# Patient Record
Sex: FEMALE | Race: WHITE | NOT HISPANIC OR LATINO | Employment: OTHER | URBAN - METROPOLITAN AREA
[De-identification: names, ages, dates, MRNs, and addresses within clinical notes are randomized per-mention and may not be internally consistent; named-entity substitution may affect disease eponyms.]

---

## 2017-01-09 ENCOUNTER — OFFICE VISIT (OUTPATIENT)
Dept: OPHTHALMOLOGY | Facility: CLINIC | Age: 82
End: 2017-01-09
Payer: MEDICARE

## 2017-01-09 DIAGNOSIS — H52.7 REFRACTIVE ERROR: ICD-10-CM

## 2017-01-09 DIAGNOSIS — H25.13 NUCLEAR SCLEROSIS OF BOTH EYES: Primary | ICD-10-CM

## 2017-01-09 DIAGNOSIS — Z46.0 CONTACT LENS/GLASSES FITTING: Primary | ICD-10-CM

## 2017-01-09 DIAGNOSIS — D31.31 CHOROIDAL NEVUS OF RIGHT EYE: ICD-10-CM

## 2017-01-09 PROCEDURE — 92310 CONTACT LENS FITTING OU: CPT | Mod: ,,, | Performed by: OPTOMETRIST

## 2017-01-09 PROCEDURE — 92014 COMPRE OPH EXAM EST PT 1/>: CPT | Mod: S$GLB,,, | Performed by: OPHTHALMOLOGY

## 2017-01-09 PROCEDURE — 92015 DETERMINE REFRACTIVE STATE: CPT | Mod: S$GLB,,, | Performed by: OPHTHALMOLOGY

## 2017-01-09 PROCEDURE — 99499 UNLISTED E&M SERVICE: CPT | Mod: S$GLB,,, | Performed by: OPTOMETRIST

## 2017-01-09 PROCEDURE — 99999 PR PBB SHADOW E&M-EST. PATIENT-LVL I: CPT | Mod: PBBFAC,,, | Performed by: OPHTHALMOLOGY
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
## 2017-01-09 NOTE — MR AVS SNAPSHOT
[facility]  [address] [place] [address]  Phone: [phone]  Fax: [phone]                  Isamar Roe   2017 2:30 PM   Office Visit    Description:  Female : 1935   Provider:  CASEY Giang OD   Department:  [facility]           Reason for Visit     Eye Exam           Diagnoses this Visit        Comments    Contact lens/glasses fitting    -  Primary            To Do List           Future Appointments        Provider Department Dept Phone    2017 8:00 AM LABORATORY, [address] [facility] [phone]    2017 10:40 AM Brian Shin MD [facility] [phone]    2018 10:30 AM Jaxon Rajan MD [facility] [phone]      Goals (5 Years of Data)     None      Follow-Up and Disposition     Return if symptoms worsen or fail to improve.      [facility]     [facility] Care Line -  Assistance  Registered nurses in the [facility] provide clinical advisement, health education, appointment booking, and other advisory services.  Call for this free service at [phone].             Medications           Message regarding Medications     Verify the changes and/or additions to your medication regime listed below are the same as discussed with your clinician today.  If any of these changes or additions are incorrect, please notify your healthcare provider.             Verify that the below list of medications is an accurate representation of the medications you are currently taking.  If none reported, the list may be blank. If incorrect, please contact your healthcare provider. Carry this list with you in case of emergency.           Current Medications     ascorbic acid (VITAMIN C) 500 MG tablet Take 500 mg by mouth once daily.    CALCIUM CITRATE/VITAMIN D2 (CALCIUM CITRATE WITH D ORAL) Take 1 tablet by mouth 2 (two) times daily.    estradiol (ESTRACE) 0.5 MG  tablet TAKE 1 TABLET ONE TIME DAILY    fish oil-fat acid comb.8-hb137 (OMEGA 3-6-9) 1,200 mg Cap Take 1 capsule by mouth once daily.     losartan (COZAAR) 50 MG tablet TAKE 1 TABLET EVERY DAY    niacin, inositol niacinate, (NIACIN FLUSH FREE) 400 mg Cap Take 1 capsule by mouth Every PM.    ondansetron (ZOFRAN) 4 MG tablet Take 1 tablet (4 mg total) by mouth every 8 (eight) hours as needed for Nausea.    verapamil (VERELAN) 120 MG C24P TAKE 1 CAPSULE ONE TIME DAILY    vitamin E 400 UNIT capsule Take 400 Units by mouth once daily.           Clinical Reference Information           Allergies as of 1/9/2017     Ace Inhibitors    Chlorthalidone    Codeine    Demerol (Pf)  [Meperidine (Pf)]    Fosamax  [Alendronate]    Fosamax Plus D  [Alendronate-vitamin D3]    Hydrochlorothiazide (Bulk)    Hydrocodone-acetaminophen    Ibandronate    Ibandronate Sodium    Meperidine    Toprol Xl  [Metoprolol Succinate]      Immunizations Administered on Date of Encounter - 1/9/2017     None
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
## 2017-03-20 ENCOUNTER — OFFICE VISIT (OUTPATIENT)
Dept: INTERNAL MEDICINE | Facility: CLINIC | Age: 82
End: 2017-03-20
Payer: MEDICARE

## 2017-03-20 VITALS
OXYGEN SATURATION: 97 % | DIASTOLIC BLOOD PRESSURE: 79 MMHG | BODY MASS INDEX: 25.87 KG/M2 | HEART RATE: 68 BPM | HEIGHT: 59 IN | WEIGHT: 128.31 LBS | SYSTOLIC BLOOD PRESSURE: 128 MMHG

## 2017-03-20 DIAGNOSIS — D31.31 CHOROIDAL NEVUS OF RIGHT EYE: ICD-10-CM

## 2017-03-20 DIAGNOSIS — M81.0 OSTEOPOROSIS: Chronic | ICD-10-CM

## 2017-03-20 DIAGNOSIS — I70.0 ARTERIOSCLEROSIS OF AORTA: ICD-10-CM

## 2017-03-20 DIAGNOSIS — Z85.828 HISTORY OF SKIN CANCER: ICD-10-CM

## 2017-03-20 DIAGNOSIS — K21.9 HIATAL HERNIA WITH GASTROESOPHAGEAL REFLUX: ICD-10-CM

## 2017-03-20 DIAGNOSIS — K44.9 HIATAL HERNIA WITH GASTROESOPHAGEAL REFLUX: ICD-10-CM

## 2017-03-20 DIAGNOSIS — I10 ESSENTIAL HYPERTENSION: Chronic | ICD-10-CM

## 2017-03-20 DIAGNOSIS — H25.13 NUCLEAR SCLEROSIS OF BOTH EYES: ICD-10-CM

## 2017-03-20 DIAGNOSIS — Z00.00 ENCOUNTER FOR PREVENTIVE HEALTH EXAMINATION: Primary | ICD-10-CM

## 2017-03-20 DIAGNOSIS — E78.5 HYPERLIPIDEMIA, UNSPECIFIED HYPERLIPIDEMIA TYPE: Chronic | ICD-10-CM

## 2017-03-20 DIAGNOSIS — M47.816 LUMBAR ARTHROPATHY: ICD-10-CM

## 2017-03-20 PROCEDURE — G0439 PPPS, SUBSEQ VISIT: HCPCS | Mod: S$GLB,,, | Performed by: PHYSICIAN ASSISTANT

## 2017-03-20 PROCEDURE — 3074F SYST BP LT 130 MM HG: CPT | Mod: S$GLB,,, | Performed by: PHYSICIAN ASSISTANT

## 2017-03-20 PROCEDURE — 3078F DIAST BP <80 MM HG: CPT | Mod: S$GLB,,, | Performed by: PHYSICIAN ASSISTANT

## 2017-03-20 PROCEDURE — 99499 UNLISTED E&M SERVICE: CPT | Mod: S$GLB,,, | Performed by: PHYSICIAN ASSISTANT

## 2017-03-20 PROCEDURE — 99999 PR PBB SHADOW E&M-EST. PATIENT-LVL III: CPT | Mod: PBBFAC,,, | Performed by: PHYSICIAN ASSISTANT
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
## 2017-03-20 NOTE — MR AVS SNAPSHOT
[facility]  [address] [place] [address]  Phone: [phone]  Fax: [phone]                  Isamar Roe   3/20/2017 12:30 PM   Office Visit    Description:  Female : 1935   Provider:  Velasquez Tello PA-C   Department:  [facility] - Internal Medicine           Reason for Visit     Health Risk Assessment           Diagnoses this Visit        Comments    Encounter for preventive health examination    -  Primary     Essential hypertension         Hyperlipidemia, unspecified hyperlipidemia type         Osteoporosis         Arteriosclerosis of aorta         Hiatal hernia with gastroesophageal reflux         Lumbar arthropathy         History of skin cancer         Nuclear sclerosis of both eyes                To Do List           Future Appointments        Provider Department Dept Phone    2017 8:00 AM LABORATORY, [address] [facility] [phone]    2017 10:40 AM Brian Shin MD [facility] Internal [facility] [phone]    2018 10:30 AM Jaxon Rajan MD [facility] Ophthalmology [phone]      Goals (5 Years of Data)     None      [facility]     [facility] Nurse Care [facility] -  Assistance  Registered nurses in the [facility] provide clinical advisement, health education, appointment booking, and other advisory services.  Call for this free service at [phone].             Medications           Message regarding Medications     Verify the changes and/or additions to your medication regime listed below are the same as discussed with your clinician today.  If any of these changes or additions are incorrect, please notify your healthcare provider.        STOP taking these medications     ascorbic acid (VITAMIN C) 500 MG tablet Take 500 mg by mouth once daily.    ondansetron (ZOFRAN) 4 MG tablet Take 1 tablet (4 mg total) by mouth every 8 (eight) hours as needed for Nausea.           Verify that the below list of  "medications is an accurate representation of the medications you are currently taking.  If none reported, the list may be blank. If incorrect, please contact your healthcare provider. Carry this list with you in case of emergency.           Current Medications     CALCIUM CITRATE/VITAMIN D2 (CALCIUM CITRATE WITH D ORAL) Take 1 tablet by mouth 2 (two) times daily.    estradiol (ESTRACE) 0.5 MG tablet TAKE 1 TABLET ONE TIME DAILY    fish oil-fat acid comb.8-hb137 (OMEGA 3-6-9) 1,200 mg Cap Take 1 capsule by mouth once daily.     losartan (COZAAR) 50 MG tablet TAKE 1 TABLET EVERY DAY    niacin, inositol niacinate, (NIACIN FLUSH FREE) 400 mg Cap Take 1 capsule by mouth Every PM.    verapamil (VERELAN) 120 MG C24P TAKE 1 CAPSULE ONE TIME DAILY    vitamin E 400 UNIT capsule Take 400 Units by mouth once daily.           Clinical Reference Information           Your Vitals Were     BP Pulse Height Weight SpO2 BMI    128/79 68 4' 10.5" (1.486 m) 58.2 kg (128 lb 4.9 oz) 97% 26.36 kg/m2      Blood Pressure          Most Recent Value    BP  128/79      Allergies as of 3/20/2017     Ace Inhibitors    Chlorthalidone    Codeine    Demerol (Pf)  [Meperidine (Pf)]    Fosamax  [Alendronate]    Fosamax Plus D  [Alendronate-vitamin D3]    Hydrochlorothiazide (Bulk)    Hydrocodone-acetaminophen    Ibandronate    Ibandronate Sodium    Meperidine    Toprol Xl  [Metoprolol Succinate]      Immunizations Administered on Date of Encounter - 3/20/2017     None      [facility] Sign-Up     Activating your [facility] account is as easy as 1-2-3!     1) Visit [email or web address], select Sign Up Now, enter this activation code and your date of birth, then select Next.  Activation code not generated  Current Patient Portal Status: Account disabled      2) Create a username and password to use when you visit [facility] in the future and select a security question in case you lose your password and select Next.    3) Enter your e-mail address and click Sign " Up!    Additional Information  If you have questions, please e-mail [email or web address] or call [phone] to talk to our [facility] staff. Remember, [facility] is NOT to be used for urgent needs. For medical emergencies, dial 911.         Instructions      Counseling and Referral of Other Preventative  (Italic type indicates deductible and co-insurance are waived)    Patient Name: Isamar Roe  Today's Date: 3/20/2017      SERVICE LIMITATIONS RECOMMENDATION    Vaccines    · Pneumococcal (once after 65)    · Influenza (annually)    · Hepatitis B (if medium/high risk)    · Prevnar 13      Hepatitis B medium/high risk factors:       - End-stage renal disease       - Hemophiliacs who received Factor VII or         IX concentrates       - Clients of institutions for the mentally             retarded       - Persons who live in the same house as          a HepB carrier       - Homosexual men       - Illicit injectable drug abusers     Pneumococcal:done 1/2015     Influenza: Recommended to patient, declined     Hepatitis B: N/A     Prevnar 13: don e8/2015    Mammogram (biennial age 50-74)  Annually (age 40 or over)  done 7/2015. Follow PCP    Pap (up to age 70 and after 70 if unknown history or abnormal study last 10 years)    N/A     The USPSTF recommends against screening for cervical cancer in women older than age 65 years who have had adequate prior screening and are not otherwise at high risk for cervical cancer.      Colorectal cancer screening (to age 75)    · Fecal occult blood test (annual)  · Flexible sigmoidoscopy (5y)  · Screening colonoscopy (10y)  · Barium enema   Last done 5/2012, recommend to repeat every 0  years    Diabetes self-management training (no USPSTF recommendations)  Requires referral by treating physician for patient with diabetes or renal disease. 10 hours of initial DSMT sessions of no less than 30 minutes each in a continuous 12-month period. 2 hours of follow-up DSMT in subsequent years.   Follow with PCP    Bone mass measurements (age 65 & older, biennial)  Requires diagnosis related to osteoporosis or estrogen deficiency. Biennial benefit unless patient has history of long-term glucocorticoid  Last done 9/27/2016, recommend to repeat every 2  years    Glaucoma screening (no USPSTF recommendation)  Diabetes mellitus, family history   , age 50 or over    American, age 65 or over Follow with Padmaja Castillo    Medical nutrition therapy for diabetes or renal disease (no recommended schedule)  Requires referral by treating physician for patient with diabetes or renal disease or kidney transplant within the past 3 years.  Can be provided in same year as diabetes self-management training (DSMT), and CMS recommends medical nutrition therapy take place after DSMT. Up to 3 hours for initial year and 2 hours in subsequent years.  Follow with PCP    Cardiovascular screening blood tests (every 5 years)  · Fasting lipid panel  Order as a panel if possible Follow with PCP    Diabetes screening tests (at least every 3 years, Medicare covers annually or at 6-month intervals for prediabetic patients)  · Fasting blood sugar (FBS) or glucose tolerance test (GTT)  Patient must be diagnosed with one of the following:       - Hypertension       - Dyslipidemia       - Obesity (BMI 30kg/m2)       - Previous elevated impaired FBS or GTT       ... or any two of the following:       - Overweight (BMI 25 but <30)       - Family history of diabetes       - Age 65 or older       - History of gestational diabetes or birth of baby weighing more than 9 pounds  Follow with PCP    Abdominal aortic aneurysm screening (once)  · Sonogram   Limited to patients who meet one of the following criteria:       - Men who are 65-75 years old and have smoked more than 100 cigarette in their lifetime       - Anyone with a family history of abdominal aortic aneurysm       - Anyone recommended for screening by the USPSTF   Ct abd 4/2012. Follow with PCP    HIV screening (annually for increased risk patients)  · HIV-1 and HIV-2 by EIA, or ROSANNE, rapid antibody test or oral mucosa transudate  Patients must be at increased risk for HIV infection per USPSTF guidelines or pregnant. Tests covered annually for patient at increased risk or as requested by the patient. Pregnant patients may receive up to 3 tests during pregnancy.  Risks discussed, screening is not recommended    Smoking cessation counseling (up to 8 sessions per year)  Patients must be asymptomatic of tobacco-related conditions to receive as a preventative service.  does not smoke    Subsequent annual wellness visit  At least 12 months since last AWV  Return in one year     The following information is provided to all patients.  This information is to help you find resources for any of the problems found today that may be affecting your health:                Living healthy guide: www.[facility].[place].[facility]      Understanding Diabetes: www.diabetes.org      Eating healthy: www.cdc.gov/healthyweight      [facility] home safety checklist: www.cdc.gov/steadi/patient.html      Agency on Aging: www.goea.[place].[facility]      Alcoholics anonymous (AA): www.aa.org      Physical Activity: www.juan.nih.gov/gd4niub      Tobacco use: www.quitwithusla.org          Language Assistance Services     ATTENTION: Language assistance services are available, free of charge. Please call [phone].      ATENCIÓN: Si pollyla marileewilson, tiene a benjamin disposición servicios gratuitos de asistencia lingüística. Llame al [phone].     [facility] Ý: N?u b?n nói Ti?ng Vi?t, có các d?ch v? h? tr? ngôn ng? mi?n phí dành cho b?n. G?i s? [phone].         [facility] complies with applicable Federal civil rights laws and does not discriminate on the basis of race, color, national origin, age, disability, or sex.

## 2017-03-20 NOTE — PATIENT INSTRUCTIONS
Counseling and Referral of Other Preventative  (Italic type indicates deductible and co-insurance are waived)    Patient Name: Isamar Roe  Today's Date: 3/20/2017      SERVICE LIMITATIONS RECOMMENDATION    Vaccines    · Pneumococcal (once after 65)    · Influenza (annually)    · Hepatitis B (if medium/high risk)    · Prevnar 13      Hepatitis B medium/high risk factors:       - End-stage renal disease       - Hemophiliacs who received Factor VII or         IX concentrates       - Clients of institutions for the mentally             retarded       - Persons who live in the same house as          a HepB carrier       - Homosexual men       - Illicit injectable drug abusers     Pneumococcal:done 1/2015     Influenza: Recommended to patient, declined     Hepatitis B: N/A     Prevnar 13: don e8/2015    Mammogram (biennial age 50-74)  Annually (age 40 or over)  done 7/2015. Follow PCP    Pap (up to age 70 and after 70 if unknown history or abnormal study last 10 years)    N/A     The USPSTF recommends against screening for cervical cancer in women older than age 65 years who have had adequate prior screening and are not otherwise at high risk for cervical cancer.      Colorectal cancer screening (to age 75)    · Fecal occult blood test (annual)  · Flexible sigmoidoscopy (5y)  · Screening colonoscopy (10y)  · Barium enema   Last done 5/2012, recommend to repeat every 0  years    Diabetes self-management training (no USPSTF recommendations)  Requires referral by treating physician for patient with diabetes or renal disease. 10 hours of initial DSMT sessions of no less than 30 minutes each in a continuous 12-month period. 2 hours of follow-up DSMT in subsequent years.  Follow with PCP    Bone mass measurements (age 65 & older, biennial)  Requires diagnosis related to osteoporosis or estrogen deficiency. Biennial benefit unless patient has history of long-term glucocorticoid  Last done 9/27/2016, recommend to repeat every  2  years    Glaucoma screening (no USPSTF recommendation)  Diabetes mellitus, family history   , age 50 or over    American, age 65 or over Follow with Padmaja Casitllo    Medical nutrition therapy for diabetes or renal disease (no recommended schedule)  Requires referral by treating physician for patient with diabetes or renal disease or kidney transplant within the past 3 years.  Can be provided in same year as diabetes self-management training (DSMT), and CMS recommends medical nutrition therapy take place after DSMT. Up to 3 hours for initial year and 2 hours in subsequent years.  Follow with PCP    Cardiovascular screening blood tests (every 5 years)  · Fasting lipid panel  Order as a panel if possible Follow with PCP    Diabetes screening tests (at least every 3 years, Medicare covers annually or at 6-month intervals for prediabetic patients)  · Fasting blood sugar (FBS) or glucose tolerance test (GTT)  Patient must be diagnosed with one of the following:       - Hypertension       - Dyslipidemia       - Obesity (BMI 30kg/m2)       - Previous elevated impaired FBS or GTT       ... or any two of the following:       - Overweight (BMI 25 but <30)       - Family history of diabetes       - Age 65 or older       - History of gestational diabetes or birth of baby weighing more than 9 pounds  Follow with PCP    Abdominal aortic aneurysm screening (once)  · Sonogram   Limited to patients who meet one of the following criteria:       - Men who are 65-75 years old and have smoked more than 100 cigarette in their lifetime       - Anyone with a family history of abdominal aortic aneurysm       - Anyone recommended for screening by the USPSTF  Ct abd 4/2012. Follow with PCP    HIV screening (annually for increased risk patients)  · HIV-1 and HIV-2 by EIA, or ROSANNE, rapid antibody test or oral mucosa transudate  Patients must be at increased risk for HIV infection per USPSTF guidelines or  pregnant. Tests covered annually for patient at increased risk or as requested by the patient. Pregnant patients may receive up to 3 tests during pregnancy.  Risks discussed, screening is not recommended    Smoking cessation counseling (up to 8 sessions per year)  Patients must be asymptomatic of tobacco-related conditions to receive as a preventative service.  does not smoke    Subsequent annual wellness visit  At least 12 months since last AWV  Return in one year     The following information is provided to all patients.  This information is to help you find resources for any of the problems found today that may be affecting your health:                Living healthy guide: www.Counts include 234 beds at the [email or web address]      Understanding Diabetes: www.diabetes.org      Eating healthy: www.cdc.gov/healthyweight      CDC home safety checklist: www.cdc.gov/steadi/patient.html      Agency on Aging: www.goea.[place].[facility]      Alcoholics anonymous (AA): www.aa.org      Physical Activity: www.juan.nih.gov/ls5tcly      Tobacco use: www.quitwithusla.org

## 2017-06-15 RX ORDER — LOSARTAN POTASSIUM 50 MG/1
TABLET ORAL
Qty: 90 TABLET | Refills: 3 | Status: SHIPPED | OUTPATIENT
Start: 2017-06-15 | End: 2018-08-31 | Stop reason: SDUPTHER

## 2017-07-05 DIAGNOSIS — N95.9 MENOPAUSAL AND POSTMENOPAUSAL DISORDER: ICD-10-CM

## 2017-07-06 RX ORDER — ESTRADIOL 0.5 MG/1
TABLET ORAL
Qty: 90 TABLET | Refills: 3 | Status: SHIPPED | OUTPATIENT
Start: 2017-07-06 | End: 2018-11-15 | Stop reason: SDUPTHER

## 2017-08-23 ENCOUNTER — TELEPHONE (OUTPATIENT)
Dept: INTERNAL MEDICINE | Facility: CLINIC | Age: 82
End: 2017-08-23

## 2017-08-28 ENCOUNTER — LAB VISIT (OUTPATIENT)
Dept: LAB | Facility: HOSPITAL | Age: 82
End: 2017-08-28
Payer: MEDICARE

## 2017-08-28 DIAGNOSIS — I10 ESSENTIAL HYPERTENSION: Chronic | ICD-10-CM

## 2017-08-28 LAB
ANION GAP SERPL CALC-SCNC: 5 MMOL/L
BUN SERPL-MCNC: 15 MG/DL
CALCIUM SERPL-MCNC: 9.3 MG/DL
CHLORIDE SERPL-SCNC: 108 MMOL/L
CHOLEST/HDLC SERPL: 3.6 {RATIO}
CO2 SERPL-SCNC: 29 MMOL/L
CREAT SERPL-MCNC: 0.7 MG/DL
EST. GFR  (AFRICAN AMERICAN): >60 ML/MIN/1.73 M^2
EST. GFR  (NON AFRICAN AMERICAN): >60 ML/MIN/1.73 M^2
GLUCOSE SERPL-MCNC: 79 MG/DL
HDL/CHOLESTEROL RATIO: 27.5 %
HDLC SERPL-MCNC: 233 MG/DL
HDLC SERPL-MCNC: 64 MG/DL
LDLC SERPL CALC-MCNC: 135.8 MG/DL
NONHDLC SERPL-MCNC: 169 MG/DL
POTASSIUM SERPL-SCNC: 4.4 MMOL/L
SODIUM SERPL-SCNC: 142 MMOL/L
TRIGL SERPL-MCNC: 166 MG/DL

## 2017-08-28 PROCEDURE — 80048 BASIC METABOLIC PNL TOTAL CA: CPT

## 2017-08-28 PROCEDURE — 80061 LIPID PANEL: CPT

## 2017-08-28 PROCEDURE — 36415 COLL VENOUS BLD VENIPUNCTURE: CPT

## 2017-09-06 ENCOUNTER — OFFICE VISIT (OUTPATIENT)
Dept: INTERNAL MEDICINE | Facility: CLINIC | Age: 82
End: 2017-09-06
Payer: MEDICARE

## 2017-09-06 VITALS
DIASTOLIC BLOOD PRESSURE: 68 MMHG | HEART RATE: 69 BPM | HEIGHT: 59 IN | SYSTOLIC BLOOD PRESSURE: 138 MMHG | WEIGHT: 131.19 LBS | OXYGEN SATURATION: 98 % | BODY MASS INDEX: 26.45 KG/M2 | TEMPERATURE: 98 F

## 2017-09-06 DIAGNOSIS — E78.5 HYPERLIPIDEMIA, UNSPECIFIED HYPERLIPIDEMIA TYPE: Chronic | ICD-10-CM

## 2017-09-06 DIAGNOSIS — M81.0 OSTEOPOROSIS, UNSPECIFIED OSTEOPOROSIS TYPE, UNSPECIFIED PATHOLOGICAL FRACTURE PRESENCE: Chronic | ICD-10-CM

## 2017-09-06 DIAGNOSIS — Z00.00 ROUTINE HEALTH MAINTENANCE: Primary | ICD-10-CM

## 2017-09-06 DIAGNOSIS — T45.8X5A: ICD-10-CM

## 2017-09-06 DIAGNOSIS — I10 ESSENTIAL HYPERTENSION: Chronic | ICD-10-CM

## 2017-09-06 PROCEDURE — 99499 UNLISTED E&M SERVICE: CPT | Mod: S$GLB,,, | Performed by: FAMILY MEDICINE

## 2017-09-06 PROCEDURE — 99397 PER PM REEVAL EST PAT 65+ YR: CPT | Mod: S$GLB,,, | Performed by: FAMILY MEDICINE

## 2017-09-06 PROCEDURE — 99999 PR PBB SHADOW E&M-EST. PATIENT-LVL III: CPT | Mod: PBBFAC,,, | Performed by: FAMILY MEDICINE

## 2017-09-06 RX ORDER — GUAIFENESIN AND PHENYLEPHRINE HCL 400; 10 MG/1; MG/1
2 TABLET ORAL DAILY
COMMUNITY
End: 2018-09-06

## 2017-09-06 RX ORDER — ASCORBIC ACID 500 MG
500 TABLET ORAL DAILY
COMMUNITY
End: 2018-09-06

## 2017-09-06 RX ORDER — ESTRADIOL 0.5 MG/1
TABLET ORAL
COMMUNITY
Start: 2016-09-06 | End: 2017-09-06 | Stop reason: SDUPTHER

## 2017-09-06 RX ORDER — HYDROCORTISONE 25 MG/G
CREAM TOPICAL
COMMUNITY
Start: 2017-05-11 | End: 2019-05-13

## 2017-09-06 RX ORDER — VERAPAMIL HYDROCHLORIDE 120 MG/1
CAPSULE, EXTENDED RELEASE ORAL
COMMUNITY
Start: 2016-09-12 | End: 2017-09-06 | Stop reason: SDUPTHER

## 2017-09-06 RX ORDER — PANTOPRAZOLE SODIUM 40 MG/1
1 TABLET, DELAYED RELEASE ORAL DAILY
COMMUNITY
Start: 2015-12-10 | End: 2017-09-06

## 2017-09-06 RX ORDER — ONDANSETRON 4 MG/1
4 TABLET, FILM COATED ORAL
COMMUNITY
Start: 2014-12-11 | End: 2019-05-13

## 2017-09-06 RX ORDER — LOSARTAN POTASSIUM 50 MG/1
TABLET ORAL
COMMUNITY
Start: 2016-09-12 | End: 2017-09-06 | Stop reason: SDUPTHER

## 2017-09-06 RX ORDER — LEVOCARNITINE 500 MG
1 TABLET ORAL DAILY
COMMUNITY
Start: 2012-05-04 | End: 2019-05-13

## 2017-09-06 RX ORDER — VITAMIN E 268 MG
400 CAPSULE ORAL
COMMUNITY
End: 2017-09-06 | Stop reason: SDUPTHER

## 2017-09-06 RX ORDER — FLUTICASONE PROPIONATE 50 MCG
2 SPRAY, SUSPENSION (ML) NASAL DAILY
Qty: 16 G | Refills: 1 | Status: SHIPPED | OUTPATIENT
Start: 2017-09-06 | End: 2018-03-26

## 2017-09-06 NOTE — PATIENT INSTRUCTIONS
Low-Cholesterol Diet  Your body needs cholesterol to build new cells and create certain hormones. There are 2 kinds of cholesterol in your blood:    · HDL (good) cholesterol. This prevents fat deposits (plaque) from building up in your arteries. In this way it protects against heart disease and stroke.  · LDL (bad) cholesterol. This stays in your body and sticks to artery walls. Over time it may block blood flow to the heart and brain. This can cause a heart attack or stroke.  The cholesterol in your blood comes from 2 sources: cholesterol in food that you eat and cholesterol that your liver makes. You should limit the amount of cholesterol in your diet. But the cholesterol that your body makes has the greatest disease risk. And your body makes more cholesterol when your diet is high in bad fats (saturated and trans fats). There are 2 kinds of fats you can eat:  · Good fats, or unsaturated fats (mono-unsaturated and poly-unsaturated). They raise the level of good cholesterol and lower the level of bad cholesterol. Good fats are found in vegetable oils such as olive, sunflower, corn, and soybean oils, and in nuts and seeds.  · Bad fats, or saturated fats (including foods high in cholesterol) and trans fats. These raise your risk of disease. They lower the good cholesterol and raise the level of bad cholesterol. Bad fats are found in animal products, including meat, whole-milk dairy products, and butter. Some plants are also high in bad fats (coconut and palm plants). Trans fats are found in hard (stick) margarines. They are also in many fast foods and commercially baked goods. Soft margarine sold in tubs has fewer trans fats and is safer to use.  High blood cholesterol is usually due to a diet high in saturated fat, along with not being physically active. In some cases, genetics plays a role in causing high cholesterol. The tips below will help you create healthy eating habits that will help lower your blood  cholesterol level.  Create a diet high in good fats, low in bad fats (and low in cholesterol)  The following steps will help you create a diet high in good fats and low in bad fats:  · Talk with your doctor before starting a low cholesterol diet or weight loss program.  · Learn to read nutrition labels and select appropriate portion sizes.  · When cooking, use plant-based unsaturated vegetable oils (sunflower, corn, soybean, canola, peanut, and olive oils).  · Avoid saturated fats found in animal products such as meat, dairy (whole-milk, cheese and ice cream), poultry skin, and egg yolks. Plants high in saturated oils include coconut oil, palm oil, and palm kernel oil.  · If you eat meat, choose smaller portions and lean cuts, such as round, rosette, sirloin, or loin. Eat more meatless meals.  · Replace meat with fish at least 2 times a week. Fish is an important source of the unsaturated fat called omega-3 fatty acids. This fat has potential to lower the risk of heart disease.  · Replace whole-milk dairy products with low-fat or nonfat products. Try soy products. Soy helps to reduce total cholesterol.  · Supplement your diet with protective fibers. Eat nuts, seeds, and whole grains rather than white rice and bread. These foods lower both cholesterol and triglyceride levels. (Triglycerides are another fat found in the blood.) Walnuts are one of the best sources of omega-3 fatty acids.  · Eat plenty of fresh fruits and vegetables daily.  · Avoid fast foods and commercial baked goods. Assume they contain saturated fat unless labeled otherwise.  Date Last Reviewed: 8/1/2016  © 1529-1832 [employer]. [address] [place] [address], [place] [address]. All rights reserved. This information is not intended as a substitute for professional medical care. Always follow your healthcare professional's instructions.

## 2017-09-06 NOTE — PROGRESS NOTES
Subjective:       Patient ID: Isamar Roe is a 82 y.o. female.    Chief Complaint: here for physical examination and issues  below    HPI Hypertension: blood pressures at home normal. Tolerating medicine.   hyperchol on niacin d/wd lowering chol\ consider asa;recent poor diet. Intol statins  osteopor intol bisphos  Past Medical History:   Diagnosis Date    Arthritis     hands    Benign hematuria     [place]    Calcific tendinitis of left shoulder 1/27/2016    Cataract     Diverticulosis     colonoscopy 5/22/2012    Duodenal diverticulum     EGD 5/22/2012    GERD (gastroesophageal reflux disease)     Hemorrhoids     colonoscopy 5/22/2012    History of skin cancer     per patient s/p excision x2 face, no chemo or radiation, sees outside derm, Dr. Tinsley.    Hyperlipidemia     Hypertension     Impingement syndrome of left shoulder 1/27/2016    Osteoporosis     declines tx;11/17/16 phone note    Pneumonia     as a child    Sciatica     [place]; dr castillo    Skin cancer     Dr. Noriega    Vitamin D deficiency disease      Past Surgical History:   Procedure Laterality Date    APPENDECTOMY      CARPAL TUNNEL RELEASE Bilateral     CHOLECYSTECTOMY      HYSTERECTOMY      joint removal in right thumb      SKIN CANCER EXCISION      x2    SPINE SURGERY  9/16/14    L4/5 laminectomy;dr sagastume    TOE SURGERY      [facility]     Family History   Problem Relation Age of Onset    Heart disease Mother     Hypertension Mother     Cataracts Mother     Heart disease Father     Hypertension Father     Cataracts Father     Heart disease Brother 45    Heart disease Sister      A-fib    Heart disease Sister      heart bypass x 5    Kidney disease Brother 70    Heart disease Brother     Cancer Maternal Aunt      breast    Diabetes Maternal Grandmother     Diabetes Maternal Aunt     Hypertension Other      multiple family members    Stroke Neg Hx     Melanoma Neg Hx      Social History     Social History     Marital status:      Spouse name: AMBREEN    Number of children: 4    Years of education: N/A     Social History Main Topics    Smoking status: Never Smoker    Smokeless tobacco: Never Used    Alcohol use No    Drug use: No    Sexual activity: Yes     Partners: Male     Other Topics Concern    Are You Pregnant Or Think You May Be? No    Breast-Feeding No     Social History Narrative    None       Review of Systems  Cardiovascular: no chest pain  Chest: no shortness of breath  Abd: no abd pain  Remainder review of systems negative    Objective:      Physical Exam   Constitutional: She is oriented to person, place, and time. She appears well-developed and well-nourished. No distress.   HENT:   Head: Atraumatic.   Right Ear: External ear normal.   Left Ear: External ear normal.   Nose: Nose normal.   Mouth/Throat: Oropharynx is clear and moist. No oropharyngeal exudate.   bilat tms nl   Eyes: Conjunctivae and EOM are normal. Pupils are equal, round, and reactive to light. No scleral icterus.   Neck: Normal range of motion. Neck supple. No thyromegaly present.   Cardiovascular: Normal rate, regular rhythm and normal heart sounds.    No murmur heard.  Pulmonary/Chest: Effort normal and breath sounds normal. No respiratory distress. She has no wheezes. She has no rales.   Abdominal: Soft. Bowel sounds are normal. She exhibits no distension and no mass. There is no hepatosplenomegaly. There is no tenderness. There is no rebound and no guarding.   Musculoskeletal: Normal range of motion. She exhibits no edema or tenderness.   Lymphadenopathy:     She has no cervical adenopathy.   Neurological: She is alert and oriented to person, place, and time. No cranial nerve deficit. She exhibits normal muscle tone. Coordination normal.   Skin: Skin is warm. No rash noted. No erythema. No pallor.   Psychiatric: She has a normal mood and affect. Her behavior is normal. Judgment and thought content normal.   Nursing note  and vitals reviewed.      Assessment:       1. Routine health maintenance    2. Essential hypertension    3. Osteoporosis, unspecified osteoporosis type, unspecified pathological fracture presence    4. Hyperlipidemia, unspecified hyperlipidemia type        Plan:       **Aspirin 81 mg for cardiovascular and stroke prevention discussed side effects discussed  lowchol diet handout  Lab 12 months and follow up after  Routine health maintenance    Essential hypertension  -     Comprehensive metabolic panel; Future; Expected date: 09/06/2018    Osteoporosis, unspecified osteoporosis type, unspecified pathological fracture presence  -     Ambulatory referral to Endocrinology    Hyperlipidemia, unspecified hyperlipidemia type  -     Lipid panel; Future; Expected date: 09/06/2018    Adverse effect of biphosphonate  -     Ambulatory referral to Endocrinology    Other orders  -     fluticasone (FLONASE) 50 mcg/actuation nasal spray; 2 sprays by Each Nare route once daily.  Dispense: 16 g; Refill: 1      *

## 2017-09-07 RX ORDER — VERAPAMIL HYDROCHLORIDE 120 MG/1
CAPSULE, EXTENDED RELEASE ORAL
Qty: 90 CAPSULE | Refills: 3 | Status: SHIPPED | OUTPATIENT
Start: 2017-09-07 | End: 2018-10-23 | Stop reason: SDUPTHER

## 2017-10-18 ENCOUNTER — OFFICE VISIT (OUTPATIENT)
Dept: ENDOCRINOLOGY | Facility: CLINIC | Age: 82
End: 2017-10-18
Payer: MEDICARE

## 2017-10-18 ENCOUNTER — LAB VISIT (OUTPATIENT)
Dept: LAB | Facility: HOSPITAL | Age: 82
End: 2017-10-18
Attending: INTERNAL MEDICINE
Payer: MEDICARE

## 2017-10-18 VITALS
WEIGHT: 132.69 LBS | BODY MASS INDEX: 26.75 KG/M2 | TEMPERATURE: 97 F | DIASTOLIC BLOOD PRESSURE: 76 MMHG | HEART RATE: 67 BPM | SYSTOLIC BLOOD PRESSURE: 130 MMHG | HEIGHT: 59 IN

## 2017-10-18 DIAGNOSIS — M81.0 OSTEOPOROSIS, UNSPECIFIED OSTEOPOROSIS TYPE, UNSPECIFIED PATHOLOGICAL FRACTURE PRESENCE: Primary | Chronic | ICD-10-CM

## 2017-10-18 DIAGNOSIS — M81.0 OSTEOPOROSIS, UNSPECIFIED OSTEOPOROSIS TYPE, UNSPECIFIED PATHOLOGICAL FRACTURE PRESENCE: Chronic | ICD-10-CM

## 2017-10-18 LAB
25(OH)D3+25(OH)D2 SERPL-MCNC: 37 NG/ML
ALBUMIN SERPL BCP-MCNC: 3.4 G/DL
ALP SERPL-CCNC: 86 U/L
CALCIUM SERPL-MCNC: 9.7 MG/DL
PHOSPHATE SERPL-MCNC: 3 MG/DL
PTH-INTACT SERPL-MCNC: 42 PG/ML

## 2017-10-18 PROCEDURE — 82040 ASSAY OF SERUM ALBUMIN: CPT

## 2017-10-18 PROCEDURE — 99204 OFFICE O/P NEW MOD 45 MIN: CPT | Mod: S$GLB,,, | Performed by: INTERNAL MEDICINE

## 2017-10-18 PROCEDURE — 82306 VITAMIN D 25 HYDROXY: CPT

## 2017-10-18 PROCEDURE — 36415 COLL VENOUS BLD VENIPUNCTURE: CPT | Mod: PO

## 2017-10-18 PROCEDURE — 82310 ASSAY OF CALCIUM: CPT

## 2017-10-18 PROCEDURE — 84100 ASSAY OF PHOSPHORUS: CPT

## 2017-10-18 PROCEDURE — 99499 UNLISTED E&M SERVICE: CPT | Mod: S$GLB,,, | Performed by: INTERNAL MEDICINE

## 2017-10-18 PROCEDURE — 84075 ASSAY ALKALINE PHOSPHATASE: CPT

## 2017-10-18 PROCEDURE — 99999 PR PBB SHADOW E&M-EST. PATIENT-LVL III: CPT | Mod: PBBFAC,,, | Performed by: INTERNAL MEDICINE

## 2017-10-18 PROCEDURE — 83970 ASSAY OF PARATHORMONE: CPT

## 2017-10-18 NOTE — LETTER
October 18, 2017      Brian Shin MD  [address]  [place] [address]           [facility] - Endocrinology  [address]  [address]  [place] [address]  Phone: [phone]  Fax: [phone]          Patient: Isamar Roe   MR Number: [id number]   YOB: 1935   Date of Visit: 10/18/2017       Dear Dr. Brian Shin:    Thank you for referring Isamar Roe to me for evaluation. Attached you will find relevant portions of my assessment and plan of care.    If you have questions, please do not hesitate to call me. I look forward to following Isamar Roe along with you.    Sincerely,    Leticia Maya MD    Enclosure  CC:  No Recipients    If you would like to receive this communication electronically, please contact [email or web address] or [phone] to request more information on [employer] access.    For providers and/or their staff who would like to refer a patient to [facility], please contact us through our one-stop-shop provider referral line, [facility], at [phone].    If you feel you have received this communication in error or would no longer like to receive these types of communications, please e-mail [email or web address]

## 2017-10-18 NOTE — PROGRESS NOTES
"Subjective:       Isamar Roe is a 82 y.o. female who I am asked to see in consultation for evaluation of osteoporosis. She was diagnosed with osteoporosis by a bone density scan in 2014- may have had osteopenia prior- last BMD 10/2016 showed worseing in L-spine(t-2.6). Patient denies history of fracture. The cause of osteoporosis is felt to be due to postmenopausal estrogen deficiency. She is currently being treated with calcium and vitamin D supplementation. She is not currently being treated with bisphosphonates.  When she tried Fosamax and Boniva she had aching all over and joint pain      Osteoporosis Risk Factors   Nonmodifiable  Personal Hx of fracture as an adult: no  Hx of fracture in first-degree relative: no   race: yes  Advanced age: yes  Female sex: yes  Dementia: no  Poor health/frailty: no     Potentially modifiable:  Tobacco use: no  Low body weight (<127 lbs): no  Estrogen deficiency     early menopause (age <45) or bilateral ovariectomy: no     prolonged premenopausal amenorrhea (>1 yr): no  Low calcium intake (lifelong): no  Alcoholism: no  Recurrent falls: no  Inadequate physical activity: no    Has GERD gaviscon prn    Current calcium and Vit D intake: 2  calcium tabs 600mg tabs with D supper   Dietary sources: 2 to 3 cups of milk  Supplements: n/a    The following portions of the patient's history were reviewed and updated as appropriate: allergies, current medications, past family history, past medical history, past social history, past surgical history and problem list.    Review of Systems  A comprehensive review of systems was negative except for: Constitutional: positive for tiredness occ       Objective:      /76   Pulse 67   Temp 96.6 °F (35.9 °C) (Tympanic)   Ht 4' 10.5" (1.486 m)   Wt 60.2 kg (132 lb 11.5 oz)   BMI 27.27 kg/m²   General:  alert, appears stated age and cooperative   Body Habitus thin   Oropharynx: lips, mucosa, and tongue normal; teeth and gums " normal    Eyes:  negative findings: lids and lashes normal, conjunctivae and sclerae normal, corneas clear and pupils equal, round, reactive to light and accomodation    Ears:  normal TM's and external ear canals both ears   Neck: no adenopathy, no carotid bruit, no JVD, supple, symmetrical, trachea midline and thyroid not enlarged, symmetric, no tenderness/mass/nodules   Thyroid:  no palpable nodule   Lung: clear to auscultation bilaterally   Heart:  regular rate and rhythm, S1, S2 normal, no murmur, click, rub or gallop   Abdomen: soft, non-tender; bowel sounds normal; no masses,  no organomegaly   Extremities: extremities normal, atraumatic, no cyanosis or edema   Skin: warm and dry, no hyperpigmentation, vitiligo, or suspicious lesions   Pulses: 2+ and symmetric   Neuro: normal without focal findings, mental status, speech normal, alert and oriented x3, NAY, reflexes normal and symmetric, sensation grossly normal and gait and station normal     Imaging  Bone Density: Spine T Score: -2.6, Hip T Score: -1.3      Assessment:      Osteoporosis, with worsening in L-spine on current therapy of only calcium and vitamin D .    GERD  Plan:          1.  When she was on Bisphosphonate therapy she had significant body aches, also has history of GERD therefore will plan to treat would Prolia  Contraindications reviewed: no  Will obtain blood work in 24 hour urine to help evaluate for other causes of osteoporosis as well as assess calcium and vitamin D status  2. Calcium and vitamin D supplementation.  Patient advised that if she is taking calcium carbonate that it is best absorbed with a meal and then  doses as opposed to taking the 2 tablets together and while on Prolia it will  be important to prevent hypocalcemia to continue to be compliant with taking calcium daily     3. The risks and benefits of my recommendations, as well as other treatment options were discussed with the patient and  today.  Questions were answered.  4. Follow up: 6 months and as needed.  5. Approximately 30 minutes of this 40 minute appointment was spent counseling,  explaining osteoporosis and its etiology/treatment, and answering questions.

## 2017-10-18 NOTE — PATIENT INSTRUCTIONS
Cacium carbonate take with food and separate tablets to twice a day    (calcium citrate absorbs better-ok to take together)

## 2017-10-30 ENCOUNTER — LAB VISIT (OUTPATIENT)
Dept: LAB | Facility: HOSPITAL | Age: 82
End: 2017-10-30
Attending: INTERNAL MEDICINE
Payer: MEDICARE

## 2017-10-30 DIAGNOSIS — M81.0 OSTEOPOROSIS, UNSPECIFIED OSTEOPOROSIS TYPE, UNSPECIFIED PATHOLOGICAL FRACTURE PRESENCE: Chronic | ICD-10-CM

## 2017-10-30 LAB
CALCIUM 24H UR-MRATE: 9 MG/HR
CALCIUM UR-MCNC: 15.7 MG/DL
CALCIUM URINE (MG/SPEC): 212 MG/SPEC
CREAT 24H UR-MRATE: 25.3 MG/HR
CREAT UR-MCNC: 45 MG/DL
CREATININE, URINE (MG/SPEC): 607.5 MG/SPEC
SODIUM 24H UR-SRATE: 4.4 MMOL/HR
SODIUM UR-SCNC: 78 MMOL/L
SODIUM URINE (MMOL/SPEC): 105 MMOL/SPEC
URINE COLLECTION DURATION: 24 HR
URINE VOLUME: 1350 ML

## 2017-10-30 PROCEDURE — 84300 ASSAY OF URINE SODIUM: CPT

## 2017-10-30 PROCEDURE — 82340 ASSAY OF CALCIUM IN URINE: CPT

## 2017-10-30 PROCEDURE — 82570 ASSAY OF URINE CREATININE: CPT

## 2017-11-06 DIAGNOSIS — M81.0 OSTEOPOROSIS, UNSPECIFIED OSTEOPOROSIS TYPE, UNSPECIFIED PATHOLOGICAL FRACTURE PRESENCE: Primary | ICD-10-CM

## 2017-11-13 ENCOUNTER — OFFICE VISIT (OUTPATIENT)
Dept: INTERNAL MEDICINE | Facility: CLINIC | Age: 82
End: 2017-11-13
Payer: MEDICARE

## 2017-11-13 VITALS
TEMPERATURE: 98 F | DIASTOLIC BLOOD PRESSURE: 62 MMHG | BODY MASS INDEX: 27.81 KG/M2 | WEIGHT: 132.5 LBS | HEART RATE: 81 BPM | HEIGHT: 58 IN | SYSTOLIC BLOOD PRESSURE: 136 MMHG

## 2017-11-13 DIAGNOSIS — R05.9 COUGH: ICD-10-CM

## 2017-11-13 DIAGNOSIS — J32.0 MAXILLARY SINUSITIS, UNSPECIFIED CHRONICITY: Primary | ICD-10-CM

## 2017-11-13 PROCEDURE — 99999 PR PBB SHADOW E&M-EST. PATIENT-LVL III: CPT | Mod: PBBFAC,,, | Performed by: FAMILY MEDICINE

## 2017-11-13 PROCEDURE — 99214 OFFICE O/P EST MOD 30 MIN: CPT | Mod: S$GLB,,, | Performed by: FAMILY MEDICINE

## 2017-11-13 RX ORDER — METHYLPREDNISOLONE 4 MG/1
TABLET ORAL
Qty: 1 PACKAGE | Refills: 0 | Status: SHIPPED | OUTPATIENT
Start: 2017-11-13 | End: 2017-12-04

## 2017-11-13 RX ORDER — PROMETHAZINE HYDROCHLORIDE AND DEXTROMETHORPHAN HYDROBROMIDE 6.25; 15 MG/5ML; MG/5ML
5 SYRUP ORAL NIGHTLY PRN
Qty: 50 ML | Refills: 0 | Status: SHIPPED | OUTPATIENT
Start: 2017-11-13 | End: 2017-11-23

## 2017-11-13 NOTE — PROGRESS NOTES
Subjective:       Patient ID: Isamar Roe is a 82 y.o. female.    Chief Complaint: Sore Throat and Cough    F/U:      Pt is reporting a cough x 3 day worse when lay down. Pt denied any sinus pressure or post nasal drip. No fever at this point.       Review of Systems   Constitutional: Negative.    HENT: Negative for sinus pain, sinus pressure, sneezing and sore throat.    Respiratory: Positive for cough.    Cardiovascular: Negative.    Gastrointestinal: Negative.    Genitourinary: Negative.    Neurological: Negative.    Hematological: Negative.    Psychiatric/Behavioral: Negative.        Objective:      Physical Exam   Constitutional: She is oriented to person, place, and time. She appears well-developed and well-nourished.   HENT:   Right Ear: Tympanic membrane is erythematous. A middle ear effusion is present.   Left Ear: Tympanic membrane is erythematous.   Nose: Mucosal edema and rhinorrhea present. Right sinus exhibits maxillary sinus tenderness. Left sinus exhibits maxillary sinus tenderness.   Mouth/Throat: Posterior oropharyngeal erythema present.   Neck: Normal range of motion. Neck supple.   Cardiovascular: Normal rate and regular rhythm.    Pulmonary/Chest: Effort normal and breath sounds normal.   Abdominal: Soft. Bowel sounds are normal.   Neurological: She is alert and oriented to person, place, and time.   Skin: Skin is warm and dry.   Psychiatric: She has a normal mood and affect. Her behavior is normal.       Assessment:       1. Maxillary sinusitis, unspecified chronicity    2. Cough        Plan:       Maxillary sinusitis, unspecified chronicity  Comments:  Will start on medrol dose pack. OTC flonase and antihistamine    Cough  Comments:  Will start on phenergan dm and mucinex dm during the daytime    Other orders  -     methylPREDNISolone (MEDROL DOSEPACK) 4 mg tablet; use as directed  Dispense: 1 Package; Refill: 0  -     promethazine-dextromethorphan (PROMETHAZINE-DM) 6.25-15 mg/5 mL Syrp;  Take 5 mLs by mouth nightly as needed.  Dispense: 50 mL; Refill: 0

## 2017-11-13 NOTE — PATIENT INSTRUCTIONS
Zrytec or claritin: 1 tablet in morning    Mucinex DM - 1 tab in morning    Medrol dose- steroid pack.    Phenergan DM- 1 tsp at night.

## 2017-11-24 DIAGNOSIS — K21.9 HIATAL HERNIA WITH GASTROESOPHAGEAL REFLUX: ICD-10-CM

## 2017-11-24 DIAGNOSIS — M81.0 OSTEOPOROSIS, UNSPECIFIED OSTEOPOROSIS TYPE, UNSPECIFIED PATHOLOGICAL FRACTURE PRESENCE: Primary | Chronic | ICD-10-CM

## 2017-11-24 DIAGNOSIS — K44.9 HIATAL HERNIA WITH GASTROESOPHAGEAL REFLUX: ICD-10-CM

## 2017-11-27 ENCOUNTER — TELEPHONE (OUTPATIENT)
Dept: ENDOCRINOLOGY | Facility: CLINIC | Age: 82
End: 2017-11-27

## 2017-12-19 ENCOUNTER — TELEPHONE (OUTPATIENT)
Dept: ENDOCRINOLOGY | Facility: CLINIC | Age: 82
End: 2017-12-19

## 2018-02-07 ENCOUNTER — OFFICE VISIT (OUTPATIENT)
Dept: INTERNAL MEDICINE | Facility: CLINIC | Age: 83
End: 2018-02-07
Payer: MEDICARE

## 2018-02-07 VITALS
BODY MASS INDEX: 27.09 KG/M2 | HEART RATE: 73 BPM | HEIGHT: 58 IN | TEMPERATURE: 98 F | DIASTOLIC BLOOD PRESSURE: 68 MMHG | WEIGHT: 129.06 LBS | SYSTOLIC BLOOD PRESSURE: 160 MMHG | OXYGEN SATURATION: 98 %

## 2018-02-07 DIAGNOSIS — J01.90 ACUTE SINUSITIS, RECURRENCE NOT SPECIFIED, UNSPECIFIED LOCATION: ICD-10-CM

## 2018-02-07 DIAGNOSIS — J20.9 ACUTE BRONCHITIS, UNSPECIFIED ORGANISM: Primary | ICD-10-CM

## 2018-02-07 PROCEDURE — 1159F MED LIST DOCD IN RCRD: CPT | Mod: S$GLB,,, | Performed by: NURSE PRACTITIONER

## 2018-02-07 PROCEDURE — 99999 PR PBB SHADOW E&M-EST. PATIENT-LVL IV: CPT | Mod: PBBFAC,,, | Performed by: NURSE PRACTITIONER

## 2018-02-07 PROCEDURE — 1125F AMNT PAIN NOTED PAIN PRSNT: CPT | Mod: S$GLB,,, | Performed by: NURSE PRACTITIONER

## 2018-02-07 PROCEDURE — 3008F BODY MASS INDEX DOCD: CPT | Mod: S$GLB,,, | Performed by: NURSE PRACTITIONER

## 2018-02-07 PROCEDURE — 99214 OFFICE O/P EST MOD 30 MIN: CPT | Mod: S$GLB,,, | Performed by: NURSE PRACTITIONER

## 2018-02-07 RX ORDER — BENZONATATE 100 MG/1
100 CAPSULE ORAL 3 TIMES DAILY PRN
Qty: 30 CAPSULE | Refills: 0 | Status: SHIPPED | OUTPATIENT
Start: 2018-02-07 | End: 2018-02-17

## 2018-02-07 RX ORDER — AZITHROMYCIN 500 MG/1
500 TABLET, FILM COATED ORAL DAILY
Qty: 5 TABLET | Refills: 0 | Status: SHIPPED | OUTPATIENT
Start: 2018-02-07 | End: 2018-03-26 | Stop reason: ALTCHOICE

## 2018-02-07 NOTE — PATIENT INSTRUCTIONS
Bronchitis, Antibiotic Treatment (Adult)    Bronchitis is an infection of the air passages (bronchial tubes) in your lungs. It often occurs when you have a cold. This illness is contagious during the first few days and is spread through the air by coughing and sneezing, or by direct contact (touching the sick person and then touching your own eyes, nose, or mouth).  Symptoms of bronchitis include cough with mucus (phlegm) and low-grade fever. Bronchitis usually lasts 7 to 14 days. Mild cases can be treated with simple home remedies. More severe infection is treated with an antibiotic.  Home care  Follow these guidelines when caring for yourself at home:  · If your symptoms are severe, rest at home for the first 2 to 3 days. When you go back to your usual activities, don't let yourself get too tired.  · Do not smoke. Also avoid being exposed to secondhand smoke.  · You may use over-the-counter medicines to control fever or pain, unless another medicine was prescribed. (Note: If you have chronic liver or kidney disease or have ever had a stomach ulcer or gastrointestinal bleeding, talk with your healthcare provider before using these medicines. Also talk to your provider if you are taking medicine to prevent blood clots.) Aspirin should never be given to anyone younger than 18 years of age who is ill with a viral infection or fever. It may cause severe liver or brain damage.  · Your appetite may be poor, so a light diet is fine. Avoid dehydration by drinking 6 to 8 glasses of fluids per day (such as water, soft drinks, sports drinks, juices, tea, or soup). Extra fluids will help loosen secretions in the nose and lungs.  · Over-the-counter cough, cold, and sore-throat medicines will not shorten the length of the illness, but they may be helpful to reduce symptoms. (Note: Do not use decongestants if you have high blood pressure.)  · Finish all antibiotic medicine. Do this even if you are feeling better after only a  few days.  Follow-up care  Follow up with your healthcare provider, or as advised. If you had an X-ray or ECG (electrocardiogram), a specialist will review it. You will be notified of any new findings that may affect your care.  Note: If you are age 65 or older, or if you have a chronic lung disease or condition that affects your immune system, or you smoke, talk to your healthcare provider about having pneumococcal vaccinations and a yearly influenza vaccination (flu shot).  When to seek medical advice  Call your healthcare provider right away if any of these occur:  · Fever of 100.4°F (38°C) or higher  · Coughing up increased amounts of colored sputum  · Weakness, drowsiness, headache, facial pain, ear pain, or a stiff neck  Call 911, or get immediate medical care  Contact emergency services right away if any of these occur.  · Coughing up blood  · Worsening weakness, drowsiness, headache, or stiff neck  · Trouble breathing, wheezing, or pain with breathing  Date Last Reviewed: 9/13/2015  © 8543-4225 [employer], [employer]. [address] [place] [address], [place] [address]. All rights reserved. This information is not intended as a substitute for professional medical care. Always follow your healthcare professional's instructions.

## 2018-02-07 NOTE — PROGRESS NOTES
CC:COUGH  HPI:This is a new problem.   Isamar Roe is a 82 y.o. female with a history of cough.  The current episode started in the past 10 days.   The problem has been gradually worsening.   Associated symptoms included nasal congestion, rhinorrhea, cough.   Pertinent negatives include hemoptysis, dyspnea, wheezing   Treatments tried: Robitussin-DM and Mucinex has been used and this has provided no relief.     Review of patient's allergies indicates:   Allergen Reactions    Ace inhibitors      Other reaction(s): cough    Chlorthalidone      Other reaction(s): ? dizzy  Other reaction(s): ? dizzy    Codeine Nausea And Vomiting    Demerol (pf)  [meperidine (pf)]      Other reaction(s): Itching    Fosamax  [alendronate]      Other reaction(s): aches    Fosamax plus d  [alendronate-vitamin d3]      Other reaction(s): aches    Hydrochlorothiazide (bulk)      Other reaction(s): bad taste    Hydrocodone-acetaminophen      Other reaction(s): Stomach upset  Other reaction(s): Nausea    Ibandronate      Other reaction(s): aches  Other reaction(s): aches    Ibandronate sodium      Other reaction(s): Unknown    Meperidine Itching    Opioids - morphine analogues     Opium     Statins-hmg-coa reductase inhibitors      Few statins intol    Toprol xl  [metoprolol succinate]      Other reaction(s): diarrhea  Other reaction(s): diarrhea    Tramadol        Outpatient Medications Prior to Visit   Medication Sig Dispense Refill    ascorbic acid, vitamin C, (VITAMIN C) 500 MG tablet Take 500 mg by mouth once daily.      CALCIUM CITRATE/VITAMIN D2 (CALCIUM CITRATE WITH D ORAL) Take 1 tablet by mouth 2 (two) times daily.      estradiol (ESTRACE) 0.5 MG tablet TAKE 1 TABLET ONE TIME DAILY 90 tablet 3    fish oil-fat acid comb.8-hb137 (OMEGA 3-6-9) 1,200 mg Cap Take 1 capsule by mouth once daily.       losartan (COZAAR) 50 MG tablet TAKE 1 TABLET EVERY DAY 90 tablet 3    niacin, inositol niacinate, 400 mg niacin (500  "mg) Cap Take 1 capsule by mouth once daily.       turmeric root extract 500 mg Cap Take 2 capsules by mouth once daily.      verapamil (VERELAN) 120 MG C24P TAKE 1 CAPSULE ONE TIME DAILY 90 capsule 3    vitamin E 400 UNIT capsule Take 400 Units by mouth once daily.      niacin, inositol niacinate, (NIACIN FLUSH FREE) 400 mg Cap Take 1 capsule by mouth Every PM.      fluticasone (FLONASE) 50 mcg/actuation nasal spray 2 sprays by Each Nare route once daily. 16 g 1    hydrocortisone 2.5 % cream Apply to seborrhea at night if needed.      ondansetron (ZOFRAN) 4 MG tablet Take 4 mg by mouth.      calcium-vitamin D 250-100 mg-unit per tablet Take 1 tablet by mouth.       Facility-Administered Medications Prior to Visit   Medication Dose Route Frequency Provider Last Rate Last Dose    denosumab (PROLIA) injection 60 mg  60 mg Subcutaneous Q6 Months Leticia Maya MD        denosumab (PROLIA) injection 60 mg  60 mg Subcutaneous 1 time in Clinic/HOD Leticia Maya MD            Physical Exam   BP (!) 160/68 (BP Location: Left arm, Patient Position: Sitting)   Pulse 73   Temp 97.9 °F (36.6 °C) (Tympanic)   Ht 4' 10" (1.473 m)   Wt 58.6 kg (129 lb 1.3 oz)   SpO2 98%   BMI 26.98 kg/m²   Constitutional: The patient appears well-developed and well-nourished.   Head: Normocephalic and atraumatic.   Right Ear: Tympanic membrane and ear canal normal. No drainage, swelling or tenderness. Tympanic membrane is not injected, not erythematous and not bulging.   Left Ear: Ear canal normal. No drainage, swelling or tenderness. Tympanic membrane is not injected, not erythematous and not bulging.   Nose:  No mucosal edema. Rhinitis is noted.      Mouth/Throat: Uvula is midline.  Posterior oropharynx is not erythematous. No oropharyngeal exudate is noted.    Cardiovascular: Normal rate, regular rhythm, S1 normal, S2 normal and normal heart sounds.  Exam reveals no gallop, no S3, no S4 and no friction rub.    No murmur " heard.  Pulmonary/Chest: Effort normal and breath sounds are coarse. No stridor. Not tachypneic. No respiratory distress. The patient has no wheezes. The patient has no rhonchi. The patient has no rales.   Skin: The patient is not diaphoretic.     Encounter Diagnoses   Name Primary?    Acute bronchitis, unspecified organism Yes    Acute sinusitis, recurrence not specified, unspecified location        PLAN:    Isamar was seen today for cough.    Diagnoses and all orders for this visit:    Acute bronchitis, unspecified organism  -     benzonatate (TESSALON) 100 MG capsule; Take 1 capsule (100 mg total) by mouth 3 (three) times daily as needed.    Acute sinusitis, recurrence not specified, unspecified location  -     azithromycin (ZITHROMAX) 500 MG tablet; Take 1 tablet (500 mg total) by mouth once daily.        Medications Ordered This Encounter      azithromycin (ZITHROMAX) 500 MG tablet          Sig: Take 1 tablet (500 mg total) by mouth once daily.          Dispense:  5 tablet          Refill:  0      benzonatate (TESSALON) 100 MG capsule          Sig: Take 1 capsule (100 mg total) by mouth 3 (three) times daily as needed.          Dispense:  30 capsule          Refill:  0  No orders of the defined types were placed in this encounter.    RTC if symptoms are worsening or changing significantly or if not improved by the end of therapy.

## 2018-02-15 ENCOUNTER — OFFICE VISIT (OUTPATIENT)
Dept: OPHTHALMOLOGY | Facility: CLINIC | Age: 83
End: 2018-02-15
Payer: MEDICARE

## 2018-02-15 DIAGNOSIS — H25.13 NUCLEAR SCLEROSIS, BILATERAL: Primary | ICD-10-CM

## 2018-02-15 DIAGNOSIS — D31.31 CHOROIDAL NEVUS OF RIGHT EYE: ICD-10-CM

## 2018-02-15 DIAGNOSIS — Z46.0 CONTACT LENS/GLASSES FITTING: Primary | ICD-10-CM

## 2018-02-15 PROCEDURE — 99499 UNLISTED E&M SERVICE: CPT | Mod: S$GLB,,, | Performed by: OPTOMETRIST

## 2018-02-15 PROCEDURE — 99999 PR PBB SHADOW E&M-EST. PATIENT-LVL II: CPT | Mod: PBBFAC,,, | Performed by: OPHTHALMOLOGY

## 2018-02-15 PROCEDURE — 92310 CONTACT LENS FITTING OU: CPT | Mod: ,,, | Performed by: OPTOMETRIST

## 2018-02-15 PROCEDURE — 92014 COMPRE OPH EXAM EST PT 1/>: CPT | Mod: S$GLB,,, | Performed by: OPHTHALMOLOGY

## 2018-02-15 NOTE — PROGRESS NOTES
HPI     Last [facility] exam 01/09/2017  CL update   OD Distance OS Near   No visual complaints  Patient ran out of CL's back in December     Last edited by Sonido Garibay MA on 2/15/2018  1:15 PM. (History)            Assessment /Plan     For exam results, see Encounter Report.    Contact lens/glasses fitting      Updated CL and spec Rx.  RTC one year.

## 2018-02-15 NOTE — LETTER
February 15, 2018      CASEY Giang, OD  [address]  [place] [address]           [facility]  [address] [place] [address]  Phone: [phone]  Fax: [phone]          Patient: Isamar Roe   MR Number: [id number]   YOB: 1935   Date of Visit: 2/15/2018       Dear Dr. CASEY Giang:    Thank you for referring Isamar Roe to me for evaluation. Attached you will find relevant portions of my assessment and plan of care.    If you have questions, please do not hesitate to call me. I look forward to following Isamar Roe along with you.    Sincerely,    Jaxon Rajan MD    Enclosure  CC:  No Recipients    If you would like to receive this communication electronically, please contact [email or web address] or [phone] to request more information on [employer] Link access.    For providers and/or their staff who would like to refer a patient to [facility], please contact us through our one-stop-shop provider referral line, [facility] [place], at [phone].    If you feel you have received this communication in error or would no longer like to receive these types of communications, please e-mail [email or web address]

## 2018-02-16 ENCOUNTER — PES CALL (OUTPATIENT)
Dept: ADMINISTRATIVE | Facility: CLINIC | Age: 83
End: 2018-02-16

## 2018-02-26 ENCOUNTER — OFFICE VISIT (OUTPATIENT)
Dept: ENDOCRINOLOGY | Facility: CLINIC | Age: 83
End: 2018-02-26
Payer: MEDICARE

## 2018-02-26 VITALS
BODY MASS INDEX: 27.48 KG/M2 | DIASTOLIC BLOOD PRESSURE: 64 MMHG | HEIGHT: 58 IN | SYSTOLIC BLOOD PRESSURE: 134 MMHG | TEMPERATURE: 97 F | WEIGHT: 130.94 LBS | HEART RATE: 76 BPM

## 2018-02-26 DIAGNOSIS — K21.9 HIATAL HERNIA WITH GASTROESOPHAGEAL REFLUX: ICD-10-CM

## 2018-02-26 DIAGNOSIS — K44.9 HIATAL HERNIA WITH GASTROESOPHAGEAL REFLUX: ICD-10-CM

## 2018-02-26 DIAGNOSIS — M81.0 OSTEOPOROSIS, UNSPECIFIED OSTEOPOROSIS TYPE, UNSPECIFIED PATHOLOGICAL FRACTURE PRESENCE: Primary | ICD-10-CM

## 2018-02-26 PROCEDURE — 1126F AMNT PAIN NOTED NONE PRSNT: CPT | Mod: S$GLB,,, | Performed by: INTERNAL MEDICINE

## 2018-02-26 PROCEDURE — 3008F BODY MASS INDEX DOCD: CPT | Mod: S$GLB,,, | Performed by: INTERNAL MEDICINE

## 2018-02-26 PROCEDURE — 99999 PR PBB SHADOW E&M-EST. PATIENT-LVL III: CPT | Mod: PBBFAC,,, | Performed by: INTERNAL MEDICINE

## 2018-02-26 PROCEDURE — 99499 UNLISTED E&M SERVICE: CPT | Mod: S$GLB,,, | Performed by: INTERNAL MEDICINE

## 2018-02-26 PROCEDURE — 1159F MED LIST DOCD IN RCRD: CPT | Mod: S$GLB,,, | Performed by: INTERNAL MEDICINE

## 2018-02-26 PROCEDURE — 99213 OFFICE O/P EST LOW 20 MIN: CPT | Mod: 25,S$GLB,, | Performed by: INTERNAL MEDICINE

## 2018-02-26 NOTE — PROGRESS NOTES
Patient ID: Isamar Roe is a 82 y.o. female.  Patient is here for follow up    Patient says she was unaware she was here for Prolia injection but had questions before proceeding    Chief Complaint: Osteoporosis      HPI      Isamar Roe is a 82 y.o. female who I am asked to see in consultation for evaluation of osteoporosis. She was diagnosed with osteoporosis by a bone density scan in 2014- may have had osteopenia prior- last BMD 10/2016 showed worseing in L-spine(t-2.6). Patient denies history of fracture. The cause of osteoporosis is felt to be due to postmenopausal estrogen deficiency. She is currently being treated with calcium and vitamin D supplementation. She is not currently being treated with bisphosphonates.  When she tried Fosamax and Boniva she had aching all over and joint pain      Osteoporosis Risk Factors   Nonmodifiable  Personal Hx of fracture as an adult: no  Hx of fracture in first-degree relative: no   race: yes  Advanced age: yes  Female sex: yes  Dementia: no  Poor health/frailty: no     Potentially modifiable:  Tobacco use: no  Low body weight (<127 lbs): no  Estrogen deficiency     early menopause (age <45) or bilateral ovariectomy: no     prolonged premenopausal amenorrhea (>1 yr): no  Low calcium intake (lifelong): no  Alcoholism: no  Recurrent falls: no  Inadequate physical activity: no    Has GERD gaviscon prn    Current calcium and Vit D intake: 2  calcium tabs 600mg tabs with D supper   Dietary sources: 2 to 3 cups of milk  Supplements: n/a    Since last visit she had blood work in 24-hour urine  I have reviewed the past medical, family and social history    Review of Systems   Constitutional: Negative for appetite change, fatigue, fever and unexpected weight change.   HENT: Negative for sore throat and trouble swallowing.    Eyes: Negative for visual disturbance.   Respiratory: Negative for shortness of breath and wheezing.    Cardiovascular: Negative for chest pain,  palpitations and leg swelling.   Gastrointestinal: Negative for diarrhea, nausea and vomiting.   Endocrine: Negative for cold intolerance, heat intolerance, polydipsia, polyphagia and polyuria.   Genitourinary: Negative for difficulty urinating, dysuria and menstrual problem.   Musculoskeletal: Negative for arthralgias and joint swelling.   Skin: Negative for rash.   Neurological: Negative for dizziness, weakness, numbness and headaches.   Psychiatric/Behavioral: Negative for confusion, dysphoric mood and sleep disturbance.       Objective:      Physical Exam   Constitutional: She appears well-developed and well-nourished. No distress.   HENT:   Head: Normocephalic and atraumatic.   Eyes: Conjunctivae are normal.   Neurological: She is alert. No sensory deficit.        Skin: Skin is warm and dry. No rash noted. She is not diaphoretic. No erythema.   Psychiatric: She has a normal mood and affect. Her behavior is normal.   Vitals reviewed.        Lab Review:   Lab Visit on 10/30/2017   Component Date Value    Urine Volume 10/30/2017 1350     Urine Collection Duration 10/30/2017 24     Urine, Creatinine 10/30/2017 45.0     Creatinine, Timed Urine 10/30/2017 25.3*    Creatinine, Ur (mg/spec) 10/30/2017 607.5     Urine Volume 10/30/2017 1350     Urine Collection Duration 10/30/2017 24     Calcium, Urine 10/30/2017 15.7*    Calcium, 24H Urine 10/30/2017 9     CA Urine (mg/Spec) 10/30/2017 212     Urine Volume 10/30/2017 1350     Urine Collection Duration 10/30/2017 24     Sodium, Urine 10/30/2017 78     Sodium, Timed Ur 10/30/2017 4.4     NA, UR mmol/spec 10/30/2017 105    Lab Visit on 10/18/2017   Component Date Value    Alkaline Phosphatase 10/18/2017 86     PTH, Intact 10/18/2017 42.0     Vit D, 25-Hydroxy 10/18/2017 37     Phosphorus 10/18/2017 3.0     Calcium 10/18/2017 9.7     Albumin 10/18/2017 3.4*   Lab Visit on 08/28/2017   Component Date Value    Cholesterol 08/28/2017 233*     Triglycerides 08/28/2017 166*    HDL 08/28/2017 64     LDL Cholesterol 08/28/2017 135.8     HDL/Chol Ratio 08/28/2017 27.5     Total Cholesterol/HDL Ra* 08/28/2017 3.6     Non-HDL Cholesterol 08/28/2017 169     Sodium 08/28/2017 142     Potassium 08/28/2017 4.4     Chloride 08/28/2017 108     CO2 08/28/2017 29     Glucose 08/28/2017 79     BUN, Bld 08/28/2017 15     Creatinine 08/28/2017 0.7     Calcium 08/28/2017 9.3     Anion Gap 08/28/2017 5*    eGFR if African American 08/28/2017 >60.0     eGFR if non  Amer* 08/28/2017 >60.0        Assessment:     1. Osteoporosis, unspecified osteoporosis type, unspecified pathological fracture presence     2. Hiatal hernia with gastroesophageal reflux      QUESTIONS were answered and she is comfortable now with being treated with Prolia  Alternatives treatment was discussed such as IV bisphosphonates she is not comfortable with this because of her previous problems with oral bisphosphonates  Plan:   Osteoporosis, unspecified osteoporosis type, unspecified pathological fracture presence    Hiatal hernia with gastroesophageal reflux    Other orders  -     denosumab (PROLIA) injection 60 mg; Inject 1 mL (60 mg total) into the skin one time.          Follow-up in about 6 months (around 8/26/2018).    Labs prior to appointment? not applicable     Disclaimer:  This note may have been partially prepared using voice recognition software and  it may have not been extensively proofed, as such there could be errors within the text such as sound alike errors.

## 2018-03-12 ENCOUNTER — PATIENT OUTREACH (OUTPATIENT)
Dept: ADMINISTRATIVE | Facility: HOSPITAL | Age: 83
End: 2018-03-12

## 2018-03-26 ENCOUNTER — OFFICE VISIT (OUTPATIENT)
Dept: INTERNAL MEDICINE | Facility: CLINIC | Age: 83
End: 2018-03-26
Payer: MEDICARE

## 2018-03-26 VITALS
BODY MASS INDEX: 26.71 KG/M2 | HEIGHT: 59 IN | SYSTOLIC BLOOD PRESSURE: 122 MMHG | WEIGHT: 132.5 LBS | HEART RATE: 65 BPM | DIASTOLIC BLOOD PRESSURE: 76 MMHG | OXYGEN SATURATION: 98 %

## 2018-03-26 DIAGNOSIS — Z78.9 STATIN INTOLERANCE: ICD-10-CM

## 2018-03-26 DIAGNOSIS — M81.0 OSTEOPOROSIS, UNSPECIFIED OSTEOPOROSIS TYPE, UNSPECIFIED PATHOLOGICAL FRACTURE PRESENCE: Chronic | ICD-10-CM

## 2018-03-26 DIAGNOSIS — I10 ESSENTIAL HYPERTENSION: Chronic | ICD-10-CM

## 2018-03-26 DIAGNOSIS — I70.0 ARTERIOSCLEROSIS OF AORTA: ICD-10-CM

## 2018-03-26 DIAGNOSIS — E78.5 HYPERLIPIDEMIA, UNSPECIFIED HYPERLIPIDEMIA TYPE: Chronic | ICD-10-CM

## 2018-03-26 DIAGNOSIS — Z00.00 ENCOUNTER FOR PREVENTIVE HEALTH EXAMINATION: Primary | ICD-10-CM

## 2018-03-26 DIAGNOSIS — Z85.828 HISTORY OF SKIN CANCER: ICD-10-CM

## 2018-03-26 PROCEDURE — 99499 UNLISTED E&M SERVICE: CPT | Mod: S$GLB,,, | Performed by: NURSE PRACTITIONER

## 2018-03-26 PROCEDURE — G0439 PPPS, SUBSEQ VISIT: HCPCS | Mod: S$GLB,,, | Performed by: NURSE PRACTITIONER

## 2018-03-26 PROCEDURE — 99999 PR PBB SHADOW E&M-EST. PATIENT-LVL IV: CPT | Mod: PBBFAC,,, | Performed by: NURSE PRACTITIONER

## 2018-03-26 NOTE — PROGRESS NOTES
"Isamar Roe presented for a  Medicare AWV and comprehensive Health Risk Assessment today. The following components were reviewed and updated:    · Medical history  · Family History  · Social history  · Allergies and Current Medications  · Health Risk Assessment  · Health Maintenance  · Care Team     ** See Completed Assessments for Annual Wellness Visit within the encounter summary.**       The following assessments were completed:  · Living Situation  · CAGE  · Depression Screening  · Timed Get Up and Go  · Whisper Test  · Cognitive Function Screening  · Nutrition Screening  · ADL Screening  · PAQ Screening    Vitals:    03/26/18 0802   BP: 122/76   BP Location: Left arm   Patient Position: Sitting   BP Method: Medium (Manual)   Pulse: 65   SpO2: 98%   Weight: 60.1 kg (132 lb 7.9 oz)   Height: 4' 11" (1.499 m)     Body mass index is 26.76 kg/m².  Physical Exam   Constitutional: She is oriented to person, place, and time. She appears well-developed and well-nourished.   HENT:   Head: Normocephalic.   Cardiovascular: Normal rate, regular rhythm and normal heart sounds.    No murmur heard.  Pulmonary/Chest: Effort normal. No respiratory distress. She has rales in the left upper field.   Abdominal: Soft. She exhibits no mass. There is no tenderness.   Musculoskeletal: Normal range of motion. She exhibits no edema.   Neurological: She is alert and oriented to person, place, and time. She exhibits normal muscle tone.   Skin: Skin is warm, dry and intact.   Psychiatric: She has a normal mood and affect. Her speech is normal and behavior is normal.   Nursing note and vitals reviewed.        Diagnoses and health risks identified today and associated recommendations/orders:    1. Encounter for preventive health examination  Recommended to get outside records sent to PCP to be scanned into chart. She expressed understanding.       2. Essential hypertension  Stable and controlled. Continue current treatment plan as previously " "prescribed with your PCP.     3. Hyperlipidemia, unspecified hyperlipidemia type  Reports she is intolerant to statin medications.   Not at goal.   Encouraged healthy diet and exercise as tolerated.  Continue current treatment plan as previously prescribed with your PCP.      4. Statin intolerance  See #3    5. Arteriosclerosis of aorta  CT Abdomen/Pelvis 4/2/2012---Arteriosclerotic disease in the aorta and iliac arteries.   Discussed diagnosis and risk reduction. She expressed understanding.     Stable. Continue current treatment plan as previously prescribed with your PCP.      6. Osteoporosis, unspecified osteoporosis type, unspecified pathological fracture presence  DEXA 9/27/2016  Received Prolia injection last month. Reports since injection has noted joint stiffness and aches (denies any worsening).   Advised to follow up with endocrinologist, Dr. Maya for further evaluation and treatment. She expressed understanding.      7. History of skin cancer  Per patient s/p multiple excisions, no chemo or radiation.   Continue current treatment plan as previously prescribed with your outside dermatologist, Dr. Noriega.     8. Rales noted to left upper lung field. O2 sat 98%. She is in no distress.  She was seen in February (2/7/2018) by internal medicine NP, NIALL Jj, and diagnosed with sinusitis and bronchitis. Per NIALL Jj' note-"...Pulmonary/Chest: Effort normal and breath sounds are coarse..."  Patient denies SOB, cough, upper respiratory symptoms, or fever.   Advised to follow up with PCP for continued monitoring and recommendations. Advised to follow up sooner if any symptoms develop. She expressed understanding.      9. Abnormal whisper test-left abnormal; right normal  Reports she has noticed some hearing difficulties in the left ear, ongoing for a couple of years. Reports has discussed in past with PCP.   Advised to follow up with PCP for conituned monitoring and recommendations. She expressed understanding. "      Provided Isamar with a 5-10 year written screening schedule and personal prevention plan.  Education, counseling, and referrals were provided as needed. After Visit Summary printed and given to patient which includes a list of additional screenings\tests needed.    Follow-up in about 1 year (around 3/26/2019) for AWV.    Melisa Rosenberg NP

## 2018-03-26 NOTE — PATIENT INSTRUCTIONS
Counseling and Referral of Other Preventative  (Italic type indicates deductible and co-insurance are waived)    Patient Name: Isamar Roe  Today's Date: 3/26/2018    Health Maintenance       Date Due Completion Date    TETANUS VACCINE 06/25/2018 6/25/2008    Lipid Panel 08/28/2018 8/28/2017    DEXA SCAN 09/27/2018 9/27/2016    Override on 7/5/2012: Done (Recheck 2 years)        No orders of the defined types were placed in this encounter.    The following information is provided to all patients.  This information is to help you find resources for any of the problems found today that may be affecting your health:                Living healthy guide: www.[facility].[place].gov      Understanding Diabetes: www.diabetes.org      Eating healthy: www.cdc.gov/healthyweight      CDC home safety checklist: www.cdc.gov/steadi/patient.html      Agency on Aging: www.goea.[place].[facility]      Alcoholics anonymous (AA): www.aa.org      Physical Activity: www.juan.nih.gov/dm7lxiq      Tobacco use: www.quitwithusla.org

## 2018-03-26 NOTE — Clinical Note
Your patient was seen today for a HRA visit. Abnormalities (BOLDED) have been identified during this visit that may require additional testing and  follow up. I have included a copy of my visit note, please review the note and feel free to contact me with any questions.  Thank you for allowing me to participate in the care of your patients.  Melisa Rosenberg NP

## 2018-04-16 ENCOUNTER — TELEPHONE (OUTPATIENT)
Dept: ENDOCRINOLOGY | Facility: CLINIC | Age: 83
End: 2018-04-16

## 2018-04-16 NOTE — TELEPHONE ENCOUNTER
Pt stated that she has been taking calcium and vitamin D. An appt has been scheduled for 04/18/18. MD advised.

## 2018-04-16 NOTE — TELEPHONE ENCOUNTER
Pt stated that she has been having constant pain (7 on a scale of 1 -10) in joints and BLE, since taking the Prolia shot in February. Pt stated that she has been having to take Tylenol twice daily. Please advise.

## 2018-04-18 ENCOUNTER — LAB VISIT (OUTPATIENT)
Dept: LAB | Facility: HOSPITAL | Age: 83
End: 2018-04-18
Attending: INTERNAL MEDICINE
Payer: MEDICARE

## 2018-04-18 ENCOUNTER — OFFICE VISIT (OUTPATIENT)
Dept: ENDOCRINOLOGY | Facility: CLINIC | Age: 83
End: 2018-04-18
Payer: MEDICARE

## 2018-04-18 VITALS
HEART RATE: 70 BPM | HEIGHT: 58 IN | BODY MASS INDEX: 27.76 KG/M2 | SYSTOLIC BLOOD PRESSURE: 132 MMHG | WEIGHT: 132.25 LBS | DIASTOLIC BLOOD PRESSURE: 62 MMHG | TEMPERATURE: 96 F

## 2018-04-18 DIAGNOSIS — R52 BODY ACHES: ICD-10-CM

## 2018-04-18 DIAGNOSIS — T50.905A ADVERSE EFFECT OF DRUG, INITIAL ENCOUNTER: ICD-10-CM

## 2018-04-18 DIAGNOSIS — M81.0 OSTEOPOROSIS, UNSPECIFIED OSTEOPOROSIS TYPE, UNSPECIFIED PATHOLOGICAL FRACTURE PRESENCE: ICD-10-CM

## 2018-04-18 DIAGNOSIS — M81.0 OSTEOPOROSIS, UNSPECIFIED OSTEOPOROSIS TYPE, UNSPECIFIED PATHOLOGICAL FRACTURE PRESENCE: Primary | ICD-10-CM

## 2018-04-18 LAB
25(OH)D3+25(OH)D2 SERPL-MCNC: 43 NG/ML
ALBUMIN SERPL BCP-MCNC: 4.1 G/DL
ALP SERPL-CCNC: 65 U/L
ALT SERPL W/O P-5'-P-CCNC: 15 U/L
ANION GAP SERPL CALC-SCNC: 7 MMOL/L
AST SERPL-CCNC: 19 U/L
BASOPHILS # BLD AUTO: 0.04 K/UL
BASOPHILS NFR BLD: 0.7 %
BILIRUB SERPL-MCNC: 0.4 MG/DL
BUN SERPL-MCNC: 19 MG/DL
CALCIUM SERPL-MCNC: 10 MG/DL
CHLORIDE SERPL-SCNC: 104 MMOL/L
CK SERPL-CCNC: 44 U/L
CO2 SERPL-SCNC: 29 MMOL/L
CREAT SERPL-MCNC: 0.7 MG/DL
DIFFERENTIAL METHOD: ABNORMAL
EOSINOPHIL # BLD AUTO: 0.1 K/UL
EOSINOPHIL NFR BLD: 1.1 %
ERYTHROCYTE [DISTWIDTH] IN BLOOD BY AUTOMATED COUNT: 13 %
ERYTHROCYTE [SEDIMENTATION RATE] IN BLOOD BY WESTERGREN METHOD: 3 MM/HR
EST. GFR  (AFRICAN AMERICAN): >60 ML/MIN/1.73 M^2
EST. GFR  (NON AFRICAN AMERICAN): >60 ML/MIN/1.73 M^2
GLUCOSE SERPL-MCNC: 85 MG/DL
HCT VFR BLD AUTO: 43.3 %
HGB BLD-MCNC: 13.6 G/DL
IMM GRANULOCYTES # BLD AUTO: 0.01 K/UL
IMM GRANULOCYTES NFR BLD AUTO: 0.2 %
LYMPHOCYTES # BLD AUTO: 2.4 K/UL
LYMPHOCYTES NFR BLD: 43.9 %
MCH RBC QN AUTO: 29.5 PG
MCHC RBC AUTO-ENTMCNC: 31.4 G/DL
MCV RBC AUTO: 94 FL
MONOCYTES # BLD AUTO: 0.6 K/UL
MONOCYTES NFR BLD: 11.2 %
NEUTROPHILS # BLD AUTO: 2.4 K/UL
NEUTROPHILS NFR BLD: 42.9 %
NRBC BLD-RTO: 0 /100 WBC
PHOSPHATE SERPL-MCNC: 3.3 MG/DL
PLATELET # BLD AUTO: 276 K/UL
PMV BLD AUTO: 9.8 FL
POTASSIUM SERPL-SCNC: 4.7 MMOL/L
PROT SERPL-MCNC: 7.4 G/DL
RBC # BLD AUTO: 4.61 M/UL
SODIUM SERPL-SCNC: 140 MMOL/L
WBC # BLD AUTO: 5.56 K/UL

## 2018-04-18 PROCEDURE — 99999 PR PBB SHADOW E&M-EST. PATIENT-LVL III: CPT | Mod: PBBFAC,,, | Performed by: INTERNAL MEDICINE

## 2018-04-18 PROCEDURE — 80053 COMPREHEN METABOLIC PANEL: CPT

## 2018-04-18 PROCEDURE — 83970 ASSAY OF PARATHORMONE: CPT

## 2018-04-18 PROCEDURE — 82306 VITAMIN D 25 HYDROXY: CPT

## 2018-04-18 PROCEDURE — 3078F DIAST BP <80 MM HG: CPT | Mod: CPTII,S$GLB,, | Performed by: INTERNAL MEDICINE

## 2018-04-18 PROCEDURE — 3075F SYST BP GE 130 - 139MM HG: CPT | Mod: CPTII,S$GLB,, | Performed by: INTERNAL MEDICINE

## 2018-04-18 PROCEDURE — 99499 UNLISTED E&M SERVICE: CPT | Mod: S$GLB,,, | Performed by: INTERNAL MEDICINE

## 2018-04-18 PROCEDURE — 99214 OFFICE O/P EST MOD 30 MIN: CPT | Mod: S$GLB,,, | Performed by: INTERNAL MEDICINE

## 2018-04-18 PROCEDURE — 82550 ASSAY OF CK (CPK): CPT

## 2018-04-18 PROCEDURE — 86431 RHEUMATOID FACTOR QUANT: CPT

## 2018-04-18 PROCEDURE — 84100 ASSAY OF PHOSPHORUS: CPT

## 2018-04-18 PROCEDURE — 36415 COLL VENOUS BLD VENIPUNCTURE: CPT | Mod: PO

## 2018-04-18 PROCEDURE — 85025 COMPLETE CBC W/AUTO DIFF WBC: CPT

## 2018-04-18 PROCEDURE — 85651 RBC SED RATE NONAUTOMATED: CPT | Mod: PO

## 2018-04-18 PROCEDURE — 86038 ANTINUCLEAR ANTIBODIES: CPT

## 2018-04-19 LAB
ANA SER QL IF: NORMAL
PTH-INTACT SERPL-MCNC: 50 PG/ML
RHEUMATOID FACT SERPL-ACNC: 26 IU/ML

## 2018-04-19 NOTE — PROGRESS NOTES
Patient ID: Isamar Roe is a 83 y.o. female.  Patient is here for follow up        Chief Complaint: Osteoporosis      HPI    Chief Complaint: Osteoporosis    She is here with her   HPI      Isamar Roe is a 82 y.o. female who I am asked to see in consultation for evaluation of osteoporosis. She was diagnosed with osteoporosis by a bone density scan in 2014- may have had osteopenia prior- last BMD 10/2016 showed worseing in L-spine(t-2.6). Patient denies history of fracture. The cause of osteoporosis is felt to be due to postmenopausal estrogen deficiency. She is currently being treated with calcium and vitamin D supplementation. She is not currently being treated with bisphosphonates.  When she tried Fosamax and Boniva she had aching all over and joint pain      Osteoporosis Risk Factors   Nonmodifiable  Personal Hx of fracture as an adult: no  Hx of fracture in first-degree relative: no   race: yes  Advanced age: yes  Female sex: yes  Dementia: no  Poor health/frailty: no     Potentially modifiable:  Tobacco use: no  Low body weight (<127 lbs): no  Estrogen deficiency     early menopause (age <45) or bilateral ovariectomy: no     prolonged premenopausal amenorrhea (>1 yr): no  Low calcium intake (lifelong): no  Alcoholism: no  Recurrent falls: no  Inadequate physical activity: no    Has GERD gaviscon prn    Current calcium and Vit D intake: 2  calcium tabs 600mg tabs with D supper   Dietary sources: 2 to 3 cups of milk  Supplements: n/a    Patient has had her Prolia injection February 26 in about a week later she started developing body aches and they are more pronounced starting in her lower back and a spread down her legs but she also has some stiffness in her shoulders and arms as well.  In review of records she does have some lumbar arthropathy and did have spine surgery in the past but this is new type pain.  She is been taking Tylenol once or twice a day.  She did not have any falls and  she is taking her calcium as recommended.  She denies any fevers or rashes or itching    I have reviewed the past medical, family and social history    Review of Systems   Constitutional: Negative for appetite change, fatigue, fever and unexpected weight change.   HENT: Negative for sore throat and trouble swallowing.    Eyes: Negative for visual disturbance.   Respiratory: Negative for shortness of breath and wheezing.    Cardiovascular: Negative for chest pain, palpitations and leg swelling.   Gastrointestinal: Negative for diarrhea, nausea and vomiting.   Endocrine: Negative for cold intolerance, heat intolerance, polydipsia, polyphagia and polyuria.   Genitourinary: Negative for difficulty urinating, dysuria and menstrual problem.   Musculoskeletal: Positive for arthralgias, back pain and myalgias. Negative for joint swelling.   Skin: Negative for rash.   Neurological: Negative for dizziness, weakness, numbness and headaches.   Psychiatric/Behavioral: Negative for confusion, dysphoric mood and sleep disturbance.       Objective:      Physical Exam   Constitutional: She appears well-developed and well-nourished. No distress.   HENT:   Head: Normocephalic and atraumatic.   Eyes: Conjunctivae are normal.   Neck: No JVD present. No tracheal deviation present. No thyromegaly present.   Cardiovascular: Normal rate, regular rhythm, normal heart sounds and intact distal pulses.  Exam reveals no gallop and no friction rub.    No murmur heard.  Pulmonary/Chest: Effort normal and breath sounds normal. No stridor. No respiratory distress. She has no wheezes. She has no rales. She exhibits no tenderness.   Musculoskeletal: Normal range of motion. She exhibits no edema, tenderness or deformity.   Lymphadenopathy:     She has no cervical adenopathy.   Neurological: She is alert.   Skin: Skin is warm. No rash noted. She is not diaphoretic. No erythema. No pallor.   Vitals reviewed.        Lab Review:   Lab Visit on 10/30/2017    Component Date Value    Urine Volume 10/30/2017 1350     Urine Collection Duration 10/30/2017 24     Urine, Creatinine 10/30/2017 45.0     Creatinine, Timed Urine 10/30/2017 25.3*    Creatinine, Ur (mg/spec) 10/30/2017 607.5     Urine Volume 10/30/2017 1350     Urine Collection Duration 10/30/2017 24     Calcium, Urine 10/30/2017 15.7*    Calcium, 24H Urine 10/30/2017 9     CA Urine (mg/Spec) 10/30/2017 212     Urine Volume 10/30/2017 1350     Urine Collection Duration 10/30/2017 24     Sodium, Urine 10/30/2017 78     Sodium, Timed Ur 10/30/2017 4.4     NA, UR mmol/spec 10/30/2017 105        Assessment:     1. Osteoporosis, unspecified osteoporosis type, unspecified pathological fracture presence  Phosphorus    PTH, intact    Vitamin D    CK    Comprehensive metabolic panel    CBC auto differential   2. Adverse effect of drug, initial encounter  Phosphorus    PTH, intact    Vitamin D    CK    Comprehensive metabolic panel    CBC auto differential   3. Body aches      RHEUMATOID FACTOR    Sedimentation rate, manual    unfortunately she does seem to have an adverse effect to the Prolia-she has many side effects to many medications as listed under the ALLERGIES section and she can't take anti-inflammatory medication so we discussed her increasing the amount of Tylenol to try to get more relief and she will get the labs below.  She is going on the trip with her  starting tomorrow and she will be out of town for about 10 days.  If it worsens especially in any particular area I may consider doing x-rays.  Explained that it still will be important to continue anti-resorptive treatment especially in 6 months after her lastProlia injection because there is a risk of worsening bone loss if Prolia is abruptly stopped and not continued so she will keep her follow up with me in August        Plan:   Osteoporosis, unspecified osteoporosis type, unspecified pathological fracture presence  -      Phosphorus; Future; Expected date: 04/18/2018  -     PTH, intact; Future; Expected date: 04/18/2018  -     Vitamin D; Future; Expected date: 04/18/2018  -     CK; Future; Expected date: 04/18/2018  -     Comprehensive metabolic panel; Future; Expected date: 04/18/2018  -     CBC auto differential; Future; Expected date: 04/18/2018    Adverse effect of drug, initial encounter  -     Phosphorus; Future; Expected date: 04/18/2018  -     PTH, intact; Future; Expected date: 04/18/2018  -     Vitamin D; Future; Expected date: 04/18/2018  -     CK; Future; Expected date: 04/18/2018  -     Comprehensive metabolic panel; Future; Expected date: 04/18/2018  -     CBC auto differential; Future; Expected date: 04/18/2018    Body aches  -     ; Future; Expected date: 04/18/2018  -     RHEUMATOID FACTOR; Future; Expected date: 04/18/2018  -     Sedimentation rate, manual; Future; Expected date: 04/18/2018          No Follow-up on file.    Labs prior to appointment? not applicable     Disclaimer:  This note may have been partially prepared using voice recognition software and  it may have not been extensively proofed, as such there could be errors within the text such as sound alike errors.

## 2018-05-28 ENCOUNTER — TELEPHONE (OUTPATIENT)
Dept: ENDOCRINOLOGY | Facility: CLINIC | Age: 83
End: 2018-05-28

## 2018-05-28 NOTE — TELEPHONE ENCOUNTER
"Pt advised the following, per MD orders: labs were normal except her rheumatoid factor blood test was elevated.  This may be nonspecific but it can be elevated in patients who may have rheumatoid arthritis and I may refer her to the rheumatologist for further evaluation. Pt verbalized understanding.    Pt stated that she is still experiencing pain in her right hip, leg, and foot. When asked "On a scale of 1-10, what would you rate your pain?" pt stated "I can't say. But it is bad enough to take Tylenol." Please advise.  "

## 2018-06-04 DIAGNOSIS — M25.50 ARTHRALGIA, UNSPECIFIED JOINT: Primary | ICD-10-CM

## 2018-06-04 DIAGNOSIS — R76.8 ELEVATED RHEUMATOID FACTOR: ICD-10-CM

## 2018-08-27 ENCOUNTER — OFFICE VISIT (OUTPATIENT)
Dept: ENDOCRINOLOGY | Facility: CLINIC | Age: 83
End: 2018-08-27
Payer: MEDICARE

## 2018-08-27 VITALS
WEIGHT: 127.88 LBS | HEIGHT: 58 IN | TEMPERATURE: 98 F | SYSTOLIC BLOOD PRESSURE: 102 MMHG | BODY MASS INDEX: 26.85 KG/M2 | DIASTOLIC BLOOD PRESSURE: 68 MMHG | HEART RATE: 72 BPM

## 2018-08-27 DIAGNOSIS — M81.0 OSTEOPOROSIS, UNSPECIFIED OSTEOPOROSIS TYPE, UNSPECIFIED PATHOLOGICAL FRACTURE PRESENCE: Primary | ICD-10-CM

## 2018-08-27 DIAGNOSIS — R76.8 ELEVATED RHEUMATOID FACTOR: ICD-10-CM

## 2018-08-27 PROCEDURE — 99213 OFFICE O/P EST LOW 20 MIN: CPT | Mod: S$GLB,,, | Performed by: INTERNAL MEDICINE

## 2018-08-27 PROCEDURE — 99499 UNLISTED E&M SERVICE: CPT | Mod: HCNC,S$GLB,, | Performed by: INTERNAL MEDICINE

## 2018-08-27 PROCEDURE — 3078F DIAST BP <80 MM HG: CPT | Mod: CPTII,S$GLB,, | Performed by: INTERNAL MEDICINE

## 2018-08-27 PROCEDURE — 99999 PR PBB SHADOW E&M-EST. PATIENT-LVL III: CPT | Mod: PBBFAC,,, | Performed by: INTERNAL MEDICINE

## 2018-08-27 PROCEDURE — 3074F SYST BP LT 130 MM HG: CPT | Mod: CPTII,S$GLB,, | Performed by: INTERNAL MEDICINE

## 2018-08-27 NOTE — PROGRESS NOTES
Patient ID: Isamar Roe is a 83 y.o. female.  Patient is here for follow up        Chief Complaint: Osteoporosis      HPI   She is here with her   HPI      Isamar Roe is a 82 y.o. female who I am asked to see in consultation for evaluation of osteoporosis. She was diagnosed with osteoporosis by a bone density scan in 2014- may have had osteopenia prior- last BMD 10/2016 showed worseing in L-spine(t-2.6). Patient denies history of fracture. The cause of osteoporosis is felt to be due to postmenopausal estrogen deficiency. She is currently being treated with calcium and vitamin D supplementation. She is not currently being treated with bisphosphonates.  When she tried Fosamax and Boniva she had aching all over and joint pain      Osteoporosis Risk Factors   Nonmodifiable  Personal Hx of fracture as an adult: no  Hx of fracture in first-degree relative: no   race: yes  Advanced age: yes  Female sex: yes  Dementia: no  Poor health/frailty: no     Potentially modifiable:  Tobacco use: no  Low body weight (<127 lbs): no  Estrogen deficiency     early menopause (age <45) or bilateral ovariectomy: no     prolonged premenopausal amenorrhea (>1 yr): no  Low calcium intake (lifelong): no  Alcoholism: no  Recurrent falls: no  Inadequate physical activity: no    Has GERD gaviscon prn    Current calcium and Vit D intake: 2  calcium tabs 600mg tabs with D supper and today she states she also takes a daily vit d  supplement  Dietary sources: 2 to 3 cups of milk  Supplements: n/a    Patient has had her Prolia injection February 26 2018  And  about a week later she started developing body aches starting in her lower back and a spread down her legs but she also had some stiffness in her shoulders and arms as well.  In review of records she does have some lumbar arthropathy and did have spine surgery in the past .  She states the pain did eventually stop but it was not until after she read an article written by marleny  local physician who said too much calcium can cause bone pain so she stopped taking her calcium supplements  .  I ordered tests including an  which was negative and her rheumatoid factor however was elevated.  She denies much morning stiffness.  She has started going to an exercise group Program and plans to walk more  I have reviewed the past medical, family and social history    Review of Systems   Constitutional: Negative for appetite change, fatigue, fever and unexpected weight change.   HENT: Negative for sore throat and trouble swallowing.    Eyes: Negative for visual disturbance.   Respiratory: Negative for shortness of breath and wheezing.    Cardiovascular: Negative for chest pain, palpitations and leg swelling.   Gastrointestinal: Negative for diarrhea, nausea and vomiting.   Endocrine: Negative for cold intolerance, heat intolerance, polydipsia, polyphagia and polyuria.   Genitourinary: Negative for difficulty urinating, dysuria and menstrual problem.   Musculoskeletal: Negative for arthralgias and joint swelling.   Skin: Negative for rash.   Neurological: Negative for dizziness, weakness, numbness and headaches.   Psychiatric/Behavioral: Negative for confusion, dysphoric mood and sleep disturbance.       Objective:      Physical Exam   Constitutional: She appears well-developed and well-nourished. No distress.   HENT:   Head: Normocephalic and atraumatic.   Eyes: Conjunctivae are normal.   Neurological: She is alert. No sensory deficit.        Skin: Skin is warm and dry. No rash noted. She is not diaphoretic. No erythema.   Psychiatric: She has a normal mood and affect. Her behavior is normal.   Vitals reviewed.        Lab Review:   Lab Visit on 04/18/2018   Component Date Value    Phosphorus 04/18/2018 3.3     PTH, Intact 04/18/2018 50.0     Vit D, 25-Hydroxy 04/18/2018 43     CPK 04/18/2018 44     Sodium 04/18/2018 140     Potassium 04/18/2018 4.7     Chloride 04/18/2018 104     CO2  04/18/2018 29     Glucose 04/18/2018 85     BUN, Bld 04/18/2018 19     Creatinine 04/18/2018 0.7     Calcium 04/18/2018 10.0     Total Protein 04/18/2018 7.4     Albumin 04/18/2018 4.1     Total Bilirubin 04/18/2018 0.4     Alkaline Phosphatase 04/18/2018 65     AST 04/18/2018 19     ALT 04/18/2018 15     Anion Gap 04/18/2018 7*    eGFR if African American 04/18/2018 >60.0     eGFR if non  Amer* 04/18/2018 >60.0     WBC 04/18/2018 5.56     RBC 04/18/2018 4.61     Hemoglobin 04/18/2018 13.6     Hematocrit 04/18/2018 43.3     MCV 04/18/2018 94     MCH 04/18/2018 29.5     MCHC 04/18/2018 31.4*    RDW 04/18/2018 13.0     Platelets 04/18/2018 276     MPV 04/18/2018 9.8     Immature Granulocytes 04/18/2018 0.2     Gran # (ANC) 04/18/2018 2.4     Immature Grans (Abs) 04/18/2018 0.01     Lymph # 04/18/2018 2.4     Mono # 04/18/2018 0.6     Eos # 04/18/2018 0.1     Baso # 04/18/2018 0.04     nRBC 04/18/2018 0     Gran% 04/18/2018 42.9     Lymph% 04/18/2018 43.9     Mono% 04/18/2018 11.2     Eosinophil% 04/18/2018 1.1     Basophil% 04/18/2018 0.7     Differential Method 04/18/2018 Automated      Screen 04/18/2018 Negative <1:160     Rheumatoid Factor 04/18/2018 26.0*    Sed Rate 04/18/2018 3        Assessment:     1. Osteoporosis, unspecified osteoporosis type, unspecified pathological fracture presence     2. Elevated rheumatoid factor      I discussed with patient other options for treatment for osteoporosis.  At this point she can try other oral bisphosphonates that she did not try such as Atelvia or Actonel or IV bisphosphonates would run the risk of possible myalgias and arthralgias and also consider less efficacious drug treatment such as Miacalcin or of Evista.  Patient states she just wants to continue where she is right now and forego any further treatment and she says she just wants to remain following with her PCP for now.  She will try to increase her walking.  I  advised her however that she should continue her calcium and I recommended 1200 mg not to exceed 1500 mg daily between diet and oral supplements and that her vitamin D intake is adequate as her vitamin D levels have been good.  I advised her to get her repeat bone density that is due this year soon.  She states she will when she sees her PCP.      Should she have worsening joint pains considering her elevation of rheumatoid factor consider rheumatology referral    She can follow up with me as needed as she chooses  Plan:   Osteoporosis, unspecified osteoporosis type, unspecified pathological fracture presence    Elevated rheumatoid factor          No Follow-up on file.    Labs prior to appointment? not applicable     Disclaimer:  This note may have been partially prepared using voice recognition software and  it may have not been extensively proofed, as such there could be errors within the text such as sound alike errors.

## 2018-08-30 ENCOUNTER — LAB VISIT (OUTPATIENT)
Dept: LAB | Facility: HOSPITAL | Age: 83
End: 2018-08-30
Payer: MEDICARE

## 2018-08-30 DIAGNOSIS — E78.5 HYPERLIPIDEMIA, UNSPECIFIED HYPERLIPIDEMIA TYPE: Chronic | ICD-10-CM

## 2018-08-30 DIAGNOSIS — I10 ESSENTIAL HYPERTENSION: Chronic | ICD-10-CM

## 2018-08-30 LAB
ALBUMIN SERPL BCP-MCNC: 3.8 G/DL
ALP SERPL-CCNC: 65 U/L
ALT SERPL W/O P-5'-P-CCNC: 12 U/L
ANION GAP SERPL CALC-SCNC: 9 MMOL/L
AST SERPL-CCNC: 14 U/L
BILIRUB SERPL-MCNC: 0.7 MG/DL
BUN SERPL-MCNC: 19 MG/DL
CALCIUM SERPL-MCNC: 9.4 MG/DL
CHLORIDE SERPL-SCNC: 106 MMOL/L
CHOLEST SERPL-MCNC: 277 MG/DL
CHOLEST/HDLC SERPL: 3.9 {RATIO}
CO2 SERPL-SCNC: 26 MMOL/L
CREAT SERPL-MCNC: 0.7 MG/DL
EST. GFR  (AFRICAN AMERICAN): >60 ML/MIN/1.73 M^2
EST. GFR  (NON AFRICAN AMERICAN): >60 ML/MIN/1.73 M^2
GLUCOSE SERPL-MCNC: 86 MG/DL
HDLC SERPL-MCNC: 71 MG/DL
HDLC SERPL: 25.6 %
LDLC SERPL CALC-MCNC: 183.2 MG/DL
NONHDLC SERPL-MCNC: 206 MG/DL
POTASSIUM SERPL-SCNC: 4.3 MMOL/L
PROT SERPL-MCNC: 7.1 G/DL
SODIUM SERPL-SCNC: 141 MMOL/L
TRIGL SERPL-MCNC: 114 MG/DL

## 2018-08-30 PROCEDURE — 36415 COLL VENOUS BLD VENIPUNCTURE: CPT

## 2018-08-30 PROCEDURE — 80061 LIPID PANEL: CPT

## 2018-08-30 PROCEDURE — 80053 COMPREHEN METABOLIC PANEL: CPT

## 2018-08-31 RX ORDER — LOSARTAN POTASSIUM 50 MG/1
TABLET ORAL
Qty: 90 TABLET | Refills: 3 | Status: SHIPPED | OUTPATIENT
Start: 2018-08-31 | End: 2018-12-20 | Stop reason: SDUPTHER

## 2018-09-06 ENCOUNTER — OFFICE VISIT (OUTPATIENT)
Dept: INTERNAL MEDICINE | Facility: CLINIC | Age: 83
End: 2018-09-06
Payer: MEDICARE

## 2018-09-06 VITALS
OXYGEN SATURATION: 97 % | HEIGHT: 58 IN | SYSTOLIC BLOOD PRESSURE: 148 MMHG | DIASTOLIC BLOOD PRESSURE: 58 MMHG | WEIGHT: 129.63 LBS | TEMPERATURE: 97 F | HEART RATE: 76 BPM | BODY MASS INDEX: 27.21 KG/M2

## 2018-09-06 DIAGNOSIS — E78.5 HYPERLIPIDEMIA, UNSPECIFIED HYPERLIPIDEMIA TYPE: Chronic | ICD-10-CM

## 2018-09-06 DIAGNOSIS — Z00.00 ROUTINE HEALTH MAINTENANCE: ICD-10-CM

## 2018-09-06 DIAGNOSIS — Z78.9 STATIN INTOLERANCE: ICD-10-CM

## 2018-09-06 DIAGNOSIS — I10 ESSENTIAL HYPERTENSION: Primary | Chronic | ICD-10-CM

## 2018-09-06 PROCEDURE — 3077F SYST BP >= 140 MM HG: CPT | Mod: CPTII,,, | Performed by: FAMILY MEDICINE

## 2018-09-06 PROCEDURE — 99397 PER PM REEVAL EST PAT 65+ YR: CPT | Mod: S$PBB,,, | Performed by: FAMILY MEDICINE

## 2018-09-06 PROCEDURE — 99999 PR PBB SHADOW E&M-EST. PATIENT-LVL III: CPT | Mod: PBBFAC,,, | Performed by: FAMILY MEDICINE

## 2018-09-06 PROCEDURE — 99213 OFFICE O/P EST LOW 20 MIN: CPT | Mod: PBBFAC,PO | Performed by: FAMILY MEDICINE

## 2018-09-06 PROCEDURE — 3078F DIAST BP <80 MM HG: CPT | Mod: CPTII,,, | Performed by: FAMILY MEDICINE

## 2018-09-06 NOTE — PATIENT INSTRUCTIONS
Shingrix new shingles vaccine  via a pharmacy    Lifestyle Changes to Control Cholesterol  You can control your cholesterol through diet, exercise, weight management, quitting smoking, stress management, and taking your medicines right. These things can also lower your risk for cardiovascular disease.    Eating healthy  Your healthcare provider will give you information on diet changes you may need to make. Your provider may recommend that you see a registered dietitian for help with diet changes. Changes may include:  · Cutting back on the amount of fat and cholesterol in your meals  · Eating less salt (sodium). This is especially important if you have high blood pressure.  · Eating more fresh vegetables and fruits  · Eating lean proteins such as fish, poultry, beans, and peas  · Eating less red meat and processed meats  · Using low-fat dairy products  · Using vegetable and nut oils in limited amounts  · Limiting how many sweets and processed foods like chips, cookies, and baked goods that you eat   · Limiting how many sugar-sweetened beverages you drink  · Limiting how often you eat out  Getting exercise  Regular exercise is a good way to help your body control cholesterol. Regular exercise can help in many ways. It can:  · Raise your good cholesterol  · Help lower your bad cholesterol  · Let blood flow better through your body  · Give more oxygen to your muscles and tissues  · Help you manage your weight  · Help your heart pump better  · Lower your blood pressure  Your healthcare provider may recommend that you get more physical activity if you haven't been active. Your provider may recommend that you get moderate to vigorous physical activity for at least 40 minutes each day. You should do this for at least 3 to 4 days each week. A few examples of moderate to vigorous activity are:  · Walking at a brisk pace. This is about 3 to 4 miles per hour.  · Jogging or running  · Swimming or water  aerobics  · Hiking  · Dancing  · Martial arts  · Tennis  · Riding a bicycle or stationary bike  · Dancing  Managing your weight  If you are overweight or obese, your healthcare provider will work with you to help you lose weight and lower your BMI (body mass index). Making diet changes and getting more physical activity can help. Changing your diet will help you lose weight more easily than adding exercise.  Quitting smoking  Smoking and other tobacco use can raise cholesterol and make it harder to control. Quitting is tough. But millions of people have given up tobacco for good. You can quit, too! Think about some of the reasons below to quit smoking. Do any of them make you think twice about your smoking habit?  Stop smoking because it:  · Keeps your cholesterol high, even if you make all the other changes youre supposed to  · Damages your body. It especially harms your heart, lungs, skin, and blood vessels.  · Makes you more likely to have a heart attack (acute myocardial infarction), stroke, or cancer  · Stains your teeth  · Makes your skin, clothes, and breath smell bad  · Costs a lot of money  Controlling stress   Learn ways to control stress. This will help you deal with stress in your home and work life. Controlling stress can greatly lower your risk of getting cardiovascular disease.  Making the most of medicines  Healthy eating and exercise are a good start to keeping your cholesterol down. But you may need some extra help from medicine. If your doctor prescribes medicine, be sure to take it exactly as directed. Remember:  · Tell your healthcare provider about all other medicines you take. This includes vitamins and herbs.  · Tell your healthcare provider if you have any side effects after starting to take a medicine. Examples of side effects to watch for include muscle aches, weakness, blurred vision, rust-colored urine, yellowing of eyes or skin (jaundice), and headache.  · Dont skip a dose or stop  taking your medicine because you feel better or because your cholesterol numbers go down. Never stop taking your medicine unless your healthcare provider has told you its OK.  · Ask your healthcare provider if you have any questions about your medicines.  High risk groups  Some people may need to take medicines called statins to control their cholesterol. This is in addition to eating a healthy diet and getting regular exercise.  Statins can help you stay healthy. They can also help prevent a heart attack or stroke. You may need to take a statin if you are in one of these groups:  · Adults who have had a heart attack or stroke. Or adults who have had peripheral vascular disease, a ministroke (transient ischemic attack), or stable or unstable angina. This group also includes people who have had a procedure to restore blood flow through a blocked artery. These procedures include percutaneous coronary intervention, angioplasty, stent, and open-heart bypass surgery.  · Adults who have diabetes. Or adults who are at higher risk of having a heart attack or stroke and have an LDL cholesterol level of 70 to 189 mg/dL  · Adults who are 21 years old or older and have an LDL cholesterol level of 190 mg/dL or higher.  If you are in a high-risk group, talk with your healthcare provider about your treatment goals. Make sure you understand why these goals are important, based on your own health history and your family history of heart disease or high cholesterol.  Make a plan to have regular cholesterol checks. You may need to fast before getting this test. Also ask your provider about any side effects your medicines may cause. Let your provider know about any side effects you have. You may need to take more than one medicine to reach the cholesterol goals that you and your provider decide on.  Date Last Reviewed: 10/1/2016  © 8879-9013 [employer]. [address] [place] [address], [place] [address]. All rights reserved.  "This information is not intended as a substitute for professional medical care. Always follow your healthcare professional's instructions.        Controlling Your Cholesterol  Cholesterol is a waxy substance. It travels in your blood through the blood vessels. When you have high cholesterol, it builds up in the walls of the blood vessels. This makes the vessels narrower. Blood flow decreases. You are then at greater risk for having a heart attack or a stroke.  Good and bad cholesterol  Lipids are fats. Blood is mostly water. Fat and water don't mix. So our bodies need lipoproteins (lipids inside a protein shell) to carry the lipids. The protein shell carries its lipids through the bloodstream. There are two main kinds of lipoproteins:  · LDL (low-density lipoprotein) is known as "bad cholesterol." It mainly carries cholesterol. It delivers this cholesterol to body cells. Excess LDL cholesterol will build up in artery walls. This increases your risk for heart disease and stroke.  · HDL (high-density lipoprotein) is known as "good cholesterol." This protein shell collects excess cholesterol that LDLs have left behind on blood vessel walls. That's why high levels of HDL cholesterol can decrease your risk of heart disease and stroke.  Controlling cholesterol levels  Total cholesterol includes LDL and HDL cholesterol, as well as other fats in the bloodstream. If your total cholesterol is high, follow the steps below to help lower your total cholesterol level:  · Eat less unhealthy fat:  ¨ Cut back on saturated fats and trans (also called hydrogenated) fats by selecting lean cuts of meat, low-fat dairy, and using oils instead of solid fats. Limit baked goods, processed meats, and fried foods. A diet thats high in these fats increases your bad cholesterol. It's not enough to just cut back on foods containing cholesterol.  ¨ Eat about 2 servings of fish per week. Most fish contain omega-3 fatty acids. These help lower " blood cholesterol.  ¨ Eat more whole grains and soluble fiber (such as oat bran). These lower overall cholesterol.  · Be active:  ¨ Choose an activity you enjoy. Walking, swimming, and riding a bike are some good ways to be active.  ¨ Start at a level where you feel comfortable. Increase your time and pace a little each week.  ¨ Work up to 40 minutes of moderate to high intensity physical activity at least 3 to 4 days per week.  ¨ Remember, some activity is better than none.  ¨ If you haven't been exercising regularly, start slowly. Check with your doctor to make sure the exercise plan is right for you.  · Quit smoking. Quitting smoking can improve your lipid levels. It also lowers your risk for heart disease and stroke.  · Weight management. If you are overweight or obese, your health care provider will work with you to lose weight and lower your BMI (body mass index) to a normal or near-normal level. Making diet changes and increasing physical activity can help.  · Take medication as directed. Many people need medication to get their LDL levels to a safe level. Medication to lower cholesterol levels is effective and safe. (But taking medication is not a substitute for exercise or watching your diet!) Your doctor can tell you whether you might benefit from a cholesterol-lowering medication.  Date Last Reviewed: 5/11/2015 © 2000-2017 [employer]. [address] [place] [address], [place] [address]. All rights reserved. This information is not intended as a substitute for professional medical care. Always follow your healthcare professional's instructions.        Low-Fat Cooking Tips  To eat less fat, you may need to learn some new ways to cook. But that doesn't mean you have to eat bland, boring food. And it doesn't mean cooking needs to take any more time. Here are some tips for cooking and seasoning foods with less fat.     Broil fish instead of frying it. And sprinkle on herbs to add flavor.    Try New  Cooking Methods  · Broil, roast, bake, steam, or microwave fish, chicken, turkey, and meat.  · Remove skin from chicken and turkey and trim extra fat from meat before cooking.  · Sprinkle herbs on meat, chicken, and fish, and in soups.  · Cook in broth instead of fat.  · Use nonstick cooking sprays or nonstick pans.  · Steam or microwave vegetables without adding fat. Serve with herbs, lemon juice, vinegar, or fat-free butter-flavored powder.  · To flavor beans and rice, add chopped onions, garlic, and peppers.  · Chill soups and stews. Before reheating and serving, skim off the fat.  · When you add fat, use canola or olive oil instead of butter or lard.  Lighten Up Your Recipes  · In soups and sauces: Replace whole milk or cream with low-fat milk, evaporated fat-free milk, or nonfat dry milk.  · In puddings and other desserts: Replace whole milk or cream with low-fat milk or fat-free condensed milk.  · To make dips and toppings: Use low-fat or nonfat cottage cheese or sour cream.  · To make salad dressings: Use nonfat yogurt or low-fat buttermilk.  · In place of 1 whole egg in recipes: Use 2 egg whites or 1/4 cup egg substitute.  · In place of regular cheese: Use fat-free or reduced-fat cheese.  Date Last Reviewed: 5/11/2015  © 8476-0304 [employer]. [address] [place] [address], [place] [address]. All rights reserved. This information is not intended as a substitute for professional medical care. Always follow your healthcare professional's instructions.        Quick, Healthy Ways to Cook  Here are some tips for quick and nutritious food preparation. These methods will save you time and help to cut down on fat.  Stir-frying  If you dont have a wok, use a cast-iron or non-stick skillet. Most dishes can be cooked with just a tablespoon of oil if you heat the pan first. Buy pre-cut vegetables to reduce preparation time. Try stir-frying sliced lean beef or boneless, skinless chicken and ready-cut broccoli.  Add a dash of soy sauce and some slices of fresh david root.  Microwaving  Microwaves cook very quickly. So most of the nutrients in the foods youre cooking dont have time to escape. Read the cooking directions carefully. Its easy to overcook foods. Use the microwave to cook baked potatoes or winter squash. You can also reheat leftovers and soup. Fish filets can be microwaved in minutes. Just add seasoning and a dash of milk. Then cover with wax paper and cook.  Crock pots  This handy kitchen appliance cooks food slowly at low temperatures. Set it up in the morning. Dinner will be ready and waiting for you when you get home. Soups, stews, and pot roasts all make great crock pot meals. Be sure to trim fat from meat before cooking. Extra-lean, less marbled cuts of meat become tender and juicy when cooked in a crock pot. Add more flavor by using different types of canned tomatoes, herbs, and spices.  Baking, broiling, and grilling  Bake, broil, or grill foods on a rack to drain fats away during cooking. This is a healthier way to eat. And its delicious as well. [place] meat and vegetables, too. Add bell peppers, tomatoes, onions, and mushrooms to kebobs. Or put vegetables in foil-wrapped packets.  Steaming  Steaming can be done in a microwave or on the stove top. Either way, it keeps in nutrients and flavor without adding fat. Ready-cut broccoli, cauliflower, and baby carrots can go right from the bag to the steamer.  Pressure cooking  By using steam, pressure cookers can cook a pound of potatoes in just 4 minutes. Or a chicken stew in less than half an hour. A pressure cooker can also turn the toughest cut of meat into a tender main course. Dont over-season foods. Pressure cooking uses very little liquid, so flavors are stronger.  Poaching  In poaching, the food is covered with liquid. This could be broth, milk, or wine. The food is then gently simmered until done. Poaching uses less liquid than steaming or  boiling. This means that delicate flavors are less diluted. Poaching works well for fish or eggs.  Date Last Reviewed: 6/8/2015  © 3781-2874 [employer]. [address] [place] [address], [place] [address]. All rights reserved. This information is not intended as a substitute for professional medical care. Always follow your healthcare professional's instructions.

## 2018-09-06 NOTE — PROGRESS NOTES
Subjective:       Patient ID: Isamar Roe is a. female.    Chief Complaint: here for physical examination and issues  below    HPI Hypertension: blood pressures typ normal. Tolerating medicine.   hyperchol on niacin d/wd lowering chol\ consider asa;poor diet. Intol statins  osteopor intol bisphos saw endocr. tx deferred      Past Medical History:   Diagnosis Date    Arthritis     hands    Benign hematuria     [place]    Calcific tendinitis of left shoulder 1/27/2016    Cataract     Diverticulosis     colonoscopy 5/22/2012    Duodenal diverticulum     EGD 5/22/2012    GERD (gastroesophageal reflux disease)     Hemorrhoids     colonoscopy 5/22/2012    History of skin cancer     per patient s/p excision x2 face, no chemo or radiation, sees outside derm, Dr. Tinsley.    Hyperlipidemia     Hypertension     Impingement syndrome of left shoulder 1/27/2016    Osteoporosis     declines tx;11/17/16 phone note    Pneumonia     as a child    Sciatica     [place]; dr castillo    Skin cancer     Dr. Noriega    Vitamin D deficiency disease      Past Surgical History:   Procedure Laterality Date    APPENDECTOMY      CARPAL TUNNEL RELEASE Bilateral     CHOLECYSTECTOMY      HYSTERECTOMY      joint removal in right thumb      SKIN CANCER EXCISION      x2    SPINE SURGERY  9/16/14    L4/5 laminectomy;dr sagastume    TOE SURGERY      [facility]     Family History   Problem Relation Age of Onset    Heart disease Mother     Hypertension Mother     Cataracts Mother     Heart disease Father     Hypertension Father     Cataracts Father     Heart disease Brother 45    Heart disease Sister      A-fib    Heart disease Sister      heart bypass x 5    Kidney disease Brother 70    Heart disease Brother     Cancer Maternal Aunt      breast    Diabetes Maternal Grandmother     Diabetes Maternal Aunt     Hypertension Other      multiple family members    Stroke Neg Hx     Melanoma Neg Hx      Social History     Social  History    Marital status:      Spouse name: AMBREEN    Number of children: 4    Years of education: N/A     Social History Main Topics    Smoking status: Never Smoker    Smokeless tobacco: Never Used    Alcohol use No    Drug use: No    Sexual activity: Yes     Partners: Male     Other Topics Concern    Are You Pregnant Or Think You May Be? No    Breast-Feeding No     Social History Narrative    None       Review of Systems  Cardiovascular: no chest pain  Chest: no shortness of breath  Abd: no abd pain  Remainder review of systems negative    Objective:      Physical Exam   Constitutional: She is oriented to person, place, and time. She appears well-developed and well-nourished. No distress.   HENT:   Head: Atraumatic.   Right Ear: External ear normal.   Left Ear: External ear normal.   Nose: Nose normal.   Mouth/Throat: Oropharynx is clear and moist. No oropharyngeal exudate.   bilat tms nl   Eyes: Conjunctivae and EOM are normal. Pupils are equal, round, and reactive to light. No scleral icterus.   Neck: Normal range of motion. Neck supple. No thyromegaly present.   Cardiovascular: Normal rate, regular rhythm and normal heart sounds.    No murmur heard.  Pulmonary/Chest: Effort normal and breath sounds normal. No respiratory distress. She has no wheezes. She has no rales.   Abdominal: Soft. Bowel sounds are normal. She exhibits no distension and no mass. There is no hepatosplenomegaly. There is no tenderness. There is no rebound and no guarding.   Musculoskeletal: Normal range of motion. She exhibits no edema or tenderness.   Lymphadenopathy:     She has no cervical adenopathy.   Neurological: She is alert and oriented to person, place, and time. No cranial nerve deficit. She exhibits normal muscle tone. Coordination normal.   Skin: Skin is warm. No rash noted. No erythema. No pallor.   Psychiatric: She has a normal mood and affect. Her behavior is normal. Judgment and thought content normal.    Nursing note and vitals reviewed.      Assessment:       1. Routine health maintenance    2. Essential hypertension    3. Osteoporosis, unspecified osteoporosis type, unspecified pathological fracture presence    4. Hyperlipidemia, unspecified hyperlipidemia type        Plan:       **  lowchol diet   Lab 12 months and follow up after  Shingrix new shingles vaccine  via a pharmacy    Nurse bp check one month;bring home bps  Chol handout    D/wd zetia but she declines for now    Essential hypertension  -     Basic metabolic panel; Future; Expected date: 09/06/2019  -     Lipid panel; Future; Expected date: 09/06/2019    Hyperlipidemia, unspecified hyperlipidemia type    Routine health maintenance    Statin intolerance    add: 6 weeks am pm home bps large majority upper 130s. Some 140smost elev ones in pm  Will recomm inc verap and f/u rubio one month w home bps

## 2018-10-24 RX ORDER — VERAPAMIL HYDROCHLORIDE 120 MG/1
CAPSULE, EXTENDED RELEASE ORAL
Qty: 90 CAPSULE | Refills: 3 | Status: SHIPPED | OUTPATIENT
Start: 2018-10-24 | End: 2018-11-01

## 2018-11-01 ENCOUNTER — TELEPHONE (OUTPATIENT)
Dept: INTERNAL MEDICINE | Facility: CLINIC | Age: 83
End: 2018-11-01

## 2018-11-01 RX ORDER — VERAPAMIL HYDROCHLORIDE 180 MG/1
180 CAPSULE, EXTENDED RELEASE ORAL DAILY
Qty: 90 CAPSULE | Refills: 3 | Status: SHIPPED | OUTPATIENT
Start: 2018-11-01 | End: 2019-01-21

## 2018-11-01 NOTE — TELEPHONE ENCOUNTER
Spoke with patient advised her bp's were up, dosage increase on blood pressure medications, follow up appointment scheduled, patient voices understanding

## 2018-11-01 NOTE — TELEPHONE ENCOUNTER
Please let her know I reviewed home bps. Most upper nl but many elev  Recommend inc verapamil to 180 mg . erxd changed rx  F/u rubio few weeks with home bps

## 2018-11-15 DIAGNOSIS — N95.9 MENOPAUSAL AND POSTMENOPAUSAL DISORDER: ICD-10-CM

## 2018-11-16 RX ORDER — ESTRADIOL 0.5 MG/1
TABLET ORAL
Qty: 90 TABLET | Refills: 3 | Status: SHIPPED | OUTPATIENT
Start: 2018-11-16 | End: 2018-12-20

## 2018-11-19 ENCOUNTER — OFFICE VISIT (OUTPATIENT)
Dept: INTERNAL MEDICINE | Facility: CLINIC | Age: 83
End: 2018-11-19
Payer: MEDICARE

## 2018-11-19 VITALS
DIASTOLIC BLOOD PRESSURE: 70 MMHG | HEIGHT: 58 IN | BODY MASS INDEX: 27.76 KG/M2 | TEMPERATURE: 97 F | SYSTOLIC BLOOD PRESSURE: 140 MMHG | OXYGEN SATURATION: 97 % | WEIGHT: 132.25 LBS | HEART RATE: 67 BPM

## 2018-11-19 DIAGNOSIS — I10 ESSENTIAL HYPERTENSION: Chronic | ICD-10-CM

## 2018-11-19 DIAGNOSIS — Z09 FOLLOW UP: Primary | ICD-10-CM

## 2018-11-19 DIAGNOSIS — R19.7 DIARRHEA, UNSPECIFIED TYPE: ICD-10-CM

## 2018-11-19 DIAGNOSIS — R14.3 FLATULENCE: ICD-10-CM

## 2018-11-19 PROCEDURE — 1101F PT FALLS ASSESS-DOCD LE1/YR: CPT | Mod: CPTII,HCNC,S$GLB, | Performed by: NURSE PRACTITIONER

## 2018-11-19 PROCEDURE — 99999 PR PBB SHADOW E&M-EST. PATIENT-LVL IV: CPT | Mod: PBBFAC,HCNC,, | Performed by: NURSE PRACTITIONER

## 2018-11-19 PROCEDURE — 3077F SYST BP >= 140 MM HG: CPT | Mod: CPTII,HCNC,S$GLB, | Performed by: NURSE PRACTITIONER

## 2018-11-19 PROCEDURE — 99214 OFFICE O/P EST MOD 30 MIN: CPT | Mod: HCNC,S$GLB,, | Performed by: NURSE PRACTITIONER

## 2018-11-19 PROCEDURE — 3078F DIAST BP <80 MM HG: CPT | Mod: CPTII,HCNC,S$GLB, | Performed by: NURSE PRACTITIONER

## 2018-11-19 RX ORDER — LOSARTAN POTASSIUM 25 MG/1
25 TABLET ORAL DAILY
Qty: 30 TABLET | Refills: 0 | Status: SHIPPED | OUTPATIENT
Start: 2018-11-19 | End: 2018-12-20 | Stop reason: SDUPTHER

## 2018-11-19 NOTE — PROGRESS NOTES
Subjective:       Patient ID: Isamar Roe is a 83 y.o. female.    Chief Complaint: Hypertension (f/u rechk )    Patient presents for elevated blood pressure.  Has been checking blood pressure at home: 120-150/61-81.  Reports some diarrhea.  Verapamil was increased.  Continues to have diarrhea and flatulence.  Reports diarrhea started a few months ago.  Can not relate to diet. Started Benefiber.       Review of Systems   Constitutional: Negative for chills and fatigue.   Respiratory: Negative for shortness of breath.    Gastrointestinal: Positive for diarrhea.   Musculoskeletal: Negative for arthralgias and gait problem.   Skin: Negative for color change and rash.   Neurological: Negative for dizziness and headaches.   Psychiatric/Behavioral: Negative for agitation and confusion.       Objective:      Physical Exam   Constitutional: She is oriented to person, place, and time. Vital signs are normal. She appears well-developed and well-nourished.   HENT:   Head: Normocephalic and atraumatic.   Neck: Normal range of motion.   Cardiovascular: Normal rate and regular rhythm.   Pulmonary/Chest: Effort normal and breath sounds normal.   Abdominal: Soft. Bowel sounds are normal.   Musculoskeletal: Normal range of motion.   Neurological: She is alert and oriented to person, place, and time.   Skin: Skin is warm.   Psychiatric: She has a normal mood and affect. Her behavior is normal.       Assessment:       1. Follow up    2. Essential hypertension    3. Diarrhea, unspecified type    4. Flatulence        Plan:         Follow up    Essential hypertension  -     losartan (COZAAR) 25 MG tablet; Take 1 tablet (25 mg total) by mouth once daily. Take with the 50 mg of losartan to equal 75 mg.  Dispense: 30 tablet; Refill: 0    Diarrhea, unspecified type  Comments:  Start probiotics.   Orders:  -     Stool Exam-Ova,Cysts,Parasites; Future; Expected date: 11/19/2018  -     WBC, Stool; Future; Expected date: 11/19/2018  -     Stool  culture; Future; Expected date: 11/19/2018  -     Clostridium difficile EIA; Future; Expected date: 11/19/2018  -     Giardia / Cryptosporidum, EIA; Future; Expected date: 11/19/2018  -     Lactobac. rhamnosus GG-inulin (CULTURELLE PROBIOTICS) 10 billion cell -200 mg CpSP; Take 1 capsule by mouth once daily. for 10 days    Flatulence  -     Stool Exam-Ova,Cysts,Parasites; Future; Expected date: 11/19/2018  -     WBC, Stool; Future; Expected date: 11/19/2018  -     Stool culture; Future; Expected date: 11/19/2018  -     Clostridium difficile EIA; Future; Expected date: 11/19/2018  -     Giardia / Cryptosporidum, EIA; Future; Expected date: 11/19/2018  -     Lactobac. rhamnosus GG-inulin (CULTURELLE PROBIOTICS) 10 billion cell -200 mg CpSP; Take 1 capsule by mouth once daily. for 10 days       Adding 25 mg losartan to the 50 mg= 75 mg daily (taking at bedtime).  Continue to monitor blood pressure at home.  Collect diarrhea at home and bring into lab.  Will schedule 1 month ago.

## 2018-11-30 ENCOUNTER — LAB VISIT (OUTPATIENT)
Dept: LAB | Facility: HOSPITAL | Age: 83
End: 2018-11-30
Attending: NURSE PRACTITIONER
Payer: MEDICARE

## 2018-11-30 DIAGNOSIS — R14.3 FLATULENCE: ICD-10-CM

## 2018-11-30 DIAGNOSIS — R19.7 DIARRHEA, UNSPECIFIED TYPE: ICD-10-CM

## 2018-11-30 PROCEDURE — 87209 SMEAR COMPLEX STAIN: CPT | Mod: HCNC

## 2018-11-30 PROCEDURE — 87328 CRYPTOSPORIDIUM AG IA: CPT | Mod: HCNC

## 2018-11-30 PROCEDURE — 87324 CLOSTRIDIUM AG IA: CPT | Mod: HCNC

## 2018-11-30 PROCEDURE — 89055 LEUKOCYTE ASSESSMENT FECAL: CPT | Mod: HCNC

## 2018-11-30 PROCEDURE — 87045 FECES CULTURE AEROBIC BACT: CPT | Mod: HCNC

## 2018-11-30 PROCEDURE — 87427 SHIGA-LIKE TOXIN AG IA: CPT | Mod: HCNC

## 2018-11-30 PROCEDURE — 87046 STOOL CULTR AEROBIC BACT EA: CPT | Mod: HCNC

## 2018-11-30 PROCEDURE — 87329 GIARDIA AG IA: CPT | Mod: HCNC

## 2018-12-01 LAB
C DIFF GDH STL QL: NEGATIVE
C DIFF TOX A+B STL QL IA: NEGATIVE
WBC #/AREA STL HPF: NORMAL /[HPF]

## 2018-12-02 LAB
CRYPTOSP AG STL QL IA: NEGATIVE
G LAMBLIA AG STL QL IA: NEGATIVE

## 2018-12-03 LAB
E COLI SXT1 STL QL IA: NEGATIVE
E COLI SXT2 STL QL IA: NEGATIVE
O+P STL TRI STN: NORMAL

## 2018-12-04 LAB — BACTERIA STL CULT: NORMAL

## 2018-12-20 ENCOUNTER — OFFICE VISIT (OUTPATIENT)
Dept: INTERNAL MEDICINE | Facility: CLINIC | Age: 83
End: 2018-12-20
Payer: MEDICARE

## 2018-12-20 ENCOUNTER — TELEPHONE (OUTPATIENT)
Dept: INTERNAL MEDICINE | Facility: CLINIC | Age: 83
End: 2018-12-20

## 2018-12-20 VITALS
BODY MASS INDEX: 27.56 KG/M2 | TEMPERATURE: 97 F | OXYGEN SATURATION: 97 % | WEIGHT: 131.31 LBS | HEIGHT: 58 IN | HEART RATE: 74 BPM | SYSTOLIC BLOOD PRESSURE: 138 MMHG | DIASTOLIC BLOOD PRESSURE: 74 MMHG

## 2018-12-20 DIAGNOSIS — I10 ESSENTIAL HYPERTENSION: Chronic | ICD-10-CM

## 2018-12-20 DIAGNOSIS — Z09 FOLLOW UP: Primary | ICD-10-CM

## 2018-12-20 DIAGNOSIS — M81.0 OSTEOPOROSIS, UNSPECIFIED OSTEOPOROSIS TYPE, UNSPECIFIED PATHOLOGICAL FRACTURE PRESENCE: Chronic | ICD-10-CM

## 2018-12-20 DIAGNOSIS — I10 ESSENTIAL HYPERTENSION: ICD-10-CM

## 2018-12-20 DIAGNOSIS — R19.4 BOWEL HABIT CHANGES: ICD-10-CM

## 2018-12-20 PROCEDURE — G0008 ADMIN INFLUENZA VIRUS VAC: HCPCS | Mod: HCNC,S$GLB,, | Performed by: NURSE PRACTITIONER

## 2018-12-20 PROCEDURE — 99213 OFFICE O/P EST LOW 20 MIN: CPT | Mod: HCNC,25,S$GLB, | Performed by: NURSE PRACTITIONER

## 2018-12-20 PROCEDURE — 3078F DIAST BP <80 MM HG: CPT | Mod: CPTII,HCNC,S$GLB, | Performed by: NURSE PRACTITIONER

## 2018-12-20 PROCEDURE — 1101F PT FALLS ASSESS-DOCD LE1/YR: CPT | Mod: CPTII,HCNC,S$GLB, | Performed by: NURSE PRACTITIONER

## 2018-12-20 PROCEDURE — 90662 IIV NO PRSV INCREASED AG IM: CPT | Mod: HCNC,59,S$GLB, | Performed by: NURSE PRACTITIONER

## 2018-12-20 PROCEDURE — 3075F SYST BP GE 130 - 139MM HG: CPT | Mod: CPTII,HCNC,S$GLB, | Performed by: NURSE PRACTITIONER

## 2018-12-20 PROCEDURE — 99999 PR PBB SHADOW E&M-EST. PATIENT-LVL IV: CPT | Mod: PBBFAC,HCNC,, | Performed by: NURSE PRACTITIONER

## 2018-12-20 RX ORDER — LOSARTAN POTASSIUM 25 MG/1
25 TABLET ORAL DAILY
Qty: 90 TABLET | Refills: 3 | Status: SHIPPED | OUTPATIENT
Start: 2018-12-20 | End: 2019-01-21

## 2018-12-20 RX ORDER — LOSARTAN POTASSIUM 50 MG/1
50 TABLET ORAL DAILY
Qty: 90 TABLET | Refills: 3 | Status: SHIPPED | OUTPATIENT
Start: 2018-12-20 | End: 2019-01-21

## 2018-12-20 NOTE — PROGRESS NOTES
Subjective:       Patient ID: Isamar Roe is a 83 y.o. female.    Chief Complaint: Follow-up (1 mo)    Patient presents for a follow up with diarrhea.  Reports probiotics helped.  Stool consistency is better. Stool testing negative. Has home blood pressure readings.  Very few 140's systolic.  Taking Losartan 75 mg daily.       Review of Systems   Constitutional: Negative for chills and fatigue.   Respiratory: Negative for cough and shortness of breath.    Gastrointestinal: Negative for abdominal pain, constipation and diarrhea.   Musculoskeletal: Negative for arthralgias and gait problem.   Psychiatric/Behavioral: Negative for agitation and confusion.       Objective:      Physical Exam   Constitutional: She is oriented to person, place, and time. Vital signs are normal. She appears well-developed and well-nourished.   HENT:   Head: Normocephalic and atraumatic.   Neck: Normal range of motion.   Cardiovascular: Normal rate and regular rhythm.   Pulmonary/Chest: Effort normal and breath sounds normal.   Musculoskeletal: Normal range of motion.   Neurological: She is alert and oriented to person, place, and time.   Skin: Skin is warm.   Psychiatric: She has a normal mood and affect. Her behavior is normal.       Assessment:       1. Follow up    2. Bowel habit changes (improved)    3. Essential hypertension    4. Osteoporosis, unspecified osteoporosis type, unspecified pathological fracture presence    5. Essential hypertension        Plan:         Follow up    Bowel habit changes (improved)  Comments:  Continue probiotics.     Essential hypertension  Comments:  Continue to monitor blood pressure.  Watch sodium/caffeine.    Orders:  -     losartan (COZAAR) 50 MG tablet; Take 1 tablet (50 mg total) by mouth once daily.  Dispense: 90 tablet; Refill: 3  -     losartan (COZAAR) 25 MG tablet; Take 1 tablet (25 mg total) by mouth once daily. Take with the 50 mg of losartan to equal 75 mg.  Dispense: 90 tablet; Refill:  3    Osteoporosis, unspecified osteoporosis type, unspecified pathological fracture presence  -     DXA Bone Density Spine And Hip; Future; Expected date: 12/20/2018    Essential hypertension  -     losartan (COZAAR) 50 MG tablet; Take 1 tablet (50 mg total) by mouth once daily.  Dispense: 90 tablet; Refill: 3  -     losartan (COZAAR) 25 MG tablet; Take 1 tablet (25 mg total) by mouth once daily. Take with the 50 mg of losartan to equal 75 mg.  Dispense: 90 tablet; Refill: 3    Other orders  -     Influenza - High Dose (65+) (PF) (IM)        Blood pressure normal today. Continue to monitor blood pressure at home.  Continue current medications.  Schedule DEXA 2019.  Flu shot today.  Follow up as scheduled.

## 2018-12-24 ENCOUNTER — OFFICE VISIT (OUTPATIENT)
Dept: URGENT CARE | Facility: CLINIC | Age: 83
End: 2018-12-24
Payer: MEDICARE

## 2018-12-24 VITALS
WEIGHT: 130.94 LBS | DIASTOLIC BLOOD PRESSURE: 70 MMHG | HEART RATE: 76 BPM | SYSTOLIC BLOOD PRESSURE: 130 MMHG | OXYGEN SATURATION: 98 % | BODY MASS INDEX: 27.48 KG/M2 | TEMPERATURE: 98 F | HEIGHT: 58 IN | RESPIRATION RATE: 16 BRPM

## 2018-12-24 DIAGNOSIS — J02.9 ACUTE PHARYNGITIS, UNSPECIFIED ETIOLOGY: Primary | ICD-10-CM

## 2018-12-24 LAB
CTP QC/QA: YES
S PYO RRNA THROAT QL PROBE: NEGATIVE

## 2018-12-24 PROCEDURE — 99214 OFFICE O/P EST MOD 30 MIN: CPT | Mod: HCNC,S$GLB,, | Performed by: NURSE PRACTITIONER

## 2018-12-24 PROCEDURE — 3075F SYST BP GE 130 - 139MM HG: CPT | Mod: CPTII,HCNC,S$GLB, | Performed by: NURSE PRACTITIONER

## 2018-12-24 PROCEDURE — 3078F DIAST BP <80 MM HG: CPT | Mod: CPTII,HCNC,S$GLB, | Performed by: NURSE PRACTITIONER

## 2018-12-24 PROCEDURE — 1101F PT FALLS ASSESS-DOCD LE1/YR: CPT | Mod: CPTII,HCNC,S$GLB, | Performed by: NURSE PRACTITIONER

## 2018-12-24 PROCEDURE — 87081 CULTURE SCREEN ONLY: CPT | Mod: HCNC

## 2018-12-24 PROCEDURE — 87880 STREP A ASSAY W/OPTIC: CPT | Mod: QW,HCNC,S$GLB, | Performed by: NURSE PRACTITIONER

## 2018-12-24 PROCEDURE — 99999 PR PBB SHADOW E&M-EST. PATIENT-LVL IV: CPT | Mod: PBBFAC,HCNC,, | Performed by: NURSE PRACTITIONER

## 2018-12-24 RX ORDER — FLUTICASONE PROPIONATE 50 MCG
1 SPRAY, SUSPENSION (ML) NASAL DAILY
Qty: 1 BOTTLE | Refills: 0 | Status: SHIPPED | OUTPATIENT
Start: 2018-12-24 | End: 2019-01-03

## 2018-12-24 RX ORDER — LORATADINE 10 MG/1
10 TABLET ORAL DAILY
Refills: 0 | COMMUNITY
Start: 2018-12-24 | End: 2019-05-13

## 2018-12-24 NOTE — PROGRESS NOTES
Subjective:      Patient ID: Isamar Roe is a 83 y.o. female.    Chief Complaint: Sore Throat and Generalized Body Aches      Sore Throat    This is a new problem. The current episode started today. The problem has been unchanged. Neither side of throat is experiencing more pain than the other. There has been no fever. The pain is at a severity of 2/10. The pain is mild. Pertinent negatives include no abdominal pain, congestion, coughing, diarrhea, drooling, ear discharge, ear pain, headaches, hoarse voice, plugged ear sensation, neck pain, shortness of breath, trouble swallowing or vomiting. She has had no exposure to strep or mono. She has tried nothing for the symptoms.     Review of Systems   Constitutional: Negative.  Negative for activity change, appetite change, chills, diaphoresis, fatigue and fever.   HENT: Positive for sore throat. Negative for congestion, drooling, ear discharge, ear pain, hoarse voice, postnasal drip, rhinorrhea, sinus pain, sneezing, trouble swallowing and voice change.    Eyes: Negative.  Negative for pain, discharge, redness and itching.   Respiratory: Negative.  Negative for cough, chest tightness and shortness of breath.    Cardiovascular: Negative.  Negative for chest pain and palpitations.   Gastrointestinal: Negative for abdominal pain, diarrhea, nausea and vomiting.   Endocrine: Negative.  Negative for cold intolerance and heat intolerance.   Genitourinary: Negative.  Negative for difficulty urinating and dysuria.   Musculoskeletal: Negative.  Negative for myalgias and neck pain.   Skin: Negative.  Negative for rash.   Allergic/Immunologic: Negative.  Negative for environmental allergies and food allergies.   Neurological: Negative.  Negative for dizziness, weakness, light-headedness and headaches.   Hematological: Negative.  Negative for adenopathy.   Psychiatric/Behavioral: Negative.  Negative for agitation.        Objective:     Vitals:    12/24/18 1241   BP: 130/70    Pulse: 76   Resp: 16   Temp: 98.1 °F (36.7 °C)     Physical Exam   Constitutional: She is oriented to person, place, and time. Vital signs are normal. She appears well-developed and well-nourished. She is cooperative. She does not appear ill. No distress.   HENT:   Head: Normocephalic.   Right Ear: Hearing, external ear and ear canal normal.   Left Ear: Hearing, external ear and ear canal normal.   Nose: Mucosal edema present. Right sinus exhibits no maxillary sinus tenderness and no frontal sinus tenderness. Left sinus exhibits no maxillary sinus tenderness and no frontal sinus tenderness.   Mouth/Throat: Uvula is midline and mucous membranes are normal. No oral lesions. No uvula swelling. Posterior oropharyngeal edema (mild) and posterior oropharyngeal erythema (mild) present. No oropharyngeal exudate or tonsillar abscesses.   Bilateral TM dull in appearance   Eyes: Conjunctivae, EOM and lids are normal. Pupils are equal, round, and reactive to light. Right eye exhibits no discharge. Left eye exhibits no discharge.   Neck: Normal range of motion and full passive range of motion without pain. Neck supple.   Cardiovascular: Normal rate, regular rhythm, S1 normal, S2 normal and normal heart sounds.   Pulmonary/Chest: Effort normal and breath sounds normal. No accessory muscle usage. No tachypnea. No respiratory distress. She exhibits no tenderness.   Lymphadenopathy:        Head (right side): No tonsillar adenopathy present.        Head (left side): No tonsillar adenopathy present.     She has no cervical adenopathy.   Neurological: She is alert and oriented to person, place, and time.   Skin: Skin is warm, dry and intact. Capillary refill takes less than 2 seconds. No rash noted.   Nursing note and vitals reviewed.      Assessment:     1. Acute pharyngitis, unspecified etiology        Plan:     Isamar was seen today for sore throat and generalized body aches.    Diagnoses and all orders for this visit:    Acute  pharyngitis, unspecified etiology  -     POCT Rapid Strep A  -     Strep A culture, throat    Other orders  -     dextromethorphan-guaifenesin (CORICIDIN HBP)  mg Cap; Take 1 capsule by mouth 2 (two) times daily. for 10 days  -     fluticasone (FLONASE) 50 mcg/actuation nasal spray; 1 spray (50 mcg total) by Each Nare route once daily. for 10 days  -     loratadine (CLARITIN) 10 mg tablet; Take 1 tablet (10 mg total) by mouth once daily.    Sinus/Allergy Symptoms    - Attempt to avoid allergens.  - Use Normal saline nasal spray to wash offending allergens from airways.  - Take antihistamine as prescribed such as Allegra, Zyrtec, Clartin.   - Take Plain Coricidin HBP as directed.   - Drink plenty of fluids.  - Follow up with Primary Care Provider in 1-2 weeks or sooner if needed.               In house Strep was negative today, Strep culture results in 3 days, will call, reports understanding             Go to ER immediately if severe allergic response experienced such as difficulty breathing, facial swelling, throat swelling.      MAGNOLIA Sarabia, FNP-C

## 2018-12-24 NOTE — PATIENT INSTRUCTIONS
When You Have a Sore Throat    A sore throat can be painful. There are many reasons why you may have a sore throat. Your healthcare provider will work with you to find the cause of your sore throat. He or she will also find the best treatment for you.  What causes a sore throat?  Sore throats can be caused or worsened by:  · Cold or flu viruses  · Bacteria  · Irritants such as tobacco smoke or air pollution  · Acid reflux  A healthy throat  The tonsils are on the sides of the throat near the base of the tongue. They collect viruses and bacteria and help fight infection. The throat (pharynx) is the passage for air. Mucus from the nasal cavity also moves down the passage.  An inflamed throat  The tonsils and pharynx can become inflamed due to a cold or flu virus. Postnasal drip (excess mucus draining from the nasal cavity) can irritate the throat. It can also make the throat or tonsils more likely to be infected by bacteria. Severe, untreated tonsillitis in children or adults can cause a pocket of pus (abscess) to form near the tonsil.  Your evaluation  A medical evaluation can help find the cause of your sore throat. It can also help your healthcare provider choose the best treatment for you. The evaluation may include a health history, physical exam, and diagnostic tests.  Health history  Your healthcare provider may ask you the following:  · How long has the sore throat lasted and how have you been treating it?  · Do you have any other symptoms, such as body aches, fever, or cough?  · Does your sore throat recur? If so, how often? How many days of school or work have you missed because of a sore throat?  · Do you have trouble eating or swallowing?  · Have you been told that you snore or have other sleep problems?  · Do you have bad breath?  · Do you cough up bad-tasting mucus?  Physical exam  During the exam, your healthcare provider checks your ears, nose, and throat for problems. He or she also checks for  "swelling in the neck, and may listen to your chest.  Possible tests  Other tests your healthcare provider may perform include:  · A throat swab to check for bacteria such as streptococcus (the bacteria that causes strep throat)  · A blood test to check for mononucleosis (a viral infection)  · A chest X-ray to rule out pneumonia, especially if you have a cough  Treating a sore throat  Treatment depends on many factors. What is the likely cause? Is the problem recent? Does it keep coming back? In many cases, the best thing to do is to treat the symptoms, rest, and let the problem heal itself. Antibiotics may help clear up some bacterial infections. For cases of severe or recurring tonsillitis, the tonsils may need to be removed.  Relieving your symptoms  · Dont smoke, and avoid secondhand smoke.  · For children, try throat sprays or Popsicles. Adults and older children may try lozenges.  · Drink warm liquids to soothe the throat and help thin mucus. Avoid alcohol, spicy foods, and acidic drinks such as orange juice. These can irritate the throat.  · Gargle with warm saltwater (1 teaspoon of salt to 8 ounces of warm water).  · Use a humidifier to keep air moist and relieve throat dryness.  · Try over-the-counter pain relievers such as acetaminophen or ibuprofen. Use as directed, and dont exceed the recommended dose. Dont give aspirin to children.   Are antibiotics needed?  If your sore throat is due to a bacterial infection, antibiotics may speed healing and prevent complications. Although group A streptococcus ("strep throat" or GAS) is the major treatable infection for a sore throat, GAS causes only 5% to 15% of sore throats in adults who seek medical care. Most sore throats are caused by cold or flu viruses. And antibiotics dont treat viral illness. In fact, using antibiotics when theyre not needed may produce bacteria that are harder to kill. Your healthcare provider will prescribe antibiotics only if he or " she thinks they are likely to help.  If antibiotics are prescribed  Take the medicine exactly as directed. Be sure to finish your prescription even if youre feeling better. And be sure to ask your healthcare provider or pharmacist what side effects are common and what to do about them.  Is surgery needed?  In some cases, tonsils need to be removed. This is often done as outpatient (same-day) surgery. Your healthcare provider may advise removing the tonsils in cases of:  · Several severe bouts of tonsillitis in a year. Severe episodes include those that lead to missed days of school or work, or that need to be treated with antibiotics.  · Tonsillitis that causes breathing problems during sleep  · Tonsillitis caused by food particles collecting in pouches in the tonsils (cryptic tonsillitis)  Call your healthcare provider if any of the following occur:  · Symptoms worsen, or new symptoms develop.  · Swollen tonsils make breathing difficult.  · The pain is severe enough to keep you from drinking liquids.  · A skin rash, hives, or wheezing develops. Any of these could signal an allergic reaction to antibiotics.  · Symptoms dont improve within a week.  · Symptoms dont improve within 2 to 3 days of starting antibiotics.   Date Last Reviewed: 10/1/2016  © 4161-3907 [employer]. [address] [place] [address], [place] [address]. All rights reserved. This information is not intended as a substitute for professional medical care. Always follow your healthcare professional's instructions.

## 2018-12-27 LAB — BACTERIA THROAT CULT: NORMAL

## 2019-01-16 ENCOUNTER — TELEPHONE (OUTPATIENT)
Dept: INTERNAL MEDICINE | Facility: CLINIC | Age: 84
End: 2019-01-16

## 2019-01-16 NOTE — TELEPHONE ENCOUNTER
----- Message from Xochitl Bradshaw sent at 1/16/2019  2:41 PM CST -----  Contact: Patient  Patient requesting an appt for issues with her blood pressure medication, causing her chronic diarrhea, but there are no openings, please call her back at [phone]. Thank you

## 2019-01-17 ENCOUNTER — TELEPHONE (OUTPATIENT)
Dept: INTERNAL MEDICINE | Facility: CLINIC | Age: 84
End: 2019-01-17

## 2019-01-21 ENCOUNTER — OFFICE VISIT (OUTPATIENT)
Dept: INTERNAL MEDICINE | Facility: CLINIC | Age: 84
End: 2019-01-21
Payer: MEDICARE

## 2019-01-21 VITALS
DIASTOLIC BLOOD PRESSURE: 64 MMHG | WEIGHT: 132.25 LBS | TEMPERATURE: 98 F | OXYGEN SATURATION: 96 % | HEART RATE: 76 BPM | SYSTOLIC BLOOD PRESSURE: 128 MMHG | BODY MASS INDEX: 27.76 KG/M2 | HEIGHT: 58 IN

## 2019-01-21 DIAGNOSIS — M81.0 OSTEOPOROSIS, UNSPECIFIED OSTEOPOROSIS TYPE, UNSPECIFIED PATHOLOGICAL FRACTURE PRESENCE: Chronic | ICD-10-CM

## 2019-01-21 DIAGNOSIS — K52.9 CHRONIC DIARRHEA: ICD-10-CM

## 2019-01-21 DIAGNOSIS — I70.0 ARTERIOSCLEROSIS OF AORTA: ICD-10-CM

## 2019-01-21 DIAGNOSIS — I10 ESSENTIAL HYPERTENSION: Primary | Chronic | ICD-10-CM

## 2019-01-21 DIAGNOSIS — E78.5 HYPERLIPIDEMIA, UNSPECIFIED HYPERLIPIDEMIA TYPE: Chronic | ICD-10-CM

## 2019-01-21 PROCEDURE — 99999 PR PBB SHADOW E&M-EST. PATIENT-LVL III: CPT | Mod: PBBFAC,HCNC,, | Performed by: FAMILY MEDICINE

## 2019-01-21 PROCEDURE — 1101F PR PT FALLS ASSESS DOC 0-1 FALLS W/OUT INJ PAST YR: ICD-10-PCS | Mod: CPTII,HCNC,S$GLB, | Performed by: FAMILY MEDICINE

## 2019-01-21 PROCEDURE — 99499 UNLISTED E&M SERVICE: CPT | Mod: HCNC,S$GLB,, | Performed by: FAMILY MEDICINE

## 2019-01-21 PROCEDURE — 99214 PR OFFICE/OUTPT VISIT, EST, LEVL IV, 30-39 MIN: ICD-10-PCS | Mod: HCNC,S$GLB,, | Performed by: FAMILY MEDICINE

## 2019-01-21 PROCEDURE — 3074F SYST BP LT 130 MM HG: CPT | Mod: CPTII,HCNC,S$GLB, | Performed by: FAMILY MEDICINE

## 2019-01-21 PROCEDURE — 3074F PR MOST RECENT SYSTOLIC BLOOD PRESSURE < 130 MM HG: ICD-10-PCS | Mod: CPTII,HCNC,S$GLB, | Performed by: FAMILY MEDICINE

## 2019-01-21 PROCEDURE — 99214 OFFICE O/P EST MOD 30 MIN: CPT | Mod: HCNC,S$GLB,, | Performed by: FAMILY MEDICINE

## 2019-01-21 PROCEDURE — 1101F PT FALLS ASSESS-DOCD LE1/YR: CPT | Mod: CPTII,HCNC,S$GLB, | Performed by: FAMILY MEDICINE

## 2019-01-21 PROCEDURE — 99499 RISK ADDL DX/OHS AUDIT: ICD-10-PCS | Mod: HCNC,S$GLB,, | Performed by: FAMILY MEDICINE

## 2019-01-21 PROCEDURE — 99999 PR PBB SHADOW E&M-EST. PATIENT-LVL III: ICD-10-PCS | Mod: PBBFAC,HCNC,, | Performed by: FAMILY MEDICINE

## 2019-01-21 PROCEDURE — 3078F PR MOST RECENT DIASTOLIC BLOOD PRESSURE < 80 MM HG: ICD-10-PCS | Mod: CPTII,HCNC,S$GLB, | Performed by: FAMILY MEDICINE

## 2019-01-21 PROCEDURE — 3078F DIAST BP <80 MM HG: CPT | Mod: CPTII,HCNC,S$GLB, | Performed by: FAMILY MEDICINE

## 2019-01-21 RX ORDER — VERAPAMIL HYDROCHLORIDE 240 MG/1
240 CAPSULE, EXTENDED RELEASE ORAL DAILY
Qty: 30 CAPSULE | Refills: 5 | Status: SHIPPED | OUTPATIENT
Start: 2019-01-21 | End: 2019-02-04

## 2019-01-21 NOTE — PROGRESS NOTES
Subjective:       Patient ID: Isamar Roe is a 83 y.o. female.    Chief Complaint: No chief complaint on file.    HPIone year loose stools: few times a day watery to loose. No blood no melena. No abd pain; no wt loss. No fever. No recent abx. No for travel. No well water; no sick contacts  Was off losartan 4 days. Nl bm then but bp went up so resumed  Few times fecal incont in last severl months;nl stool studies 9/18    Past Medical History:   Diagnosis Date    Arthritis     hands    Benign hematuria     [place]    Calcific tendinitis of left shoulder 1/27/2016    Cataract     Diverticulosis     colonoscopy 5/22/2012    Duodenal diverticulum     EGD 5/22/2012    GERD (gastroesophageal reflux disease)     Hemorrhoids     colonoscopy 5/22/2012    History of skin cancer     per patient s/p excision, no chemo or radiation, sees outside derm, Dr. Tinsley.    Hyperlipidemia     Hypertension     Impingement syndrome of left shoulder 1/27/2016    Osteoporosis     declines tx;11/17/16 phone note    Pneumonia     as a child    Sciatica     [place]; dr castillo    Skin cancer     Dr. Noriega    Vitamin D deficiency disease      Past Surgical History:   Procedure Laterality Date    APPENDECTOMY      CARPAL TUNNEL RELEASE Bilateral     CHOLECYSTECTOMY      FACETECTOMY Left 9/16/2014    Performed [facility] at [facility] OR 2ND FLR    HYSTERECTOMY      joint removal in right thumb      LAMINECTOMY-ANGELIA-SEMI Left 9/16/2014    [facility]formed [facility] at [facility] OR 2ND FLR    MICRODISKECTOMY-MINIMALLY INVASIVE Left 9/16/2014    Performed [facility] at [facility] OR 2ND FLR    SKIN CANCER EXCISION      SPINE SURGERY  9/16/14    L4/5 laminectomy;dr lozada    TOE SURGERY      LT FIFTH     Family History   Problem Relation Age of Onset    Heart disease Mother     Hypertension Mother     Cataracts Mother     Heart disease Father     Hypertension Father     Cataracts Father     Heart disease Brother  45    Heart disease Sister         A-fib    Heart disease Sister         heart bypass x 5    Kidney disease Brother 70    Heart disease Brother     Cancer Maternal Aunt         breast    Diabetes Maternal Grandmother     Diabetes Maternal Aunt     Hypertension Other         multiple family members    Cancer Other         multiple with skin cancer    Stroke Neg Hx     Melanoma Neg Hx      Social History     Socioeconomic History    Marital status:      Spouse name: AMBREEN    Number of children: 4    Years of education: None    Highest education level: None   Social Needs    Financial resource strain: None    Food insecurity - worry: None    Food insecurity - inability: None    Transportation needs - medical: None    Transportation needs - non-medical: None   Occupational History    None   Tobacco Use    Smoking status: Never Smoker    Smokeless tobacco: Never Used   Substance and Sexual Activity    Alcohol use: No    Drug use: No    Sexual activity: Yes     Partners: Male     Birth control/protection: See Surgical Hx   Other Topics Concern    Are you pregnant or think you may be? No    Breast-feeding No   Social History Narrative    Wears a seatbelt.       Review of Systems    Objective:      Physical Exam   Constitutional: She appears well-developed and well-nourished.   Cardiovascular: Normal rate and regular rhythm.   Pulmonary/Chest: Effort normal and breath sounds normal.   Abdominal: Soft. Bowel sounds are normal. There is no tenderness.       Assessment:     chronic diarrhea  1. Essential hypertension    2. Arteriosclerosis of aorta    3. Osteoporosis, unspecified osteoporosis type, unspecified pathological fracture presence    4. Hyperlipidemia, unspecified hyperlipidemia type    5. Chronic diarrhea        Plan:       **F/u rubio two weeks on bp, diarrhea off losart and inc verap to 240 today  If diarr not resolved then gi  If diarr stays gone then add losart to allerg list*     If bp not controlled then can add amlod 2.5     Essential hypertension    Arteriosclerosis of aorta    Osteoporosis, unspecified osteoporosis type, unspecified pathological fracture presence    Hyperlipidemia, unspecified hyperlipidemia type    Chronic diarrhea    Other orders  -     verapamil (VERELAN) 240 MG C24P; Take 1 capsule (240 mg total) by mouth once daily.  Dispense: 30 capsule; Refill: 5

## 2019-01-22 ENCOUNTER — PATIENT OUTREACH (OUTPATIENT)
Dept: ADMINISTRATIVE | Facility: HOSPITAL | Age: 84
End: 2019-01-22

## 2019-01-22 NOTE — PROGRESS NOTES
Calling to possible schedule DEXA scan.  Attempted to contact patient regarding scheduling for DEXA   No answer. Left a detailed voicemail message including call back number.

## 2019-02-04 ENCOUNTER — OFFICE VISIT (OUTPATIENT)
Dept: INTERNAL MEDICINE | Facility: CLINIC | Age: 84
End: 2019-02-04
Payer: MEDICARE

## 2019-02-04 ENCOUNTER — HOSPITAL ENCOUNTER (OUTPATIENT)
Dept: RADIOLOGY | Facility: HOSPITAL | Age: 84
Discharge: HOME | End: 2019-02-04
Attending: NURSE PRACTITIONER
Payer: MEDICARE

## 2019-02-04 VITALS
DIASTOLIC BLOOD PRESSURE: 60 MMHG | TEMPERATURE: 97 F | SYSTOLIC BLOOD PRESSURE: 118 MMHG | HEART RATE: 76 BPM | HEIGHT: 58 IN | BODY MASS INDEX: 28.05 KG/M2 | OXYGEN SATURATION: 96 % | WEIGHT: 133.63 LBS

## 2019-02-04 DIAGNOSIS — M25.551 RIGHT HIP PAIN: ICD-10-CM

## 2019-02-04 DIAGNOSIS — I10 ESSENTIAL HYPERTENSION: ICD-10-CM

## 2019-02-04 DIAGNOSIS — Z09 FOLLOW UP: Primary | ICD-10-CM

## 2019-02-04 PROCEDURE — 3074F SYST BP LT 130 MM HG: CPT | Mod: CPTII,S$GLB,, | Performed by: NURSE PRACTITIONER

## 2019-02-04 PROCEDURE — 3078F PR MOST RECENT DIASTOLIC BLOOD PRESSURE < 80 MM HG: ICD-10-PCS | Mod: CPTII,S$GLB,, | Performed by: NURSE PRACTITIONER

## 2019-02-04 PROCEDURE — 3288F FALL RISK ASSESSMENT DOCD: CPT | Mod: CPTII,S$GLB,, | Performed by: NURSE PRACTITIONER

## 2019-02-04 PROCEDURE — 72200 X-RAY EXAM SI JOINTS: CPT | Mod: TC,HCNC

## 2019-02-04 PROCEDURE — 3288F PR FALLS RISK ASSESSMENT DOCUMENTED: ICD-10-PCS | Mod: CPTII,S$GLB,, | Performed by: NURSE PRACTITIONER

## 2019-02-04 PROCEDURE — 3078F DIAST BP <80 MM HG: CPT | Mod: CPTII,S$GLB,, | Performed by: NURSE PRACTITIONER

## 2019-02-04 PROCEDURE — 99999 PR PBB SHADOW E&M-EST. PATIENT-LVL IV: CPT | Mod: PBBFAC,,, | Performed by: NURSE PRACTITIONER

## 2019-02-04 PROCEDURE — 72200 XR SACROILIAC JOINTS 3 VIEWS: ICD-10-PCS | Mod: 26,HCNC,, | Performed by: RADIOLOGY

## 2019-02-04 PROCEDURE — 1100F PR PT FALLS ASSESS DOC 2+ FALLS/FALL W/INJURY/YR: ICD-10-PCS | Mod: CPTII,S$GLB,, | Performed by: NURSE PRACTITIONER

## 2019-02-04 PROCEDURE — 3074F PR MOST RECENT SYSTOLIC BLOOD PRESSURE < 130 MM HG: ICD-10-PCS | Mod: CPTII,S$GLB,, | Performed by: NURSE PRACTITIONER

## 2019-02-04 PROCEDURE — 1100F PTFALLS ASSESS-DOCD GE2>/YR: CPT | Mod: CPTII,S$GLB,, | Performed by: NURSE PRACTITIONER

## 2019-02-04 PROCEDURE — 72200 X-RAY EXAM SI JOINTS: CPT | Mod: 26,HCNC,, | Performed by: RADIOLOGY

## 2019-02-04 PROCEDURE — 99214 PR OFFICE/OUTPT VISIT, EST, LEVL IV, 30-39 MIN: ICD-10-PCS | Mod: S$GLB,,, | Performed by: NURSE PRACTITIONER

## 2019-02-04 PROCEDURE — 99214 OFFICE O/P EST MOD 30 MIN: CPT | Mod: S$GLB,,, | Performed by: NURSE PRACTITIONER

## 2019-02-04 PROCEDURE — 99499 RISK ADDL DX/OHS AUDIT: ICD-10-PCS | Mod: HCNC,S$GLB,, | Performed by: NURSE PRACTITIONER

## 2019-02-04 PROCEDURE — 99999 PR PBB SHADOW E&M-EST. PATIENT-LVL IV: ICD-10-PCS | Mod: PBBFAC,,, | Performed by: NURSE PRACTITIONER

## 2019-02-04 PROCEDURE — 99499 UNLISTED E&M SERVICE: CPT | Mod: HCNC,S$GLB,, | Performed by: NURSE PRACTITIONER

## 2019-02-04 RX ORDER — VERAPAMIL HYDROCHLORIDE 120 MG/1
120 TABLET, FILM COATED ORAL 2 TIMES DAILY
Qty: 60 TABLET | Refills: 5 | Status: SHIPPED | OUTPATIENT
Start: 2019-02-04 | End: 2019-10-01 | Stop reason: SDUPTHER

## 2019-02-04 RX ORDER — MELOXICAM 7.5 MG/1
7.5 TABLET ORAL DAILY PRN
Qty: 30 TABLET | Refills: 1 | Status: SHIPPED | OUTPATIENT
Start: 2019-02-04 | End: 2019-02-12

## 2019-02-04 NOTE — PROGRESS NOTES
Subjective:       Patient ID: Isamar Roe is a 83 y.o. female.    Chief Complaint: Follow-up    Patient present for a follow up appointment.  Diarrhea has stopped.  Added losartan to allergy list.  Blood pressure is good.   Has home readings with some systolic 140-150's.  Reports she's very tired after taking the verapamil until about noon.  Reports right hip pain that started about one week ago.  Radiates to buttock and leg (right). Tried Tylenol.  Reports some improvement after walking around.        Review of Systems   Constitutional: Positive for fatigue. Negative for chills.   Respiratory: Negative for cough and shortness of breath.    Musculoskeletal: Positive for arthralgias, gait problem and myalgias.   Skin: Negative for color change and wound.   Psychiatric/Behavioral: Negative for agitation and confusion.       Objective:      Physical Exam   Constitutional: She is oriented to person, place, and time. Vital signs are normal. She appears well-developed and well-nourished.   HENT:   Head: Normocephalic and atraumatic.   Neck: Normal range of motion.   Cardiovascular: Normal rate and regular rhythm.   Pulmonary/Chest: Effort normal and breath sounds normal.   Musculoskeletal: She exhibits tenderness.        Back:    Neurological: She is alert and oriented to person, place, and time.   Skin: Skin is warm.   Psychiatric: She has a normal mood and affect. Her behavior is normal.       Assessment:       1. Follow up    2. Right hip pain    3. Essential hypertension        Plan:         Follow up    Right hip pain  -     X-Ray Sacroiliac Joints 3 Views; Future; Expected date: 02/04/2019  -     meloxicam (MOBIC) 7.5 MG tablet; Take 1 tablet (7.5 mg total) by mouth daily as needed for Pain. Take with food  Dispense: 30 tablet; Refill: 1    Essential hypertension  -     verapamil (CALAN) 120 MG tablet; Take 1 tablet (120 mg total) by mouth 2 (two) times daily.  Dispense: 60 tablet; Refill: 5        Will divide  current dose of verapamil to 120 mg twice a day to help with fatigue.  Continue to monitor blood pressure at home.  Bring to follow up in 2 weeks.

## 2019-02-11 ENCOUNTER — OFFICE VISIT (OUTPATIENT)
Dept: INTERNAL MEDICINE | Facility: CLINIC | Age: 84
End: 2019-02-11
Payer: MEDICARE

## 2019-02-11 VITALS
DIASTOLIC BLOOD PRESSURE: 60 MMHG | SYSTOLIC BLOOD PRESSURE: 122 MMHG | HEART RATE: 81 BPM | OXYGEN SATURATION: 98 % | HEIGHT: 58 IN | BODY MASS INDEX: 28 KG/M2 | WEIGHT: 133.38 LBS | TEMPERATURE: 97 F

## 2019-02-11 DIAGNOSIS — M54.41 ACUTE RIGHT-SIDED LOW BACK PAIN WITH RIGHT-SIDED SCIATICA: Primary | ICD-10-CM

## 2019-02-11 DIAGNOSIS — I10 ESSENTIAL HYPERTENSION: Chronic | ICD-10-CM

## 2019-02-11 PROCEDURE — 99999 PR PBB SHADOW E&M-EST. PATIENT-LVL V: ICD-10-PCS | Mod: PBBFAC,HCNC,, | Performed by: NURSE PRACTITIONER

## 2019-02-11 PROCEDURE — 99213 OFFICE O/P EST LOW 20 MIN: CPT | Mod: HCNC,S$GLB,, | Performed by: NURSE PRACTITIONER

## 2019-02-11 PROCEDURE — 99999 PR PBB SHADOW E&M-EST. PATIENT-LVL V: CPT | Mod: PBBFAC,HCNC,, | Performed by: NURSE PRACTITIONER

## 2019-02-11 PROCEDURE — 3074F SYST BP LT 130 MM HG: CPT | Mod: HCNC,CPTII,S$GLB, | Performed by: NURSE PRACTITIONER

## 2019-02-11 PROCEDURE — 3288F PR FALLS RISK ASSESSMENT DOCUMENTED: ICD-10-PCS | Mod: HCNC,CPTII,S$GLB, | Performed by: NURSE PRACTITIONER

## 2019-02-11 PROCEDURE — 3078F PR MOST RECENT DIASTOLIC BLOOD PRESSURE < 80 MM HG: ICD-10-PCS | Mod: HCNC,CPTII,S$GLB, | Performed by: NURSE PRACTITIONER

## 2019-02-11 PROCEDURE — 99213 PR OFFICE/OUTPT VISIT, EST, LEVL III, 20-29 MIN: ICD-10-PCS | Mod: HCNC,S$GLB,, | Performed by: NURSE PRACTITIONER

## 2019-02-11 PROCEDURE — 1100F PTFALLS ASSESS-DOCD GE2>/YR: CPT | Mod: HCNC,CPTII,S$GLB, | Performed by: NURSE PRACTITIONER

## 2019-02-11 PROCEDURE — 3288F FALL RISK ASSESSMENT DOCD: CPT | Mod: HCNC,CPTII,S$GLB, | Performed by: NURSE PRACTITIONER

## 2019-02-11 PROCEDURE — 1100F PR PT FALLS ASSESS DOC 2+ FALLS/FALL W/INJURY/YR: ICD-10-PCS | Mod: HCNC,CPTII,S$GLB, | Performed by: NURSE PRACTITIONER

## 2019-02-11 PROCEDURE — 99499 UNLISTED E&M SERVICE: CPT | Mod: HCNC,S$GLB,, | Performed by: NURSE PRACTITIONER

## 2019-02-11 PROCEDURE — 3074F PR MOST RECENT SYSTOLIC BLOOD PRESSURE < 130 MM HG: ICD-10-PCS | Mod: HCNC,CPTII,S$GLB, | Performed by: NURSE PRACTITIONER

## 2019-02-11 PROCEDURE — 3078F DIAST BP <80 MM HG: CPT | Mod: HCNC,CPTII,S$GLB, | Performed by: NURSE PRACTITIONER

## 2019-02-11 PROCEDURE — 99499 RISK ADDL DX/OHS AUDIT: ICD-10-PCS | Mod: HCNC,S$GLB,, | Performed by: NURSE PRACTITIONER

## 2019-02-11 NOTE — PROGRESS NOTES
Subjective:       Patient ID: Isamar Roe is a 83 y.o. female.    Chief Complaint: Hip Pain (lower back right side)    Patient presents with right hip/buttock paint that started about 2 weeks ago.  X-ray-degenerative changes. Taking Mobic prescribed from last visit.  No great relief.  Also doing warm soaks.  Taking verapamil in 2 doses.  This has helped her symptoms.  Good blood pressure readings: 130's/70's.  One 140's systolic.       Review of Systems   Constitutional: Negative for chills and fatigue.   Respiratory: Negative for cough and shortness of breath.    Musculoskeletal: Positive for back pain, gait problem and myalgias.   Skin: Negative for color change and wound.   Psychiatric/Behavioral: Negative for agitation and confusion.       Objective:      Physical Exam   Constitutional: She is oriented to person, place, and time. She appears well-developed and well-nourished.   HENT:   Head: Normocephalic and atraumatic.   Neck: Normal range of motion.   Cardiovascular: Normal rate.   Pulmonary/Chest: Effort normal.   Musculoskeletal: She exhibits tenderness.        Lumbar back: She exhibits tenderness. She exhibits normal range of motion.        Back:    Neurological: She is alert and oriented to person, place, and time.   Skin: Skin is warm.   Psychiatric: She has a normal mood and affect. Her behavior is normal.       Assessment:       1. Acute right-sided low back pain with right-sided sciatica    2. Essential hypertension        Plan:         Acute right-sided low back pain with right-sided sciatica  Comments:  Can take an extra Mobic.  Add Tylenol to the regimen.  Follow up as scheduled.   Orders:  -     Ambulatory Referral to Physiatry    Essential hypertension  Comments:  Continue medication regimen        Continue medications as advised.  Will get physiatry appointment.

## 2019-02-12 ENCOUNTER — INITIAL CONSULT (OUTPATIENT)
Dept: PHYSICAL MEDICINE AND REHAB | Facility: CLINIC | Age: 84
End: 2019-02-12
Payer: MEDICARE

## 2019-02-12 VITALS
RESPIRATION RATE: 16 BRPM | DIASTOLIC BLOOD PRESSURE: 70 MMHG | WEIGHT: 133 LBS | BODY MASS INDEX: 27.92 KG/M2 | SYSTOLIC BLOOD PRESSURE: 141 MMHG | HEIGHT: 58 IN | HEART RATE: 80 BPM

## 2019-02-12 DIAGNOSIS — M47.26 OSTEOARTHRITIS OF SPINE WITH RADICULOPATHY, LUMBAR REGION: Primary | ICD-10-CM

## 2019-02-12 DIAGNOSIS — R29.898 WEAKNESS OF BOTH HIPS: ICD-10-CM

## 2019-02-12 PROCEDURE — 99999 PR PBB SHADOW E&M-EST. PATIENT-LVL III: ICD-10-PCS | Mod: PBBFAC,HCNC,, | Performed by: PHYSICAL MEDICINE & REHABILITATION

## 2019-02-12 PROCEDURE — 3077F SYST BP >= 140 MM HG: CPT | Mod: HCNC,CPTII,S$GLB, | Performed by: PHYSICAL MEDICINE & REHABILITATION

## 2019-02-12 PROCEDURE — 1100F PTFALLS ASSESS-DOCD GE2>/YR: CPT | Mod: HCNC,CPTII,S$GLB, | Performed by: PHYSICAL MEDICINE & REHABILITATION

## 2019-02-12 PROCEDURE — 99214 OFFICE O/P EST MOD 30 MIN: CPT | Mod: HCNC,S$GLB,, | Performed by: PHYSICAL MEDICINE & REHABILITATION

## 2019-02-12 PROCEDURE — 3077F PR MOST RECENT SYSTOLIC BLOOD PRESSURE >= 140 MM HG: ICD-10-PCS | Mod: HCNC,CPTII,S$GLB, | Performed by: PHYSICAL MEDICINE & REHABILITATION

## 2019-02-12 PROCEDURE — 3078F DIAST BP <80 MM HG: CPT | Mod: HCNC,CPTII,S$GLB, | Performed by: PHYSICAL MEDICINE & REHABILITATION

## 2019-02-12 PROCEDURE — 99214 PR OFFICE/OUTPT VISIT, EST, LEVL IV, 30-39 MIN: ICD-10-PCS | Mod: HCNC,S$GLB,, | Performed by: PHYSICAL MEDICINE & REHABILITATION

## 2019-02-12 PROCEDURE — 3288F PR FALLS RISK ASSESSMENT DOCUMENTED: ICD-10-PCS | Mod: HCNC,CPTII,S$GLB, | Performed by: PHYSICAL MEDICINE & REHABILITATION

## 2019-02-12 PROCEDURE — 99999 PR PBB SHADOW E&M-EST. PATIENT-LVL III: CPT | Mod: PBBFAC,HCNC,, | Performed by: PHYSICAL MEDICINE & REHABILITATION

## 2019-02-12 PROCEDURE — 3078F PR MOST RECENT DIASTOLIC BLOOD PRESSURE < 80 MM HG: ICD-10-PCS | Mod: HCNC,CPTII,S$GLB, | Performed by: PHYSICAL MEDICINE & REHABILITATION

## 2019-02-12 PROCEDURE — 1100F PR PT FALLS ASSESS DOC 2+ FALLS/FALL W/INJURY/YR: ICD-10-PCS | Mod: HCNC,CPTII,S$GLB, | Performed by: PHYSICAL MEDICINE & REHABILITATION

## 2019-02-12 PROCEDURE — 3288F FALL RISK ASSESSMENT DOCD: CPT | Mod: HCNC,CPTII,S$GLB, | Performed by: PHYSICAL MEDICINE & REHABILITATION

## 2019-02-12 RX ORDER — KETOROLAC TROMETHAMINE 10 MG/1
10 TABLET, FILM COATED ORAL 2 TIMES DAILY
Qty: 8 TABLET | Refills: 0 | Status: SHIPPED | OUTPATIENT
Start: 2019-02-12 | End: 2019-02-16

## 2019-02-12 RX ORDER — GABAPENTIN 300 MG/1
300 CAPSULE ORAL NIGHTLY
Qty: 30 CAPSULE | Refills: 1 | Status: SHIPPED | OUTPATIENT
Start: 2019-02-12 | End: 2019-04-16 | Stop reason: SDUPTHER

## 2019-02-12 NOTE — LETTER
February 12, 2019      Elizabeth Jj, BANDAR  [address]  [place] [address]           [facility]  [address] [place] [address]  Phone: [phone]  Fax: [phone]          Patient: Isamar Roe   MR Number: [id number]   YOB: 1935   Date of Visit: 2/12/2019       Dear Elizabeth Jj:    Thank you for referring Isamar Roe to me for evaluation. Attached you will find relevant portions of my assessment and plan of care.    If you have questions, please do not hesitate to call me. I look forward to following Isamar Roe along with you.    Sincerely,    Iraida Person MD    Enclosure  CC:  No Recipients    If you would like to receive this communication electronically, please contact [email or web address] or [phone] to request more information on [facility] [employer] access.    For providers and/or their staff who would like to refer a patient to [facility], please contact us through our one-stop-shop provider referral line, [facility], at [phone].    If you feel you have received this communication in error or would no longer like to receive these types of communications, please e-mail [email or web address]

## 2019-02-12 NOTE — PROGRESS NOTES
PM&R NEW PATIENT HISTORY & PHYSICAL :    Referring Provider: NIALL Jj    Chief Complaint   Patient presents with    Back Pain     low back pain, to right leg       HPI: This is a 83 y.o.  female being seen in clinic today for evaluation of right achy low back pain that began a few weeks ago.  She has some burning pain and  discomfort into her gluteus and post leg to the ankle.  She has a history of lumbar laminectomy with  Dr Moscoso ([facility]) as well as ESIs with Dr Cruz.  Overall she is active and exercises regularly.  She is limited in ambulation due to leg pain.     History obtained from patient    Functional History:  Walking: limited  Transfers: Independent  Assistive devices: No  Power mobility: No  Falls: None     Needs help with:  Nothing - all ADLS normal    Cooking   Cleaning  Bathing   Dressing   Toileting     Past family, medical, social, and surgical history reviewed in chart    Review of Systems:     General- denies lethargy, weight change, fever, chills  Head/neck- denies swallowing difficulties  ENT- denies hearing changes  Cardiovascular-denies chest pain  Pulmonary- denies shortness of breath  GI- denies constipation or bowel incontinence  - denies bladder incontinence  Skin- denies wounds or rashes  Musculoskeletal- +/-weakness, +pain  Neurologic- +numbness and tingling  Psychiatric- denies depressive or psychotic features, denies anxiety  Lymphatic-denies swelling  Endocrine- denies hypoglycemic symptoms/DM history  All other pertinent systems negative     Physical Examination:  General: Well developed, well nourished female, NAD  HEENT:NCAT EOMI bilaterally   Pulmonary:Normal respirations    Spinal Examination: CERVICAL  Active ROM is within normal limits.  Inspection: No deformity of spinal alignment.      Spinal Examination: LUMBAR or THORACIC  Active ROM is limited at endranges  Inspection: No deformity of spinal alignment.  No palpable olisthesis.  Palpation: No vertebral tenderness to  percussion.  ttp at si joint on right, ttp at right gluteus musculature and gt bursa  Facet loading neg bilaterally  SLR Test (seated):positive on right   Able to stand on heels and toes  Trendelenburg test: negative    Bilateral Upper and Lower Extremities:  Pulses are 2+ at radial,  bilaterally.  Shoulder/Elbow/Wrist/Hand ROM   Hip/Knee/Ankle ROM wnl  Bilateral Extremities show normal capillary refill.  No signs of cyanosis, rubor, edema, skin changes, or dysvascular changes of appendages.  Nails appear intact.    Neurological Exam:  Cranial Nerves:  II-XII grossly intact    Manual Muscle Testing: (Motor 5=normal)  RIGHT Lower extremity: Hip flexion 4+/5, Hip Abduction 4/5, Hip Adduction 4+/5, Knee extension 5/5, Knee flexion 5/5, Ankle dorsiflexion 5/5, Extensor hallucis longus 5/5, Ankle plantarflexion 5/5  LEFT Lower extremity:  Hip flexion 4+/5, Hip Abduction 4/5,Hip Adduction 4+/5, Knee extension 5/5, Knee flexion 5/5, Ankle dorsiflexion 5/5, Extensor hallucis longus 5/5, Ankle plantarflexion 5/5    No focal atrophy is noted of either upper or lower extremity.    Bilateral Reflexes:2+patellar    No clonus at knee or ankle.    Sensation: tested to light touch  - intact in legs    Gait: Narrow base and good arm swing.      IMPRESSION/PLAN: This is a 83 y.o.  female with lumbar DJD/DDD, right leg radiculitis, h/o laminectomy    1. Rx for PT-O2 in walker-Core and hip strength, stretch, light traction, ROM, modalities   2. Ketorolac 10mg bid x4 days, gabapentin 300mg QHS  3. Handouts on back care, exercises, stretch, etc provided  4. If not responding conservatively, will refer to KYLE Person M.D.  Physical Medicine and Rehab

## 2019-02-26 ENCOUNTER — TELEPHONE (OUTPATIENT)
Dept: PHYSICAL MEDICINE AND REHAB | Facility: CLINIC | Age: 84
End: 2019-02-26

## 2019-02-26 NOTE — TELEPHONE ENCOUNTER
----- Message from Martell Verma sent at 2/26/2019  7:35 AM CST -----  Contact: self [phone]  .Type:  Sooner Apoointment Request    Caller is requesting a sooner appointment.  Caller declined first available appointment listed below.  Caller will not accept being placed on the waitlist and is requesting a message be sent to doctor.  Name of Caller:Isamar Roe  When is the first available appointment?03/12  Symptoms:Back Pain  Would the patient rather a call back or a response via [facility]? Call back   Best Call Back Number [phone]  Additional Information:

## 2019-02-28 ENCOUNTER — OFFICE VISIT (OUTPATIENT)
Dept: OPHTHALMOLOGY | Facility: CLINIC | Age: 84
End: 2019-02-28
Payer: MEDICARE

## 2019-02-28 DIAGNOSIS — H25.013 CATARACT CORTICAL, SENILE, BILATERAL: ICD-10-CM

## 2019-02-28 DIAGNOSIS — D31.31 CHOROIDAL NEVUS OF RIGHT EYE: ICD-10-CM

## 2019-02-28 DIAGNOSIS — H52.203 HYPEROPIC ASTIGMATISM OF BOTH EYES: ICD-10-CM

## 2019-02-28 DIAGNOSIS — H25.13 NUCLEAR SCLEROSIS, BILATERAL: Primary | ICD-10-CM

## 2019-02-28 DIAGNOSIS — H52.4 PRESBYOPIA: ICD-10-CM

## 2019-02-28 PROCEDURE — 99499 RISK ADDL DX/OHS AUDIT: ICD-10-PCS | Mod: HCNC,S$GLB,, | Performed by: OPTOMETRIST

## 2019-02-28 PROCEDURE — 92015 DETERMINE REFRACTIVE STATE: CPT | Mod: HCNC,S$GLB,, | Performed by: OPTOMETRIST

## 2019-02-28 PROCEDURE — 92014 COMPRE OPH EXAM EST PT 1/>: CPT | Mod: HCNC,S$GLB,, | Performed by: OPTOMETRIST

## 2019-02-28 PROCEDURE — 99499 UNLISTED E&M SERVICE: CPT | Mod: HCNC,S$GLB,, | Performed by: OPTOMETRIST

## 2019-02-28 PROCEDURE — 92014 PR EYE EXAM, EST PATIENT,COMPREHESV: ICD-10-PCS | Mod: HCNC,S$GLB,, | Performed by: OPTOMETRIST

## 2019-02-28 PROCEDURE — 92015 PR REFRACTION: ICD-10-PCS | Mod: HCNC,S$GLB,, | Performed by: OPTOMETRIST

## 2019-02-28 NOTE — PROGRESS NOTES
HPI     Last [facility] exam 02/15/2018  Update CL RX   Nuclear sclerosis, bilateral  Choroidal nevus, OD  Decreased distance and near vision     Last edited by Sonido Garibay MA on 2/28/2019  2:18 PM. (History)            Assessment /Plan     For exam results, see Encounter Report.    Nuclear sclerosis, bilateral    Cataract cortical, senile, bilateral    Choroidal nevus of right eye    Hyperopic astigmatism of both eyes    Presbyopia      No change in nevus OD = will follow.  Advancing NS/cortical cataracts OU = offered CE evaluation with Satinder versus waiting.  She  Wants  To  See Satinder..  OH OK OU otherwise.  Spec and CL Rx given.  RTC one year or prn for CE evaluation.

## 2019-03-01 ENCOUNTER — TELEPHONE (OUTPATIENT)
Dept: PHYSICAL MEDICINE AND REHAB | Facility: CLINIC | Age: 84
End: 2019-03-01

## 2019-03-05 ENCOUNTER — OFFICE VISIT (OUTPATIENT)
Dept: PAIN MEDICINE | Facility: CLINIC | Age: 84
End: 2019-03-05
Payer: MEDICARE

## 2019-03-05 VITALS
SYSTOLIC BLOOD PRESSURE: 150 MMHG | HEART RATE: 77 BPM | DIASTOLIC BLOOD PRESSURE: 71 MMHG | HEIGHT: 58 IN | BODY MASS INDEX: 28.27 KG/M2 | WEIGHT: 134.69 LBS

## 2019-03-05 DIAGNOSIS — M47.816 LUMBAR SPONDYLOSIS: ICD-10-CM

## 2019-03-05 DIAGNOSIS — M54.16 LUMBAR RADICULOPATHY: Primary | ICD-10-CM

## 2019-03-05 PROCEDURE — 1101F PT FALLS ASSESS-DOCD LE1/YR: CPT | Mod: HCNC,CPTII,S$GLB, | Performed by: ANESTHESIOLOGY

## 2019-03-05 PROCEDURE — 3078F DIAST BP <80 MM HG: CPT | Mod: HCNC,CPTII,S$GLB, | Performed by: ANESTHESIOLOGY

## 2019-03-05 PROCEDURE — 3077F PR MOST RECENT SYSTOLIC BLOOD PRESSURE >= 140 MM HG: ICD-10-PCS | Mod: HCNC,CPTII,S$GLB, | Performed by: ANESTHESIOLOGY

## 2019-03-05 PROCEDURE — 99204 PR OFFICE/OUTPT VISIT, NEW, LEVL IV, 45-59 MIN: ICD-10-PCS | Mod: HCNC,S$GLB,, | Performed by: ANESTHESIOLOGY

## 2019-03-05 PROCEDURE — 3078F PR MOST RECENT DIASTOLIC BLOOD PRESSURE < 80 MM HG: ICD-10-PCS | Mod: HCNC,CPTII,S$GLB, | Performed by: ANESTHESIOLOGY

## 2019-03-05 PROCEDURE — 3077F SYST BP >= 140 MM HG: CPT | Mod: HCNC,CPTII,S$GLB, | Performed by: ANESTHESIOLOGY

## 2019-03-05 PROCEDURE — 99999 PR PBB SHADOW E&M-EST. PATIENT-LVL III: ICD-10-PCS | Mod: PBBFAC,HCNC,, | Performed by: ANESTHESIOLOGY

## 2019-03-05 PROCEDURE — 99999 PR PBB SHADOW E&M-EST. PATIENT-LVL III: CPT | Mod: PBBFAC,HCNC,, | Performed by: ANESTHESIOLOGY

## 2019-03-05 PROCEDURE — 99204 OFFICE O/P NEW MOD 45 MIN: CPT | Mod: HCNC,S$GLB,, | Performed by: ANESTHESIOLOGY

## 2019-03-05 PROCEDURE — 1101F PR PT FALLS ASSESS DOC 0-1 FALLS W/OUT INJ PAST YR: ICD-10-PCS | Mod: HCNC,CPTII,S$GLB, | Performed by: ANESTHESIOLOGY

## 2019-03-05 RX ORDER — GABAPENTIN 300 MG/1
CAPSULE ORAL
Qty: 90 CAPSULE | Refills: 0 | Status: SHIPPED | OUTPATIENT
Start: 2019-03-05 | End: 2019-03-21 | Stop reason: SDUPTHER

## 2019-03-05 RX ORDER — TIZANIDINE 4 MG/1
4 TABLET ORAL 2 TIMES DAILY PRN
Qty: 60 TABLET | Refills: 0 | Status: SHIPPED | OUTPATIENT
Start: 2019-03-05 | End: 2019-04-04

## 2019-03-05 NOTE — H&P (VIEW-ONLY)
Chief Pain Complaint:  Low-back Pain        History of Present Illness:   Isamar Roe is a 83 y.o. female  who is presenting with a chief complaint of Low-back Pain  . The patient began experiencing this problem abruptly, and the pain has been gradually worsening over the past 3 month(s). The pain is described as throbbing, shooting, burning and electrical and is located in the right lumbar spine. Pain is intermittent and lasts hours. The pain radiates to right leg L4 distribution. The patient rates her pain a 9 out of ten and interferes with activities of daily living a 8 out of ten. Pain is exacerbated by flexion of the lumbar spine, and is improved by rest. Patient reports no prior trauma, prior lumbar surgery  Minimally invasive left L4-L5 hemilaminotomy, foraminotomy   and resection of synovial cyst with Dr Lozada on 9/2014.     - pertinent negatives: No fever, No chills, No weight loss, No bladder dysfunction, No bowel dysfunction, No saddle anesthesia  - pertinent positives: none    - medications, other therapies tried (physical therapy, injections):     >> NSAIDs, Tylenol, gabapentin and zanaflex    >> Has previously undergone Physical Therapy    >> Has previously undergone spinal injection/s         Left Lumbar TFESI x3 in 2014 with Dr Maldonado with minimal relief     Imaging / Labs / Studies (reviewed on 3/5/2019):    Results for orders placed during the hospital encounter of 08/11/14   MRI Lumbar Spine Without Contrast    Narrative Technique: Sagittal T1, sagittal T2, sagittal STIR, axial T1, and axial T2 weighted images of the lumbar spine were obtained without contrast.    Comparison: Lumbar spine MRI 7/5/13    Findings:    There is grade I anterolisthesis of L3 on L4 and L4 on L5.  The vertebral body heights are well maintained, with no fracture.  There is no marrow signal abnormality suspicious for infiltrative process. A hemangioma is present within the T12 vertebral   body.  There is desiccation  throughout the lumbar spine most significantly at L3-4 and L4-5.  The conus is normal in appearance and terminates at the L1 level.  The adjacent soft tissue structures show no significant abnormalities.    L1-L2: There is no focal disk herniation, significant spinal canal stenosis, or neural foraminal narrowing.    L2-L3: There is no focal disk herniation, significant spinal canal stenosis, or neural foraminal narrowing.    L3-L4: Grade I anterolisthesis with uncovering of the disk, broad based disk bulge, facet arthropathy, and ligamentum flavum hypertrophy resulting in moderate spinal canal stenosis and mild bilateral neural foraminal narrowing.    L4-L5: Grade I anterolisthesis with uncovering of the disk, broad based disk bulge, facet arthropathy, and ligamentum flavum hypertrophy resulting in mild spinal canal stenosis and mild bilateral neural foraminal narrowing. A 7 mm cyst remains present   along the superior medial aspect of the left foramen previously 12 mm).    L5-S1: A 10 mm cyst remains present in the inferior aspect of the right neural foramen (previously 13 mm). A 7 mm cyst is also present in the inferior aspect of the left neural foramen. There is no focal disk herniation, significant spinal canal   stenosis, or neural foraminal narrowing.    Impression 1.  Multilevel degenerative changes of the lumbar spine as above most significantly with grade I anterolisthesis at L3-4 and L4-5, moderate spinal canal stenosis at L3-4, and mild spinal canal stenosis at L4-5.    2.  Slight interval decrease in size of the previously identified prominent cysts within the left foramen at L4-5 and bilaterally at L5-S1.  ______________________________________     Electronically signed by resident: Anna Yang  Date:     08/11/14  Time:    11:20          As the supervising and teaching physician, I personally reviewed the images and resident's interpretation and I agree with the findings.          Electronically  signed by: Dr. Aden Benz MD  Date:     08/11/14  Time:    12:37      Results for orders placed during the hospital encounter of 11/22/14   MRI Lumbar Spine W WO Cont    Narrative Exam: MRI of the lumbar spine without contrast.    History:     Low back pain. Radiculopathy.  Status post surgery in September 2014.      Comparison: Prior study dated 08/11/14.    Findings:     Postoperative changes are noted at L4 -- 5, with laminotomy on the left.  Within the left posterolateral epidural space, there is a 2-cm fluid collection, exerting mass effect on the thecal sac, causing marked central canal stenosis, and   probable left L5 neural impingement.  Differential considerations include recurrent synovial cyst as well as postoperative hematoma/seroma.  Abscess not excluded, but felt to be less likely.  Mild rim enhancement is present.  An additional smaller rim   enhancing fluid collection is noted in the deep left posterolateral soft tissues, within normal limits given surgical history.    Otherwise, no significant change from prior study.  No compression fracture identified.  Degenerative anterolisthesis of L3 on L4 again noted.    Impression      Status post left L4-5 laminotomy, with 2-cm fluid collection in the left posterolateral epidural space, causing marked central canal stenosis and probable left L5 neural impingement.  See above additional comments and differential considerations.      Electronically signed by: KOKO ALAN MD  Date:     11/22/14  Time:    14:55        Results for orders placed during the hospital encounter of 11/22/14   CT Lumbar Spine Without Contrast    Narrative Exam: CT of the lumbar spine without contrast.    History:  Back pain.    Findings: Comparison is made to prior exam dated 08/11/14.  No significant change.  Grade 1 degenerative spondylolisthesis of L3 on L4 again noted.  No compression fracture.  Recommend MR of the lumbar spine for more definitive assessment if indicated.     Impression      No compression fracture.  No significant change from prior study.      Electronically signed by: KOKO ALAN MD  Date:     11/22/14  Time:    12:25        Results for orders placed in visit on 02/07/11   X-Ray Lumbar Spine Complete 5 View    Narrative DATE OF EXAM: Feb 7 2011      [facility]   [id number]  -  L-SPINE 4 VIEWS MINIMUM:     [id number]     CLINICAL HISTORY:   724.2 0 LUMBAGO     PROCEDURE COMMENT:        ICD 9 CODE(S):   ()     CPT 4 CODE(S)/MODIFIER(S):   ()     RESULTS: THIS STUDY DEMONSTRATES NARROWING AT THE L4-5 DISC SPACE.    PEDICLES ARE INTACT.  THERE ARE MARGINAL OSTEOPHYTES PROJECTING OFF THE   END PLATES OF L4, L3, L2.  A LARGE AMOUNT OF STOOL IN THE COLON.  CLIPS   IN THE RIGHT UPPER QUADRANT.     IMPRESSION: MILD DEGENERATIVE DISC DISEASE OF THE LUMBAR SPINE.        : ESTELA  Transcribe Date/Time: Feb 7 2011 12:11P  Dictated by : DONNA LOAIZA DR  Report reviewed by:   Read On: Feb 7 2011 11:30A  DONNA LOAIZA M.D.  [id number]  Images were reviewed, findings were verified and document was   electronically  SIGNED BY: DONNA LOAIZA DR On: Feb 8 2011  4:40P        Results for orders placed during the hospital encounter of 06/16/14   X-Ray Lumbar Complete With Flex And Ext    Narrative Findings: There is generalized osteopenia and mild left convex spinal curvature.  Degenerative vertebral endplate spurring present at multiple levels with bilateral facet arthropathy inferiorly.  Mild narrowing of the L3-4 and L4-5 disk spaces with   minimal degree of grade 1 anterolisthesis at the same levels.  Pedicles grossly intact.  No compression fracture.    Impression  As above.      Electronically signed by: MARCOS PRESCOTT MD  Date:     06/16/14  Time:    09:53      Results for orders placed during the hospital encounter of 06/24/13   X-Ray Lumbar Spine AP And Lateral    Narrative Comparison: 02/07/11    Findings:    There is equivocal slight anterior subluxation L3 on L4 and L4 on  L5.  Otherwise essentially stable exam.  Osteopenia, mild scoliosis and spondylosis again noted.  No acute fracture or subluxation.  Most prominent facet arthropathy at the L3 -- 4 through   L5 -- S1 levels.  Multilevel loss of diskal height again noted.  Vascular calcifications and cholecystectomy clips noted.  Multiple calcifications lower pelvis bilaterally.    Impression       1.  Prominent spondylosis again noted with the appearance of minimal grade 1 anterolisthesis L3 on L4 and L4 on L5 is now noted.  Correlate clinically with further evaluation and/or follow-up imaging as warranted.  See above.        Electronically signed by: EDWIGE TOURE III, MD  Date:     06/24/13  Time:    11:03      Results for orders placed during the hospital encounter of 03/30/16   X-Ray Cervical Spine AP And Lateral    Narrative Three views of the cervical spine    Findings: The vertebral bodies demonstrate normal height.  The alignment is within normal limits. There is severe disc space narrowing and spondylosis noted at the C4-5 level.  There is mild/moderate disc space narrowing and spondylosis noted at the C5-6 level.  There is grade 1 spondylolisthesis of C6 on C7.  Minimal spondylosis is noted anteriorly at this level. The C1-C2 articulation is within normal limits. No prevertebral soft tissue swelling.  Generalized osteopenia is noted.    Impression  As above  ______________________________________     Electronically signed by: MAHESH CHAO D.O.  Date:     03/30/16  Time:    11:27      Review of Systems:  CONSTITUTIONAL: patient denies any fever, chills, or weight loss  SKIN: patient denies any rash or itching  RESPIRATORY: patient denies having any shortness of breath  GASTROINTESTINAL: patient denies having any diarrhea, constipation, or bowel incontinence  GENITOURINARY: patient denies having any abnormal bladder function    MUSCULOSKELETAL:  - patient complains of the above noted pain/s (see chief pain  "complaint)    NEUROLOGICAL:   - pain as above  - strength in Lower extremities is intact, BILATERALLY  - sensation in Lower extremities is intact, BILATERALLY  - patient denies any loss of bowel or bladder control      PSYCHIATRIC: patient denies any change in mood    Other:  All other systems reviewed and are negative      Physical Exam:  BP (!) 150/71 (BP Location: Left arm, Patient Position: Sitting)   Pulse 77   Ht 4' 10" (1.473 m)   Wt 61.1 kg (134 lb 11.2 oz)   BMI 28.15 kg/m²  (reviewed on 3/5/2019)  General: Alert and oriented, in no apparent distress.  Gait: normal gait.  Skin: No rashes, No discoloration, No obvious lesions  HEENT: Normocephalic, atraumatic. Pupils equal and round.  Cardiovascular: Regular rate and rhythm , no significant peripheral edema present  Respiratory: Without audible wheezing, without use of accessory muscles of respiration.    Musculoskeletal:    Lumbar Spine    - Pain on flexion of lumbar spine Present  - Straight Leg Raise:  Present    - Pain on extension of lumbar spine Absent  - TTP over the lumbar facet joints Absent  - Lumbar facet loading Absent    -Pain on palpation over the SI joint  Present on right   - RADHA: Absent      Neuro:    Strength:  LE R/L: HF: 5/5, HE: 5/5, KF: 5/5; KE: 5/5; FE: 5/5; FF: 5/5    Extremity Reflexes: Brisk and symmetric throughout.      Extremity Sensory: Sensation to pinprick and temperature symmetric. Proprioception intact.      Psych:  Mood and affect is appropriate      Assessment:    Isamar Roe is a 83 y.o. year old female who is presenting with     Encounter Diagnoses   Name Primary?    Lumbar spondylosis     Lumbar radiculopathy Yes       Plan:    1. Interventional: Schedule patient for Right L4-5, L5-S1 TFESI with local.     2. Pharmacologic: Increase Gabapentin from 300 to 600 mg Po QHs. Then to 900 mg PO HQs. Tizanidine 4 mg PO BID PRN.     3. Rehabilitative: PT post injection.    4. Diagnostic: Lumbar MRI Open.     5.  Follow " up: Post Injection.       20 minutes were spent in this encounter with more than 50% of the time used for counseling and review of the plan.  Imaging / studies reviewed, detailed above.  I discussed in detail the risks, benefits, and alternatives to any and all potential treatment options.  All questions and concerns were fully addressed today in clinic. Medical decision making moderate.    Thank you for the opportunity to assist in the care of this patient.    Best wishes,    Signed:    Gunnar Almanzar MD          Disclaimer:  This note may have been prepared using voice recognition software, it may have not been extensively proofed, as such there could be errors within the text such as sound alike errors.

## 2019-03-05 NOTE — LETTER
March 5, 2019      Iraida Person MD  [address]  [place] [address]           [facility]  [address] [facility] [address]  Phone: [phone]  Fax: [phone]          Patient: Isamar Roe   MR Number: [id number]   YOB: 1935   Date of Visit: 3/5/2019       Dear Dr. Iraida Person:    Thank you for referring Isamar Roe to me for evaluation. Attached you will find relevant portions of my assessment and plan of care.    If you have questions, please do not hesitate to call me. I look forward to following Isamar Roe along with you.    Sincerely,    Gunnar Almanzar MD    Enclosure  CC:  No Recipients    If you would like to receive this communication electronically, please contact [email or web address] or [phone] to request more information on [employer] access.    For providers and/or their staff who would like to refer a patient to [facility], please contact us through our one-stop-shop provider referral line, [facility], at [phone].    If you feel you have received this communication in error or would no longer like to receive these types of communications, please e-mail [email or web address]

## 2019-03-05 NOTE — PROGRESS NOTES
Chief Pain Complaint:  Low-back Pain        History of Present Illness:   Isamar Roe is a 83 y.o. female  who is presenting with a chief complaint of Low-back Pain  . The patient began experiencing this problem abruptly, and the pain has been gradually worsening over the past 3 month(s). The pain is described as throbbing, shooting, burning and electrical and is located in the right lumbar spine. Pain is intermittent and lasts hours. The pain radiates to right leg L4 distribution. The patient rates her pain a 9 out of ten and interferes with activities of daily living a 8 out of ten. Pain is exacerbated by flexion of the lumbar spine, and is improved by rest. Patient reports no prior trauma, prior lumbar surgery  Minimally invasive left L4-L5 hemilaminotomy, foraminotomy   and resection of synovial cyst with Dr Lozada on 9/2014.     - pertinent negatives: No fever, No chills, No weight loss, No bladder dysfunction, No bowel dysfunction, No saddle anesthesia  - pertinent positives: none    - medications, other therapies tried (physical therapy, injections):     >> NSAIDs, Tylenol, gabapentin and zanaflex    >> Has previously undergone Physical Therapy    >> Has previously undergone spinal injection/s         Left Lumbar TFESI x3 in 2014 with Dr Maldonado with minimal relief     Imaging / Labs / Studies (reviewed on 3/5/2019):    Results for orders placed during the hospital encounter of 08/11/14   MRI Lumbar Spine Without Contrast    Narrative Technique: Sagittal T1, sagittal T2, sagittal STIR, axial T1, and axial T2 weighted images of the lumbar spine were obtained without contrast.    Comparison: Lumbar spine MRI 7/5/13    Findings:    There is grade I anterolisthesis of L3 on L4 and L4 on L5.  The vertebral body heights are well maintained, with no fracture.  There is no marrow signal abnormality suspicious for infiltrative process. A hemangioma is present within the T12 vertebral   body.  There is desiccation  throughout the lumbar spine most significantly at L3-4 and L4-5.  The conus is normal in appearance and terminates at the L1 level.  The adjacent soft tissue structures show no significant abnormalities.    L1-L2: There is no focal disk herniation, significant spinal canal stenosis, or neural foraminal narrowing.    L2-L3: There is no focal disk herniation, significant spinal canal stenosis, or neural foraminal narrowing.    L3-L4: Grade I anterolisthesis with uncovering of the disk, broad based disk bulge, facet arthropathy, and ligamentum flavum hypertrophy resulting in moderate spinal canal stenosis and mild bilateral neural foraminal narrowing.    L4-L5: Grade I anterolisthesis with uncovering of the disk, broad based disk bulge, facet arthropathy, and ligamentum flavum hypertrophy resulting in mild spinal canal stenosis and mild bilateral neural foraminal narrowing. A 7 mm cyst remains present   along the superior medial aspect of the left foramen previously 12 mm).    L5-S1: A 10 mm cyst remains present in the inferior aspect of the right neural foramen (previously 13 mm). A 7 mm cyst is also present in the inferior aspect of the left neural foramen. There is no focal disk herniation, significant spinal canal   stenosis, or neural foraminal narrowing.    Impression 1.  Multilevel degenerative changes of the lumbar spine as above most significantly with grade I anterolisthesis at L3-4 and L4-5, moderate spinal canal stenosis at L3-4, and mild spinal canal stenosis at L4-5.    2.  Slight interval decrease in size of the previously identified prominent cysts within the left foramen at L4-5 and bilaterally at L5-S1.  ______________________________________     Electronically signed by resident: Anna Yang  Date:     08/11/14  Time:    11:20          As the supervising and teaching physician, I personally reviewed the images and resident's interpretation and I agree with the findings.          Electronically  signed by: Dr. Aden Benz MD  Date:     08/11/14  Time:    12:37      Results for orders placed during the hospital encounter of 11/22/14   MRI Lumbar Spine W WO Cont    Narrative Exam: MRI of the lumbar spine without contrast.    History:     Low back pain. Radiculopathy.  Status post surgery in September 2014.      Comparison: Prior study dated 08/11/14.    Findings:     Postoperative changes are noted at L4 -- 5, with laminotomy on the left.  Within the left posterolateral epidural space, there is a 2-cm fluid collection, exerting mass effect on the thecal sac, causing marked central canal stenosis, and   probable left L5 neural impingement.  Differential considerations include recurrent synovial cyst as well as postoperative hematoma/seroma.  Abscess not excluded, but felt to be less likely.  Mild rim enhancement is present.  An additional smaller rim   enhancing fluid collection is noted in the deep left posterolateral soft tissues, within normal limits given surgical history.    Otherwise, no significant change from prior study.  No compression fracture identified.  Degenerative anterolisthesis of L3 on L4 again noted.    Impression      Status post left L4-5 laminotomy, with 2-cm fluid collection in the left posterolateral epidural space, causing marked central canal stenosis and probable left L5 neural impingement.  See above additional comments and differential considerations.      Electronically signed by: KOKO ALAN MD  Date:     11/22/14  Time:    14:55        Results for orders placed during the hospital encounter of 11/22/14   CT Lumbar Spine Without Contrast    Narrative Exam: CT of the lumbar spine without contrast.    History:  Back pain.    Findings: Comparison is made to prior exam dated 08/11/14.  No significant change.  Grade 1 degenerative spondylolisthesis of L3 on L4 again noted.  No compression fracture.  Recommend MR of the lumbar spine for more definitive assessment if indicated.     Impression      No compression fracture.  No significant change from prior study.      Electronically signed by: KOKO ALAN MD  Date:     11/22/14  Time:    12:25        Results for orders placed in visit on 02/07/11   X-Ray Lumbar Spine Complete 5 View    Narrative DATE OF EXAM: Feb 7 2011      [facility]   [id number]  -  L-SPINE 4 VIEWS MINIMUM:     [id number]     CLINICAL HISTORY:   724.2 0 LUMBAGO     PROCEDURE COMMENT:        ICD 9 CODE(S):   ()     CPT 4 CODE(S)/MODIFIER(S):   ()     RESULTS: THIS STUDY DEMONSTRATES NARROWING AT THE L4-5 DISC SPACE.    PEDICLES ARE INTACT.  THERE ARE MARGINAL OSTEOPHYTES PROJECTING OFF THE   END PLATES OF L4, L3, L2.  A LARGE AMOUNT OF STOOL IN THE COLON.  CLIPS   IN THE RIGHT UPPER QUADRANT.     IMPRESSION: MILD DEGENERATIVE DISC DISEASE OF THE LUMBAR SPINE.        : ESTELA  Transcribe Date/Time: Feb 7 2011 12:11P  Dictated by : DONNA LOAIZA DR  Report reviewed by:   Read On: Feb 7 2011 11:30A  DONNA LOAIZA M.D.  [id number]  Images were reviewed, findings were verified and document was   electronically  SIGNED BY: DONNA LOAIZA DR On: Feb 8 2011  4:40P        Results for orders placed during the hospital encounter of 06/16/14   X-Ray Lumbar Complete With Flex And Ext    Narrative Findings: There is generalized osteopenia and mild left convex spinal curvature.  Degenerative vertebral endplate spurring present at multiple levels with bilateral facet arthropathy inferiorly.  Mild narrowing of the L3-4 and L4-5 disk spaces with   minimal degree of grade 1 anterolisthesis at the same levels.  Pedicles grossly intact.  No compression fracture.    Impression  As above.      Electronically signed by: MARCOS PRESCOTT MD  Date:     06/16/14  Time:    09:53      Results for orders placed during the hospital encounter of 06/24/13   X-Ray Lumbar Spine AP And Lateral    Narrative Comparison: 02/07/11    Findings:    There is equivocal slight anterior subluxation L3 on L4 and L4 on  L5.  Otherwise essentially stable exam.  Osteopenia, mild scoliosis and spondylosis again noted.  No acute fracture or subluxation.  Most prominent facet arthropathy at the L3 -- 4 through   L5 -- S1 levels.  Multilevel loss of diskal height again noted.  Vascular calcifications and cholecystectomy clips noted.  Multiple calcifications lower pelvis bilaterally.    Impression       1.  Prominent spondylosis again noted with the appearance of minimal grade 1 anterolisthesis L3 on L4 and L4 on L5 is now noted.  Correlate clinically with further evaluation and/or follow-up imaging as warranted.  See above.        Electronically signed by: EDWIGE TOURE III, MD  Date:     06/24/13  Time:    11:03      Results for orders placed during the hospital encounter of 03/30/16   X-Ray Cervical Spine AP And Lateral    Narrative Three views of the cervical spine    Findings: The vertebral bodies demonstrate normal height.  The alignment is within normal limits. There is severe disc space narrowing and spondylosis noted at the C4-5 level.  There is mild/moderate disc space narrowing and spondylosis noted at the C5-6 level.  There is grade 1 spondylolisthesis of C6 on C7.  Minimal spondylosis is noted anteriorly at this level. The C1-C2 articulation is within normal limits. No prevertebral soft tissue swelling.  Generalized osteopenia is noted.    Impression  As above  ______________________________________     Electronically signed by: MAHESH CHAO D.O.  Date:     03/30/16  Time:    11:27      Review of Systems:  CONSTITUTIONAL: patient denies any fever, chills, or weight loss  SKIN: patient denies any rash or itching  RESPIRATORY: patient denies having any shortness of breath  GASTROINTESTINAL: patient denies having any diarrhea, constipation, or bowel incontinence  GENITOURINARY: patient denies having any abnormal bladder function    MUSCULOSKELETAL:  - patient complains of the above noted pain/s (see chief pain  "complaint)    NEUROLOGICAL:   - pain as above  - strength in Lower extremities is intact, BILATERALLY  - sensation in Lower extremities is intact, BILATERALLY  - patient denies any loss of bowel or bladder control      PSYCHIATRIC: patient denies any change in mood    Other:  All other systems reviewed and are negative      Physical Exam:  BP (!) 150/71 (BP Location: Left arm, Patient Position: Sitting)   Pulse 77   Ht 4' 10" (1.473 m)   Wt 61.1 kg (134 lb 11.2 oz)   BMI 28.15 kg/m²  (reviewed on 3/5/2019)  General: Alert and oriented, in no apparent distress.  Gait: normal gait.  Skin: No rashes, No discoloration, No obvious lesions  HEENT: Normocephalic, atraumatic. Pupils equal and round.  Cardiovascular: Regular rate and rhythm , no significant peripheral edema present  Respiratory: Without audible wheezing, without use of accessory muscles of respiration.    Musculoskeletal:    Lumbar Spine    - Pain on flexion of lumbar spine Present  - Straight Leg Raise:  Present    - Pain on extension of lumbar spine Absent  - TTP over the lumbar facet joints Absent  - Lumbar facet loading Absent    -Pain on palpation over the SI joint  Present on right   - RADHA: Absent      Neuro:    Strength:  LE R/L: HF: 5/5, HE: 5/5, KF: 5/5; KE: 5/5; FE: 5/5; FF: 5/5    Extremity Reflexes: Brisk and symmetric throughout.      Extremity Sensory: Sensation to pinprick and temperature symmetric. Proprioception intact.      Psych:  Mood and affect is appropriate      Assessment:    Isamar Roe is a 83 y.o. year old female who is presenting with     Encounter Diagnoses   Name Primary?    Lumbar spondylosis     Lumbar radiculopathy Yes       Plan:    1. Interventional: Schedule patient for Right L4-5, L5-S1 TFESI with local.     2. Pharmacologic: Increase Gabapentin from 300 to 600 mg Po QHs. Then to 900 mg PO HQs. Tizanidine 4 mg PO BID PRN.     3. Rehabilitative: PT post injection.    4. Diagnostic: Lumbar MRI Open.     5.  Follow " up: Post Injection.       20 minutes were spent in this encounter with more than 50% of the time used for counseling and review of the plan.  Imaging / studies reviewed, detailed above.  I discussed in detail the risks, benefits, and alternatives to any and all potential treatment options.  All questions and concerns were fully addressed today in clinic. Medical decision making moderate.    Thank you for the opportunity to assist in the care of this patient.    Best wishes,    Signed:    Gunnar Almanzar MD          Disclaimer:  This note may have been prepared using voice recognition software, it may have not been extensively proofed, as such there could be errors within the text such as sound alike errors.

## 2019-03-21 ENCOUNTER — OFFICE VISIT (OUTPATIENT)
Dept: OPHTHALMOLOGY | Facility: CLINIC | Age: 84
End: 2019-03-21
Payer: MEDICARE

## 2019-03-21 DIAGNOSIS — H25.13 NUCLEAR SCLEROSIS, BILATERAL: Primary | ICD-10-CM

## 2019-03-21 DIAGNOSIS — D31.31 CHOROIDAL NEVUS OF RIGHT EYE: ICD-10-CM

## 2019-03-21 DIAGNOSIS — H25.013 CATARACT CORTICAL, SENILE, BILATERAL: ICD-10-CM

## 2019-03-21 PROCEDURE — 99499 RISK ADDL DX/OHS AUDIT: ICD-10-PCS | Mod: HCNC,S$GLB,, | Performed by: OPHTHALMOLOGY

## 2019-03-21 PROCEDURE — 99499 UNLISTED E&M SERVICE: CPT | Mod: HCNC,S$GLB,, | Performed by: OPHTHALMOLOGY

## 2019-03-21 PROCEDURE — 92014 PR EYE EXAM, EST PATIENT,COMPREHESV: ICD-10-PCS | Mod: HCNC,S$GLB,, | Performed by: OPHTHALMOLOGY

## 2019-03-21 PROCEDURE — 99999 PR PBB SHADOW E&M-EST. PATIENT-LVL II: ICD-10-PCS | Mod: PBBFAC,HCNC,, | Performed by: OPHTHALMOLOGY

## 2019-03-21 PROCEDURE — 99999 PR PBB SHADOW E&M-EST. PATIENT-LVL II: CPT | Mod: PBBFAC,HCNC,, | Performed by: OPHTHALMOLOGY

## 2019-03-21 PROCEDURE — 92014 COMPRE OPH EXAM EST PT 1/>: CPT | Mod: HCNC,S$GLB,, | Performed by: OPHTHALMOLOGY

## 2019-03-21 RX ORDER — PREDNISOLONE ACETATE 10 MG/ML
1 SUSPENSION/ DROPS OPHTHALMIC 4 TIMES DAILY
Qty: 1 BOTTLE | Refills: 2 | Status: SHIPPED | OUTPATIENT
Start: 2019-03-21 | End: 2019-03-22 | Stop reason: SDUPTHER

## 2019-03-21 RX ORDER — GATIFLOXACIN 5 MG/ML
1 SOLUTION/ DROPS OPHTHALMIC 2 TIMES DAILY
Qty: 1 BOTTLE | Refills: 2 | Status: SHIPPED | OUTPATIENT
Start: 2019-03-21 | End: 2019-05-09

## 2019-03-21 RX ORDER — KETOROLAC TROMETHAMINE 5 MG/ML
1 SOLUTION OPHTHALMIC 4 TIMES DAILY
Qty: 1 BOTTLE | Refills: 2 | Status: SHIPPED | OUTPATIENT
Start: 2019-03-21 | End: 2019-06-13

## 2019-03-21 NOTE — PROGRESS NOTES
SUBJECTIVE:   Isamar Roe is a 83 y.o. female   Corrected distance visual acuity was 20/40 in the right eye and 20/40 in the left eye.   Chief Complaint   Patient presents with    Cataract        HPI:  HPI       Patient c/o blurry vision when reading, patient has not driven for approx.   2 months due to hip pain, patient is a scl wearer but has not worn for 2   months approx.      Ref[place] by [facility] [place]19  1. Cataracts OU  2. Choroidal nevus od  3. Dermato ou    SCL wear monovision 45 years    Last edited by TISHA Batista on 3/21/2019  2:18 PM. (History)        Assessment /Plan :  1. Nuclear sclerosis, bilateral  Visually Significant Cataract OS > OD:   Patient reports decreased vision consistent with the clinical amount of lenticular opacity,  which reaches the level of visual significance and affects activities of daily living such as  read. Risks, benefits, and alternatives to cataract surgery were  discussed.  IOL options were discussed, including Premium IOL'S and the associated  side effects and additional patient cost associated with them as well as patient's visual  goals. The pt expressed a desire to proceed with surgery with the potential for some  reasonable degree of visual improvement and was consented.  Risks of loss of vision and eye reviewed as well as possibility of need for spectacle correction after surgery even with premium implants. Verbal and written preop  instructions were provided to the patient.       Pt is interested in traditional monofocal IOL aiming for:   Monovision and understands that depth perception and higher quality vision will generally be poorer unless corrective glassses are worn.      Final visual acuity may be limited by astigmatism    Pt wishes to have Phaco/IOL  OS     Requests a Monofocal IOL.    Will aim for near (-1.75 to -2.00)    Other considerations: None     2. Cataract cortical, senile, bilateral as above   3. 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

## 2019-03-22 ENCOUNTER — TELEPHONE (OUTPATIENT)
Dept: OPHTHALMOLOGY | Facility: CLINIC | Age: 84
End: 2019-03-22

## 2019-03-22 DIAGNOSIS — H25.13 NUCLEAR SCLEROSIS, BILATERAL: ICD-10-CM

## 2019-03-22 DIAGNOSIS — H25.013 CATARACT CORTICAL, SENILE, BILATERAL: ICD-10-CM

## 2019-03-22 RX ORDER — PREDNISOLONE ACETATE 10 MG/ML
1 SUSPENSION/ DROPS OPHTHALMIC 4 TIMES DAILY
Qty: 5 ML | Refills: 2 | Status: SHIPPED | OUTPATIENT
Start: 2019-03-22 | End: 2019-06-13

## 2019-03-22 NOTE — TELEPHONE ENCOUNTER
Spoke with pharmacistJude.  They don't have pred acetate or pred phosphate, and have been unable to get it from other pharmacys in their area.

## 2019-03-22 NOTE — TELEPHONE ENCOUNTER
----- Message from Annabel Albright sent at 3/22/2019 12:11 PM CDT -----  Contact: patient  I called [facility], and spoke with Jina who confirmed that they do have Pred Forte.  Advised Jina that we will send rx for Pred and patient will  on 4/3 .  Please re-issue rx for Pred Forte to [facility] pharmacy.  I've already spoken with patient and she will  on 4/3/19.   Thanks

## 2019-03-22 NOTE — TELEPHONE ENCOUNTER
Spoke to patient and advised that [facility] pharmacy has Pred Forte in stock and will re-issue rx for her to  on 4/3/19.

## 2019-03-22 NOTE — TELEPHONE ENCOUNTER
----- Message from Richelle Cat sent at 3/22/2019  9:18 AM CDT -----  Contact: Jude/Jenny Russell [phone]   Type:  Pharmacy Calling to Clarify an RX     Name of Caller:Jude   Pharmacy Name:[facility]   Prescription Name:pred forte 1%   What do they need to clarify?:   Best Call Back Number:[phone]   Additional Information: states that the medication is not available and needs to get another medication to replace.

## 2019-04-03 ENCOUNTER — HOSPITAL ENCOUNTER (OUTPATIENT)
Facility: HOSPITAL | Age: 84
Discharge: HOME | End: 2019-04-03
Attending: ANESTHESIOLOGY | Admitting: ANESTHESIOLOGY
Payer: MEDICARE

## 2019-04-03 VITALS
WEIGHT: 134 LBS | SYSTOLIC BLOOD PRESSURE: 170 MMHG | BODY MASS INDEX: 28.13 KG/M2 | HEART RATE: 82 BPM | DIASTOLIC BLOOD PRESSURE: 90 MMHG | RESPIRATION RATE: 16 BRPM | HEIGHT: 58 IN | OXYGEN SATURATION: 98 %

## 2019-04-03 DIAGNOSIS — M47.816 LUMBAR SPONDYLOSIS: ICD-10-CM

## 2019-04-03 DIAGNOSIS — M54.16 LUMBAR RADICULOPATHY: Primary | ICD-10-CM

## 2019-04-03 PROCEDURE — 64484 NJX AA&/STRD TFRM EPI L/S EA: CPT | Mod: HCNC,RT,, | Performed by: ANESTHESIOLOGY

## 2019-04-03 PROCEDURE — 25000003 PHARM REV CODE 250: Mod: HCNC

## 2019-04-03 PROCEDURE — 64483 NJX AA&/STRD TFRM EPI L/S 1: CPT | Mod: HCNC,RT,, | Performed by: ANESTHESIOLOGY

## 2019-04-03 PROCEDURE — 64484 PRA INJECT ANES/STEROID FORAMEN LUMBAR/SACRAL W IMG GUIDE ,EA ADD LEVEL: ICD-10-PCS | Mod: HCNC,RT,, | Performed by: ANESTHESIOLOGY

## 2019-04-03 PROCEDURE — 64483 PR EPIDURAL INJ, ANES/STEROID, TRANSFORAMINAL, LUMB/SACR, SNGL LEVL: ICD-10-PCS | Mod: HCNC,RT,, | Performed by: ANESTHESIOLOGY

## 2019-04-03 PROCEDURE — 63600175 PHARM REV CODE 636 W HCPCS: Mod: HCNC

## 2019-04-03 RX ORDER — LIDOCAINE HYDROCHLORIDE 10 MG/ML
INJECTION, SOLUTION EPIDURAL; INFILTRATION; INTRACAUDAL; PERINEURAL
Status: DISCONTINUED | OUTPATIENT
Start: 2019-04-03 | End: 2019-04-03 | Stop reason: HOSPADM

## 2019-04-03 RX ORDER — DEXAMETHASONE SODIUM PHOSPHATE 10 MG/ML
INJECTION INTRAMUSCULAR; INTRAVENOUS
Status: DISCONTINUED | OUTPATIENT
Start: 2019-04-03 | End: 2019-04-03 | Stop reason: HOSPADM

## 2019-04-03 NOTE — OP NOTE
"Procedure: Lumbar Transforaminal Epidural Steroid Injection under Fluoroscopic Guidance (supraneural approach)    Level: L4/5 L5/S1    Side: Right    PROCEDURE DATE: 4/3/2019    Pre-operative Diagnosis: Lumbar Radiculopathy  Post-operative Diagnosis: Lumbar Radiculopathy    Provider: Gunnar Almanzar MD  Assistant(s): None    Anesthesia: Local, No Sedation    >> 0 mg of VERSED    >> 0 mcg of FENTANYL     Indication: Low back pain with radiculopathy consistent with distribution of targeted nerve. Symptoms unresponsive to conservative treatments. Fluoroscopy was used to optimize visualization of needle placement and to maximize safety.     Procedure Description / Technique:  The patient was seen and identified in the preoperative area. Risks, benefits, complications, and alternatives were discussed with the patient. The patient agreed to proceed with the procedure and signed the consent. The site and side of the procedure was identified and marked. An IV was not placed for this procedure. The patient was taken to the procedural suite.    The patient was positioned in prone orientation on procedure table and a pillow was placed under the abdomen to reduce lumbar lordosis. A time out was performed prior to any intervention. The procedure, site, side, and allergies were stated and agreed to by all present. The lumbosacral area was widely prepped with ChloraPrep. The procedural site was draped in usual sterile fashion. Vital signs were closely monitored throughout this procedure. Conscious sedation was not used for this procedure.    The target area was visualized under fluoroscopy. The cephalocaudal angle of the fluoroscope was adjusted as to align the vertebral end plates. The fluoroscopic arm was rotated ipsilaterally to an angle of approximately 30 degrees until the "phylicia dog" outline came into view and the tip of the inferior superior articular process pointed towards the midline, 6:00 position of the above pedicle. A " "Epimed 22 Ga Blunt Coude needle was directed towards the "chin" of the "phylicia dog" (adjacent to the pars interarticularis and inferior to the pedicle). The needle was advanced until OS was met at the inferior border of the pedicle / pars interface. The needle was adjusted so that it would pass inferior to the osseous border. The fluoroscope was then placed in the lateral position and the needle was slowly advanced until it rested in the posterior 1/3rd of the vertebral foramen. AP fluoroscopy was checked and the needle tip rested at the 6:00 position under the pedicle. No paresthesia was elicited during needle placement. With the needle tip in its final position, gentle aspiration was negative for blood and CSF. Omnipaque 240 (1 to 2 mL) was injected under live fluoroscopy. Microbore tubing was used for injection. There was no pain or paresthesia on injection. The contrast clearly delineated the targeted nerve root on AP fluoroscopy. No vascular uptake was seen. A solution containing 2 mL of 1% PF Lidocaine and 2 mL of Dexamethasone (10 mg/mL) was mixed and 2 mL was injected slowly at each level targeted. There was minimal resistance on injection. No pain or paresthesia was elicited on injection. The stylet was replaced and the needle was withdrawn intact. This procedure was performed for each of the above indicated levels.     Description of Findings: Not applicable    Prosthetic devices, grafts, tissues, or devices implanted: None    Specimen Removed: No    Estimated Blood Loss: minimal    COMPLICATIONS: None    DISPOSITION / PLANS: The patient was transferred to the recovery area in a stable condition for observation. The patient was reexamined prior to discharge. There was no evidence of acute neurologic injury following the procedure.  Patient was discharged from the recovery room after meeting discharge criteria. Home discharge instructions were given to the patient by the staff.  "

## 2019-04-03 NOTE — DISCHARGE SUMMARY
[facility]  Discharge Note       Description of Procedure: Right L4-5, L5-S1 Lumbar Transforaminal Epidural Steroid Injection under Fluoroscopic Guidance    Procedure Date: 4/3/2019    Admit Date: 4/3/2019  Discharge Date: 4/3/2019     Attending Physician: Jenelle Maher   Discharge Provider: Jenelle Maher    Preoperative Diagnosis: Lumbar Spondylosis, Lumbar Radiculopathy     Postoperative Diagnosis: as above, same as preoperative diagnosis    Discharged Condition: Stable    Hospital Course: Patient was admitted for an outpatient procedure. The procedure was tolerated well with no complications.    Final Diagnoses: Same as principal problem.    Disposition: Home, self-care.    Follow up/Patient Instructions:  Follow-up in clinic in 2-3 weeks.    Medications: No medications were prescribed today. The patient was advised to resume normal medication regimen without change.  Specific information was provided regarding restarting any anticoagulant/s.    Discharge Procedure Orders (must include Diet, Follow-up, Activity):  Light activity for the remainder of the day, resume normal activity tomorrow. Resume normal diet. Follow-up in clinic in 2-3 weeks.

## 2019-04-03 NOTE — PLAN OF CARE
Problem: Adult Inpatient Plan of Care  Goal: Plan of Care Review  Outcome: Outcome(s) achieved Date Met: 04/03/19  Patient d/c home in stable condition via wheelchair with ride. Verbalized understanding of d/c instructions. Patient voiced no complaints at this time. Patient stood at side of bed, walked steps with no new motor deficits. Neurologically intact.

## 2019-04-12 ENCOUNTER — TELEPHONE (OUTPATIENT)
Dept: PAIN MEDICINE | Facility: CLINIC | Age: 84
End: 2019-04-12

## 2019-04-12 NOTE — TELEPHONE ENCOUNTER
Contacted pt. Pt requesting work in appt with . Offered pt appt   Date: 4/15/2019 Status: [facility]   Appt Time: 9:00 AM       . Pt gladly accepted. All questions answered.//lp

## 2019-04-12 NOTE — TELEPHONE ENCOUNTER
----- Message from Buffy Garibay sent at 4/12/2019 10:07 AM CDT -----  Contact: pt   Call pt regarding being in a lot of pain and need to see the doctor next week.    [phone]

## 2019-04-15 ENCOUNTER — OFFICE VISIT (OUTPATIENT)
Dept: PAIN MEDICINE | Facility: CLINIC | Age: 84
End: 2019-04-15
Payer: MEDICARE

## 2019-04-15 VITALS
HEART RATE: 80 BPM | DIASTOLIC BLOOD PRESSURE: 65 MMHG | BODY MASS INDEX: 28.23 KG/M2 | HEIGHT: 58 IN | WEIGHT: 134.5 LBS | SYSTOLIC BLOOD PRESSURE: 133 MMHG

## 2019-04-15 DIAGNOSIS — M53.3 SACROILIAC JOINT DYSFUNCTION: Primary | ICD-10-CM

## 2019-04-15 PROCEDURE — 3078F PR MOST RECENT DIASTOLIC BLOOD PRESSURE < 80 MM HG: ICD-10-PCS | Mod: HCNC,CPTII,S$GLB, | Performed by: ANESTHESIOLOGY

## 2019-04-15 PROCEDURE — 99999 PR PBB SHADOW E&M-EST. PATIENT-LVL III: ICD-10-PCS | Mod: PBBFAC,HCNC,, | Performed by: ANESTHESIOLOGY

## 2019-04-15 PROCEDURE — 3075F SYST BP GE 130 - 139MM HG: CPT | Mod: HCNC,CPTII,S$GLB, | Performed by: ANESTHESIOLOGY

## 2019-04-15 PROCEDURE — 3075F PR MOST RECENT SYSTOLIC BLOOD PRESS GE 130-139MM HG: ICD-10-PCS | Mod: HCNC,CPTII,S$GLB, | Performed by: ANESTHESIOLOGY

## 2019-04-15 PROCEDURE — 3078F DIAST BP <80 MM HG: CPT | Mod: HCNC,CPTII,S$GLB, | Performed by: ANESTHESIOLOGY

## 2019-04-15 PROCEDURE — 1101F PR PT FALLS ASSESS DOC 0-1 FALLS W/OUT INJ PAST YR: ICD-10-PCS | Mod: HCNC,CPTII,S$GLB, | Performed by: ANESTHESIOLOGY

## 2019-04-15 PROCEDURE — 99214 PR OFFICE/OUTPT VISIT, EST, LEVL IV, 30-39 MIN: ICD-10-PCS | Mod: HCNC,S$GLB,, | Performed by: ANESTHESIOLOGY

## 2019-04-15 PROCEDURE — 99999 PR PBB SHADOW E&M-EST. PATIENT-LVL III: CPT | Mod: PBBFAC,HCNC,, | Performed by: ANESTHESIOLOGY

## 2019-04-15 PROCEDURE — 1101F PT FALLS ASSESS-DOCD LE1/YR: CPT | Mod: HCNC,CPTII,S$GLB, | Performed by: ANESTHESIOLOGY

## 2019-04-15 PROCEDURE — 99214 OFFICE O/P EST MOD 30 MIN: CPT | Mod: HCNC,S$GLB,, | Performed by: ANESTHESIOLOGY

## 2019-04-15 NOTE — PROGRESS NOTES
Chief Pain Complaint:  Back Pain        History of Present Illness:   Isamar Roe is a 84 y.o. female  who is presenting with a chief complaint of Back Pain  . The patient began experiencing this problem abruptly, and the pain has been gradually worsening over the past 3 month(s). The pain is described as throbbing, shooting, burning and electrical and is located in the right lumbar spine. Pain is intermittent and lasts hours. The pain radiates to right leg L4 distribution. The patient rates her pain a 9 out of ten and interferes with activities of daily living a 8 out of ten. Pain is exacerbated by flexion of the lumbar spine, and is improved by rest. Patient reports no prior trauma, prior lumbar surgery  Minimally invasive left L4-L5 hemilaminotomy, foraminotomy   and resection of synovial cyst with Dr Lozada on 9/2014.     - pertinent negatives: No fever, No chills, No weight loss, No bladder dysfunction, No bowel dysfunction, No saddle anesthesia  - pertinent positives: none    - medications, other therapies tried (physical therapy, injections):     >> NSAIDs, Tylenol, gabapentin and zanaflex    >> Has previously undergone Physical Therapy    >> Has previously undergone spinal injection/s         Left Lumbar TFESI x3 in 2014 with Dr Maldonado with minimal relief          Right L4-5, L5-S1 TFESI on 4/319 with 70% relief of leg pain. Right Buttock pain still resists.     Imaging / Labs / Studies (reviewed on 4/15/2019):    Results for orders placed during the hospital encounter of 08/11/14   MRI Lumbar Spine Without Contrast    Narrative Technique: Sagittal T1, sagittal T2, sagittal STIR, axial T1, and axial T2 weighted images of the lumbar spine were obtained without contrast.    Comparison: Lumbar spine MRI 7/5/13    Findings:    There is grade I anterolisthesis of L3 on L4 and L4 on L5.  The vertebral body heights are well maintained, with no fracture.  There is no marrow signal abnormality suspicious for  infiltrative process. A hemangioma is present within the T12 vertebral   body.  There is desiccation throughout the lumbar spine most significantly at L3-4 and L4-5.  The conus is normal in appearance and terminates at the L1 level.  The adjacent soft tissue structures show no significant abnormalities.    L1-L2: There is no focal disk herniation, significant spinal canal stenosis, or neural foraminal narrowing.    L2-L3: There is no focal disk herniation, significant spinal canal stenosis, or neural foraminal narrowing.    L3-L4: Grade I anterolisthesis with uncovering of the disk, broad based disk bulge, facet arthropathy, and ligamentum flavum hypertrophy resulting in moderate spinal canal stenosis and mild bilateral neural foraminal narrowing.    L4-L5: Grade I anterolisthesis with uncovering of the disk, broad based disk bulge, facet arthropathy, and ligamentum flavum hypertrophy resulting in mild spinal canal stenosis and mild bilateral neural foraminal narrowing. A 7 mm cyst remains present   along the superior medial aspect of the left foramen previously 12 mm).    L5-S1: A 10 mm cyst remains present in the inferior aspect of the right neural foramen (previously 13 mm). A 7 mm cyst is also present in the inferior aspect of the left neural foramen. There is no focal disk herniation, significant spinal canal   stenosis, or neural foraminal narrowing.    Impression 1.  Multilevel degenerative changes of the lumbar spine as above most significantly with grade I anterolisthesis at L3-4 and L4-5, moderate spinal canal stenosis at L3-4, and mild spinal canal stenosis at L4-5.    2.  Slight interval decrease in size of the previously identified prominent cysts within the left foramen at L4-5 and bilaterally at L5-S1.  ______________________________________     Electronically signed by resident: Anna Yang  Date:     08/11/14  Time:    11:20          As the supervising and teaching physician, I personally  reviewed the images and resident's interpretation and I agree with the findings.          Electronically signed by: Dr. Aden Benz MD  Date:     08/11/14  Time:    12:37      Results for orders placed during the hospital encounter of 11/22/14   MRI Lumbar Spine W WO Cont    Narrative Exam: MRI of the lumbar spine without contrast.    History:     Low back pain. Radiculopathy.  Status post surgery in September 2014.      Comparison: Prior study dated 08/11/14.    Findings:     Postoperative changes are noted at L4 -- 5, with laminotomy on the left.  Within the left posterolateral epidural space, there is a 2-cm fluid collection, exerting mass effect on the thecal sac, causing marked central canal stenosis, and   probable left L5 neural impingement.  Differential considerations include recurrent synovial cyst as well as postoperative hematoma/seroma.  Abscess not excluded, but felt to be less likely.  Mild rim enhancement is present.  An additional smaller rim   enhancing fluid collection is noted in the deep left posterolateral soft tissues, within normal limits given surgical history.    Otherwise, no significant change from prior study.  No compression fracture identified.  Degenerative anterolisthesis of L3 on L4 again noted.    Impression      Status post left L4-5 laminotomy, with 2-cm fluid collection in the left posterolateral epidural space, causing marked central canal stenosis and probable left L5 neural impingement.  See above additional comments and differential considerations.      Electronically signed by: KOKO ALAN MD  Date:     11/22/14  Time:    14:55        Results for orders placed during the hospital encounter of 11/22/14   CT Lumbar Spine Without Contrast    Narrative Exam: CT of the lumbar spine without contrast.    History:  Back pain.    Findings: Comparison is made to prior exam dated 08/11/14.  No significant change.  Grade 1 degenerative spondylolisthesis of L3 on L4 again noted.   No compression fracture.  Recommend MR of the lumbar spine for more definitive assessment if indicated.    Impression      No compression fracture.  No significant change from prior study.      Electronically signed by: KOKO ALAN MD  Date:     11/22/14  Time:    12:25        Results for orders placed in visit on 02/07/11   X-Ray Lumbar Spine Complete 5 View    Narrative DATE OF EXAM: Feb 7 2011      [facility]   0144  -  L-SPINE 4 VIEWS MINIMUM:     [id number]     CLINICAL HISTORY:   724.2 0 LUMBAGO     PROCEDURE COMMENT:        ICD 9 CODE(S):   ()     CPT 4 CODE(S)/MODIFIER(S):   ()     RESULTS: THIS STUDY DEMONSTRATES NARROWING AT THE L4-5 DISC SPACE.    PEDICLES ARE INTACT.  THERE ARE MARGINAL OSTEOPHYTES PROJECTING OFF THE   END PLATES OF L4, L3, L2.  A LARGE AMOUNT OF STOOL IN THE COLON.  CLIPS   IN THE RIGHT UPPER QUADRANT.     IMPRESSION: MILD DEGENERATIVE DISC DISEASE OF THE LUMBAR SPINE.        : ESTELA  Transcribe Date/Time: Feb 7 2011 12:11P  Dictated by : DONNA LOAIZA DR  Report reviewed by:   Read On: Feb 7 2011 11:30A  DONNA LOAIZA M.D.  [id number]  Images were reviewed, findings were verified and document was   electronically  SIGNED BY: DONNA LOAIZA DR On: Feb 8 2011  4:40P        Results for orders placed during the hospital encounter of 06/16/14   X-Ray Lumbar Complete With Flex And Ext    Narrative Findings: There is generalized osteopenia and mild left convex spinal curvature.  Degenerative vertebral endplate spurring present at multiple levels with bilateral facet arthropathy inferiorly.  Mild narrowing of the L3-4 and L4-5 disk spaces with   minimal degree of grade 1 anterolisthesis at the same levels.  Pedicles grossly intact.  No compression fracture.    Impression  As above.      Electronically signed by: MARCOS PRESCOTT MD  Date:     06/16/14  Time:    09:53      Results for orders placed during the hospital encounter of 06/24/13   X-Ray Lumbar Spine AP And Lateral     Narrative Comparison: 02/07/11    Findings:    There is equivocal slight anterior subluxation L3 on L4 and L4 on L5.  Otherwise essentially stable exam.  Osteopenia, mild scoliosis and spondylosis again noted.  No acute fracture or subluxation.  Most prominent facet arthropathy at the L3 -- 4 through   L5 -- S1 levels.  Multilevel loss of diskal height again noted.  Vascular calcifications and cholecystectomy clips noted.  Multiple calcifications lower pelvis bilaterally.    Impression       1.  Prominent spondylosis again noted with the appearance of minimal grade 1 anterolisthesis L3 on L4 and L4 on L5 is now noted.  Correlate clinically with further evaluation and/or follow-up imaging as warranted.  See above.        Electronically signed by: EDWIGE TOURE III, MD  Date:     06/24/13  Time:    11:03      Results for orders placed during the hospital encounter of 03/30/16   X-Ray Cervical Spine AP And Lateral    Narrative Three views of the cervical spine    Findings: The vertebral bodies demonstrate normal height.  The alignment is within normal limits. There is severe disc space narrowing and spondylosis noted at the C4-5 level.  There is mild/moderate disc space narrowing and spondylosis noted at the C5-6 level.  There is grade 1 spondylolisthesis of C6 on C7.  Minimal spondylosis is noted anteriorly at this level. The C1-C2 articulation is within normal limits. No prevertebral soft tissue swelling.  Generalized osteopenia is noted.    Impression  As above  ______________________________________     Electronically signed by: MAHESH CHAO D.O.  Date:     03/30/16  Time:    11:27      Review of Systems:  CONSTITUTIONAL: patient denies any fever, chills, or weight loss  SKIN: patient denies any rash or itching  RESPIRATORY: patient denies having any shortness of breath  GASTROINTESTINAL: patient denies having any diarrhea, constipation, or bowel incontinence  GENITOURINARY: patient denies having any abnormal  "bladder function    MUSCULOSKELETAL:  - patient complains of the above noted pain/s (see chief pain complaint)    NEUROLOGICAL:   - pain as above  - strength in Lower extremities is intact, BILATERALLY  - sensation in Lower extremities is intact, BILATERALLY  - patient denies any loss of bowel or bladder control      PSYCHIATRIC: patient denies any change in mood    Other:  All other systems reviewed and are negative      Physical Exam:  /65 (BP Location: Right arm, Patient Position: Sitting)   Pulse 80   Ht 4' 10" (1.473 m)   Wt 61 kg (134 lb 7.7 oz)   BMI 28.11 kg/m²  (reviewed on 4/15/2019)  General: Alert and oriented, in no apparent distress.  Gait: normal gait.  Skin: No rashes, No discoloration, No obvious lesions  HEENT: Normocephalic, atraumatic. Pupils equal and round.  Cardiovascular: Regular rate and rhythm , no significant peripheral edema present  Respiratory: Without audible wheezing, without use of accessory muscles of respiration.    Musculoskeletal:    Lumbar Spine    - Pain on flexion of lumbar spine Present  - Straight Leg Raise:  Present    - Pain on extension of lumbar spine Absent  - TTP over the lumbar facet joints Absent  - Lumbar facet loading Absent    -Pain on palpation over the SI joint  Present on right   - RADHA: Present       Neuro:    Strength:  LE R/L: HF: 5/5, HE: 5/5, KF: 5/5; KE: 5/5; FE: 5/5; FF: 5/5    Extremity Reflexes: Brisk and symmetric throughout.      Extremity Sensory: Sensation to pinprick and temperature symmetric. Proprioception intact.      Psych:  Mood and affect is appropriate      Assessment:    Isamar Roe is a 84 y.o. year old female who is presenting with     Encounter Diagnosis   Name Primary?    Sacroiliac joint dysfunction Yes       Plan:    1. Interventional: Schedule patient for Right SI joint and Right GTB.    Patient s/p Right L4-5, L5-S1 TFESI on 4/319 with 70% relief of leg pain. Right Buttock pain still resists.     2. Pharmacologic: " Increase Gabapentin from 300 to 600 mg Po QHs. Then to 900 mg PO HQs. Tizanidine 4 mg PO BID PRN.     3. Rehabilitative: PT post injection.    4. Diagnostic: Lumbar MRI reviewed with patient.     5.  Follow up: Post Injection.       20 minutes were spent in this encounter with more than 50% of the time used for counseling and review of the plan.  Imaging / studies reviewed, detailed above.  I discussed in detail the risks, benefits, and alternatives to any and all potential treatment options.  All questions and concerns were fully addressed today in clinic. Medical decision making moderate.    Thank you for the opportunity to assist in the care of this patient.    Best wishes,    Signed:    Gunnar Almanzar MD          Disclaimer:  This note may have been prepared using voice recognition software, it may have not been extensively proofed, as such there could be errors within the text such as sound alike errors.

## 2019-04-16 ENCOUNTER — TELEPHONE (OUTPATIENT)
Dept: PAIN MEDICINE | Facility: CLINIC | Age: 84
End: 2019-04-16

## 2019-04-16 DIAGNOSIS — M47.26 OSTEOARTHRITIS OF SPINE WITH RADICULOPATHY, LUMBAR REGION: ICD-10-CM

## 2019-04-16 RX ORDER — GABAPENTIN 300 MG/1
300 CAPSULE ORAL 2 TIMES DAILY
Qty: 60 CAPSULE | Refills: 1 | Status: SHIPPED | OUTPATIENT
Start: 2019-04-16 | End: 2019-05-16

## 2019-04-16 NOTE — TELEPHONE ENCOUNTER
----- Message from Belkis Ge sent at 4/16/2019  9:00 AM CDT -----  Contact: self   Type:  RX Refill Request    Who Called: patietn  Refill or New Rx:refill  RX Name and Strength:gabapentin (NEURONTIN) 300 MG capsule  How is the patient currently taking it? (ex. 1XDay):2xday  Is this a 30 day or 90 day RX:30 day  Preferred Pharmacy with phone number:  [facility] - [facility] [address] [facility]  [address] [place] [address]  [address] [address]  Phone: [phone] Fax: [phone]  Local or Mail Order:local  Ordering Provider:Dr. Almanzar  Would the patient rather a call back or a response via [facility]? call  Best Call Back Number:[phone]  Additional Information:

## 2019-04-30 ENCOUNTER — OFFICE VISIT (OUTPATIENT)
Dept: INTERNAL MEDICINE | Facility: CLINIC | Age: 84
End: 2019-04-30
Payer: MEDICARE

## 2019-04-30 VITALS
OXYGEN SATURATION: 96 % | SYSTOLIC BLOOD PRESSURE: 138 MMHG | BODY MASS INDEX: 28.78 KG/M2 | HEIGHT: 58 IN | DIASTOLIC BLOOD PRESSURE: 76 MMHG | TEMPERATURE: 97 F | WEIGHT: 137.13 LBS | HEART RATE: 91 BPM

## 2019-04-30 DIAGNOSIS — R30.0 DYSURIA: Primary | ICD-10-CM

## 2019-04-30 DIAGNOSIS — M54.16 LUMBAR RADICULOPATHY, RIGHT: ICD-10-CM

## 2019-04-30 DIAGNOSIS — N30.01 ACUTE CYSTITIS WITH HEMATURIA: ICD-10-CM

## 2019-04-30 LAB
BACTERIA #/AREA URNS HPF: ABNORMAL /HPF
BILIRUB UR QL STRIP: NEGATIVE
CLARITY UR: ABNORMAL
COLOR UR: YELLOW
GLUCOSE UR QL STRIP: NEGATIVE
HGB UR QL STRIP: ABNORMAL
HYALINE CASTS #/AREA URNS LPF: ABNORMAL /LPF
KETONES UR QL STRIP: NEGATIVE
LEUKOCYTE ESTERASE UR QL STRIP: ABNORMAL
MICROSCOPIC COMMENT: ABNORMAL
NITRITE UR QL STRIP: POSITIVE
NON-SQ EPI CELLS #/AREA URNS HPF: 3 /HPF
PH UR STRIP: 6 [PH] (ref 5–8)
PROT UR QL STRIP: ABNORMAL
RBC #/AREA URNS HPF: 40 /HPF (ref 0–4)
SP GR UR STRIP: 1.01 (ref 1–1.03)
SQUAMOUS #/AREA URNS HPF: 1 /HPF
URN SPEC COLLECT METH UR: ABNORMAL
WBC #/AREA URNS HPF: >100 /HPF (ref 0–5)

## 2019-04-30 PROCEDURE — 3075F PR MOST RECENT SYSTOLIC BLOOD PRESS GE 130-139MM HG: ICD-10-PCS | Mod: HCNC,CPTII,S$GLB, | Performed by: FAMILY MEDICINE

## 2019-04-30 PROCEDURE — 87086 URINE CULTURE/COLONY COUNT: CPT | Mod: HCNC

## 2019-04-30 PROCEDURE — 99213 OFFICE O/P EST LOW 20 MIN: CPT | Mod: HCNC,S$GLB,, | Performed by: FAMILY MEDICINE

## 2019-04-30 PROCEDURE — 3075F SYST BP GE 130 - 139MM HG: CPT | Mod: HCNC,CPTII,S$GLB, | Performed by: FAMILY MEDICINE

## 2019-04-30 PROCEDURE — 81000 URINALYSIS NONAUTO W/SCOPE: CPT | Mod: HCNC

## 2019-04-30 PROCEDURE — 87186 SC STD MICRODIL/AGAR DIL: CPT | Mod: HCNC

## 2019-04-30 PROCEDURE — 3078F DIAST BP <80 MM HG: CPT | Mod: HCNC,CPTII,S$GLB, | Performed by: FAMILY MEDICINE

## 2019-04-30 PROCEDURE — 1101F PT FALLS ASSESS-DOCD LE1/YR: CPT | Mod: HCNC,CPTII,S$GLB, | Performed by: FAMILY MEDICINE

## 2019-04-30 PROCEDURE — 87077 CULTURE AEROBIC IDENTIFY: CPT | Mod: HCNC

## 2019-04-30 PROCEDURE — 87088 URINE BACTERIA CULTURE: CPT | Mod: HCNC

## 2019-04-30 PROCEDURE — 1101F PR PT FALLS ASSESS DOC 0-1 FALLS W/OUT INJ PAST YR: ICD-10-PCS | Mod: HCNC,CPTII,S$GLB, | Performed by: FAMILY MEDICINE

## 2019-04-30 PROCEDURE — 99999 PR PBB SHADOW E&M-EST. PATIENT-LVL III: ICD-10-PCS | Mod: PBBFAC,HCNC,, | Performed by: FAMILY MEDICINE

## 2019-04-30 PROCEDURE — 99213 PR OFFICE/OUTPT VISIT, EST, LEVL III, 20-29 MIN: ICD-10-PCS | Mod: HCNC,S$GLB,, | Performed by: FAMILY MEDICINE

## 2019-04-30 PROCEDURE — 3078F PR MOST RECENT DIASTOLIC BLOOD PRESSURE < 80 MM HG: ICD-10-PCS | Mod: HCNC,CPTII,S$GLB, | Performed by: FAMILY MEDICINE

## 2019-04-30 PROCEDURE — 99999 PR PBB SHADOW E&M-EST. PATIENT-LVL III: CPT | Mod: PBBFAC,HCNC,, | Performed by: FAMILY MEDICINE

## 2019-04-30 RX ORDER — CIPROFLOXACIN 500 MG/1
500 TABLET ORAL 2 TIMES DAILY
Qty: 14 TABLET | Refills: 0 | Status: SHIPPED | OUTPATIENT
Start: 2019-04-30 | End: 2019-05-13

## 2019-05-01 ENCOUNTER — PES CALL (OUTPATIENT)
Dept: ADMINISTRATIVE | Facility: CLINIC | Age: 84
End: 2019-05-01

## 2019-05-01 ENCOUNTER — OUTSIDE PLACE OF SERVICE (OUTPATIENT)
Dept: ADMINISTRATIVE | Facility: OTHER | Age: 84
End: 2019-05-01
Payer: MEDICARE

## 2019-05-01 PROCEDURE — 66984 XCAPSL CTRC RMVL W/O ECP: CPT | Mod: LT,,, | Performed by: OPHTHALMOLOGY

## 2019-05-01 PROCEDURE — 66984 PR REMOVAL, CATARACT, W/INSRT INTRAOC LENS, W/O ENDO CYCLO: ICD-10-PCS | Mod: LT,,, | Performed by: OPHTHALMOLOGY

## 2019-05-02 ENCOUNTER — OFFICE VISIT (OUTPATIENT)
Dept: OPHTHALMOLOGY | Facility: CLINIC | Age: 84
End: 2019-05-02
Payer: MEDICARE

## 2019-05-02 DIAGNOSIS — Z98.890 POST-OPERATIVE STATE: Primary | ICD-10-CM

## 2019-05-02 PROCEDURE — 99024 POSTOP FOLLOW-UP VISIT: CPT | Mod: HCNC,S$GLB,, | Performed by: OPHTHALMOLOGY

## 2019-05-02 PROCEDURE — 99999 PR PBB SHADOW E&M-EST. PATIENT-LVL II: ICD-10-PCS | Mod: PBBFAC,HCNC,, | Performed by: OPHTHALMOLOGY

## 2019-05-02 PROCEDURE — 99024 PR POST-OP FOLLOW-UP VISIT: ICD-10-PCS | Mod: HCNC,S$GLB,, | Performed by: OPHTHALMOLOGY

## 2019-05-02 PROCEDURE — 99999 PR PBB SHADOW E&M-EST. PATIENT-LVL II: CPT | Mod: PBBFAC,HCNC,, | Performed by: OPHTHALMOLOGY

## 2019-05-02 NOTE — PROGRESS NOTES
SUBJECTIVE:   Isamar Roe is a 84 y.o. female   Uncorrected distance visual acuity was not recorded in the right eye and 20/40 -2 in the left eye.   Chief Complaint   Patient presents with    Post-op Evaluation     1 day PCIOL OS        HPI:  HPI     Post-op Evaluation      Additional comments: 1 day PCIOL OS              Comments     The patient states her left eye is doing well and she denies any ocular   pain at this time.  100% drop compliance    Referred by [facility] [place] 02/28/19  1. Cataracts OD  PCIOL OS +22.5 SN60WF (-1.75--2.00) 5-1-19  2. Choroidal nevus od  3. Dermato ou  SCL wear monovision 45 years (OD distance)    OS- Pred QID, Keto QID, Gati BID          Last edited by Olga Villalta on 5/2/2019  1:00 PM. (History)        Assessment /Plan :  1. Post-operative state Exam:  SLE:  S/C: normal  K : trace k fold  AC: trace cell and flare  Iris: normal  Lens: PCIOL    IMP:  PO Day 1 S/P Phaco/IOL OS : Doing well.    Plan:  Continue gtts to operative eye:  Pred 1% QID  Ketorolac QID  Zymaxid BID  Reinstructed in importance of absolute compliance with Post-OP instructions including medications, shield at bedtime, and limitation of activities. Follow up appointments in approximately one and six weeks or call immediately for increased pain, redness or vision loss.

## 2019-05-03 LAB — BACTERIA UR CULT: NORMAL

## 2019-05-09 ENCOUNTER — OFFICE VISIT (OUTPATIENT)
Dept: OPHTHALMOLOGY | Facility: CLINIC | Age: 84
End: 2019-05-09
Payer: MEDICARE

## 2019-05-09 DIAGNOSIS — H25.11 NUCLEAR SCLEROSIS OF RIGHT EYE: ICD-10-CM

## 2019-05-09 DIAGNOSIS — Z98.890 POST-OPERATIVE STATE: Primary | ICD-10-CM

## 2019-05-09 DIAGNOSIS — H26.9 CORTICAL CATARACT OF RIGHT EYE: ICD-10-CM

## 2019-05-09 PROCEDURE — 99999 PR PBB SHADOW E&M-EST. PATIENT-LVL III: ICD-10-PCS | Mod: PBBFAC,HCNC,, | Performed by: OPHTHALMOLOGY

## 2019-05-09 PROCEDURE — 92136 OPHTHALMIC BIOMETRY: CPT | Mod: 26,HCNC,RT,S$GLB | Performed by: OPHTHALMOLOGY

## 2019-05-09 PROCEDURE — 99024 POSTOP FOLLOW-UP VISIT: CPT | Mod: HCNC,S$GLB,, | Performed by: OPHTHALMOLOGY

## 2019-05-09 PROCEDURE — 92136 IOL MASTER - OD - RIGHT EYE: ICD-10-PCS | Mod: 26,HCNC,RT,S$GLB | Performed by: OPHTHALMOLOGY

## 2019-05-09 PROCEDURE — 99999 PR PBB SHADOW E&M-EST. PATIENT-LVL III: CPT | Mod: PBBFAC,HCNC,, | Performed by: OPHTHALMOLOGY

## 2019-05-09 PROCEDURE — 99024 PR POST-OP FOLLOW-UP VISIT: ICD-10-PCS | Mod: HCNC,S$GLB,, | Performed by: OPHTHALMOLOGY

## 2019-05-09 RX ORDER — PREDNISOLONE ACETATE 10 MG/ML
1 SUSPENSION/ DROPS OPHTHALMIC 4 TIMES DAILY
Qty: 1 BOTTLE | Refills: 2 | Status: SHIPPED | OUTPATIENT
Start: 2019-05-09 | End: 2019-06-13

## 2019-05-09 RX ORDER — GATIFLOXACIN 5 MG/ML
1 SOLUTION/ DROPS OPHTHALMIC 2 TIMES DAILY
Qty: 1 BOTTLE | Refills: 2 | Status: SHIPPED | OUTPATIENT
Start: 2019-05-09 | End: 2019-06-13

## 2019-05-09 RX ORDER — KETOROLAC TROMETHAMINE 5 MG/ML
1 SOLUTION OPHTHALMIC 4 TIMES DAILY
Qty: 1 BOTTLE | Refills: 2 | Status: SHIPPED | OUTPATIENT
Start: 2019-05-09 | End: 2019-06-13

## 2019-05-09 NOTE — PROGRESS NOTES
SUBJECTIVE:   Isamar Roe is a 84 y.o. female   Uncorrected distance visual acuity was not recorded in the right eye and 20/30 in the left eye.   Chief Complaint   Patient presents with    Post-op Evaluation     1 week PCIOL OS 5/1/19, OS: Pred and Keto QID, Gati BID        HPI:  HPI     Post-op Evaluation      Additional comments: 1 week PCIOL OS 5/1/19, OS: Pred and Keto QID, Gati   BID              Comments     Pt states no problems since her last visit. Using her drops as directed.   No pain or irritation.     Referred by [facility] [place] [facility] 02/28/19  1. Cataracts OD  PCIOL OS +22.5 SN60WF (-1.75--2.00) 5-1-19  2. Choroidal nevus od  3. Dermato ou  SCL wear monovision 45 years (OD distance)    OS- Pred QID, Keto QID, Gati BID          Last edited by Peña Chavez on 5/9/2019  1:46 PM. (History)        Assessment /Plan :  1. Post-operative state Slit lamp exam:  L/L: nl  K: clear, wound sealed  AC: 0 cell and flare  Iris/Lens: IOL centered and stable      IMP:  PO Week 1 S/P Phaco/ IOL OS : Doing well with no evidence of infection or abnormal inflammation.     Plan:  Pt given and instructed in one week PO instructions. D/C zymaxid and start to taper off Ketorlac and Pred Forte 1% weekly. Can resume normal activitites and d/c eye shield. OTC reading glasses can be used until evaluated for final MR. Follow up in one month or PRN pain, redness, vision loss, or other concerns.     2. Nuclear sclerosis of right eye   Patient reports decreased vision in the fellow eye consistent with the clinical amount of lenticular opacity, which reaches the level of visual significance and affects activities of daily living including reading and glare. Risks, benefits, and alternatives to cataract surgery were discussed and pt desired to schedule cataract surgery. Pt was consented and the biometry and lens options were reviewed.    Schedule cataract surgery in OD       Pt is interested in traditional monofocal IOL aiming for:   Monovision and  understands that depth perception and higher quality vision will generally be poorer unless corrective glassses are worn.      Final visual acuity may be limited by astigmatism    Pt wishes to have Phaco/IOL  OD     Requests a Monofocal IOL.    Will aim for distance    Other considerations: None       3. Cortical cataract of right eye as above

## 2019-05-12 PROBLEM — R05.9 COUGH: Status: RESOLVED | Noted: 2017-11-13 | Resolved: 2019-05-12

## 2019-05-12 RX ORDER — ASPIRIN 81 MG/1
81 TABLET ORAL DAILY
Refills: 0 | Status: CANCELLED | COMMUNITY
Start: 2019-05-12 | End: 2020-05-11

## 2019-05-12 NOTE — PROGRESS NOTES
Isamar Roe  05/13/2019  [id number]    Brian Shin MD  Patient Care Team:  Brian Shin MD as PCP - General (Family Medicine)  CASEY Giang OD as Consulting Physician (Optometry)  Reinaldo Noriega III, MD (Dermatology)  Leticia Maya MD as Consulting Physician (Endocrinology)  Oscar Hanson LPN as Care Coordinator (Internal Medicine)  Has the patient seen any provider outside of the [facility] network since the last visit? (no). If yes, HIPPA forms completed and records requested.        Visit Type:Establish with new provider    Chief Complaint:  Chief Complaint   Patient presents with    Follow-up     from uti. she said it doesn't burn but she said she knows somthing is still wrong with it. she said she would feel like she has to go then when she gets to the bathroom she can't go.    Back Pain     going down to her toes. she gets an injection on the 22nd.       History of Present Illness:  Patient would like to switch from [facility] office. PCP was Dr. Shin.  Recently in [facility] office for UTI.  Sx resolved.  She is not having the burning as much. She has hesitancy.    Last visit with Dr. Shin was in Jan.     She has history of HTN.  She has history of elevated cholesterol, and intolerance to statin.  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     She has osteoporosis. Offered Bisphosphonate and deferred treatment.  Last Dexa in 2019.  She does take calcium and Vit D.    Lipids scheduled for August.    She has seen Dr. Almanzar. SI joint inflammation. She has another injection this month.  She had MRI, and is doing injection.  She hasn't been able to exercise.   Denies any weakness.           History:  Past Medical History:   Diagnosis Date    Arthritis     hands    Benign hematuria     shingleton    Calcific tendinitis of left shoulder 1/27/2016    Cataract     Diverticulosis     colonoscopy 5/22/2012    Duodenal diverticulum     EGD 5/22/2012    GERD (gastroesophageal reflux disease)      Hemorrhoids     colonoscopy 5/22/2012    History of skin cancer     per patient s/p excision, no chemo or radiation, sees outside derm, Dr. Tinsley.    Hyperlipidemia     Hypertension     Impingement syndrome of left shoulder 1/27/2016    Osteoporosis     declines tx;11/17/16 phone note    Pneumonia     as a child    Sciatica     [place]; dr castillo    Skin cancer     Dr. Noriega    Vitamin D deficiency disease      Past Surgical History:   Procedure Laterality Date    APPENDECTOMY      CARPAL TUNNEL RELEASE Bilateral     CATARACT EXTRACTION W/  INTRAOCULAR LENS IMPLANT Left 05/01/2019    CHOLECYSTECTOMY      FACETECTOMY Left 9/16/2014    Performed by [facility] at [facility]    HYSTERECTOMY      joint removal in right thumb      LAMINECTOMY-ANGELIA-SEMI Left 9/16/2014    [facility]formed [facility] at [facility]    MICRODISKECTOMY-MINIMALLY INVASIVE Left 9/16/2014    [facility] at [facility]    SKIN CANCER EXCISION      SPINE SURGERY  9/16/14    L4/5 laminectomy;dr lozada    TOE SURGERY      LT FIFTH     Family History   Problem Relation Age of Onset    Heart disease Mother     Hypertension Mother     Cataracts Mother     Heart disease Father     Hypertension Father     Cataracts Father     Heart disease Brother 45    Heart disease Sister         A-fib    Heart disease Sister         heart bypass x 5    Kidney disease Brother 70    Heart disease Brother     Cancer Maternal Aunt         breast    Diabetes Maternal Grandmother     Diabetes Maternal Aunt     Hypertension Other         multiple family members    Cancer Other         multiple with skin cancer    Stroke Neg Hx     Melanoma Neg Hx      Social History     Socioeconomic History    Marital status:      Spouse name: AMBREEN    Number of children: 4    Years of education: Not on file    Highest education level: Not on file   Occupational History    Not on file   Social Needs     Financial resource strain: Not on file    Food insecurity:     Worry: Not on file     Inability: Not on file    Transportation needs:     Medical: Not on file     Non-medical: Not on file   Tobacco Use    Smoking status: Never Smoker    Smokeless tobacco: Never Used   Substance and Sexual Activity    Alcohol use: No    Drug use: No    Sexual activity: Yes     Partners: Male     Birth control/protection: See Surgical Hx   Lifestyle    Physical activity:     Days per week: Not on file     Minutes per session: Not on file    Stress: Not on file   Relationships    Social connections:     Talks on phone: Not on file     Gets together: Not on file     Attends L[employer] service: Not on file     Active member of club or organization: Not on file     Attends meetings of clubs or organizations: Not on file     Relationship status: Not on file   Other Topics Concern    Are you pregnant or think you may be? No    Breast-feeding No   Social History Narrative    Wears a seatbelt.     Patient Active Problem List   Diagnosis    HTN (hypertension)    Hyperlipidemia    Hiatal hernia with gastroesophageal reflux    Osteoporosis    Nuclear sclerosis of both eyes    Choroidal nevus of right eye    Arteriosclerosis of aorta    Heart valve regurgitation    History of skin cancer    Maxillary sinusitis    Statin intolerance     Review of patient's allergies indicates:   Allergen Reactions    Ace inhibitors      Other reaction(s): cough    Amlodipine      Leg swelling    Chlorthalidone      Other reaction(s): ? dizzy  Other reaction(s): ? dizzy    Codeine Nausea And Vomiting    Demerol (pf)  [meperidine (pf)]      Other reaction(s): Itching    Fosamax  [alendronate]      Other reaction(s): aches    Fosamax plus d  [alendronate-vitamin d3]      Other reaction(s): aches    Hydrochlorothiazide (bulk)      Other reaction(s): bad taste    Hydrocodone-acetaminophen      Other reaction(s): Stomach upset  Other  reaction(s): Nausea    Ibandronate      Other reaction(s): aches  Other reaction(s): aches    Ibandronate sodium      Other reaction(s): Unknown    Losartan Diarrhea    Meperidine Itching    Opioids - morphine analogues     Opium     Statins-hmg-coa reductase inhibitors      Few statins intol    Toprol xl  [metoprolol succinate]      Other reaction(s): diarrhea  Other reaction(s): diarrhea    Tramadol        The following were reviewed at this visit: active problem list, medication list, allergies, family history, social history, and health maintenance.    Medications:  Current Outpatient Medications on File Prior to Visit   Medication Sig Dispense Refill    calcium-vitamin D 250-100 mg-unit per tablet Take 1 tablet by mouth.      gatifloxacin 0.5 % Drop drops Place 1 drop into the right eye 2 (two) times daily. Eyedrops to start one day before surgery 1 Bottle 2    ketorolac 0.5% (ACULAR) 0.5 % Drop Place 1 drop into the left eye 4 (four) times daily. Eyedrops to start one day before surgery 1 Bottle 2    ketorolac 0.5% (ACULAR) 0.5 % Drop Place 1 drop into the right eye 4 (four) times daily. Eyedrops to start one day before surgery 1 Bottle 2    prednisoLONE acetate (PRED FORTE) 1 % DrpS Place 1 drop into the left eye 4 (four) times daily. Start one day before surgery 5 mL 2    prednisoLONE acetate (PRED FORTE) 1 % DrpS Place 1 drop into the right eye 4 (four) times daily. Eyedrops to start one day before surgery 1 Bottle 2    verapamil (CALAN) 120 MG tablet Take 1 tablet (120 mg total) by mouth 2 (two) times daily. 60 tablet 5    vitamin E 400 UNIT capsule Take 400 Units by mouth once daily.      gabapentin (NEURONTIN) 300 MG capsule Take 1 capsule (300 mg total) by mouth 2 (two) times daily. 60 capsule 1    [DISCONTINUED] ciprofloxacin HCl (CIPRO) 500 MG tablet Take 1 tablet (500 mg total) by mouth 2 (two) times daily. 14 tablet 0    [DISCONTINUED] fish oil-fat acid comb.8-hb137 (OMEGA 3-6-9)  1,200 mg Cap Take 1 capsule by mouth once daily.       [DISCONTINUED] hydrocortisone 2.5 % cream Apply to seborrhea at night if needed.      [DISCONTINUED] loratadine (CLARITIN) 10 mg tablet Take 1 tablet (10 mg total) by mouth once daily.  0    [DISCONTINUED] niacin, inositol niacinate, 400 mg niacin (500 mg) Cap Take 1 capsule by mouth once daily.       [DISCONTINUED] ondansetron (ZOFRAN) 4 MG tablet Take 4 mg by mouth.       No current facility-administered medications on file prior to visit.        Medications have been reviewed and reconciled with patient at this visit.  Barriers to medications present (no)    Adverse reactions to current medications (no)    Over the counter medications reviewed (Yes ), and if needed added to active Medication list at this visit.     Exam:  Wt Readings from Last 3 Encounters:   05/13/19 61.6 kg (135 lb 12.9 oz)   04/30/19 62.2 kg (137 lb 2 oz)   04/15/19 61 kg (134 lb 7.7 oz)     Temp Readings from Last 3 Encounters:   05/13/19 96.3 °F (35.7 °C) (Tympanic)   04/30/19 97.4 °F (36.3 °C) (Tympanic)   02/11/19 96.5 °F (35.8 °C) (Tympanic)     BP Readings from Last 3 Encounters:   05/13/19 114/60   04/30/19 138/76   04/15/19 133/65     Pulse Readings from Last 3 Encounters:   05/13/19 76   04/30/19 91   04/15/19 80     Body mass index is 28.38 kg/m².      Review of Systems   Constitutional: Negative.  Negative for chills and fever.   HENT: Negative.  Negative for congestion, sinus pain and sore throat.    Eyes: Negative for blurred vision and double vision.   Respiratory: Negative for cough, sputum production, shortness of breath and wheezing.    Cardiovascular: Negative for chest pain, palpitations and leg swelling.   Gastrointestinal: Negative for abdominal pain, constipation, diarrhea, heartburn, nausea and vomiting.   Genitourinary: Negative.         Hesitency   Musculoskeletal: Positive for back pain and joint pain.   Skin: Negative.  Negative for rash.   Neurological:  Negative.    Endo/Heme/Allergies: Negative.  Negative for polydipsia. Does not bruise/bleed easily.   Psychiatric/Behavioral: Negative for depression and substance abuse.     Physical Exam   Constitutional: She is oriented to person, place, and time. She appears well-developed and well-nourished. No distress.   HENT:   Head: Normocephalic and atraumatic.   Right Ear: External ear normal.   Left Ear: External ear normal.   Nose: Nose normal.   Mouth/Throat: Oropharynx is clear and moist. No oropharyngeal exudate.   Eyes: Pupils are equal, round, and reactive to light. Conjunctivae and EOM are normal. Right eye exhibits no discharge. Left eye exhibits no discharge.   Neck: Normal range of motion. Neck supple. No thyromegaly present.   Cardiovascular: Normal rate, regular rhythm, normal heart sounds and intact distal pulses.   No murmur heard.  Pulmonary/Chest: Effort normal and breath sounds normal. No respiratory distress. She has no wheezes.   Abdominal: Soft. Bowel sounds are normal. She exhibits no distension and no mass. There is no tenderness.   Musculoskeletal: Normal range of motion. She exhibits no edema.   Lymphadenopathy:     She has no cervical adenopathy.   Neurological: She is alert and oriented to person, place, and time. No cranial nerve deficit.   Skin: Capillary refill takes less than 2 seconds. She is not diaphoretic.   Psychiatric: She has a normal mood and affect. Her behavior is normal. Judgment and thought content normal.   Nursing note and vitals reviewed.      Laboratory Reviewed ({Yes)  Lab Results   Component Value Date    WBC 5.56 04/18/2018    HGB 13.6 04/18/2018    HCT 43.3 04/18/2018     04/18/2018    CHOL 277 (H) 08/30/2018    TRIG 114 08/30/2018    HDL 71 08/30/2018    ALT 12 08/30/2018    AST 14 08/30/2018     08/30/2018    K 4.3 08/30/2018     08/30/2018    CREATININE 0.7 08/30/2018    BUN 19 08/30/2018    CO2 26 08/30/2018    TSH 1.292 07/10/2013    INR 1.0  02/02/2015       Isamar was seen today for follow-up and back pain.    Diagnoses and all orders for this visit:    Encounter to establish care    Arteriosclerosis of aorta    Heart valve regurgitation    Hiatal hernia with gastroesophageal reflux    Hyperlipidemia, unspecified hyperlipidemia type    Essential hypertension    Inflammatory spondylopathy of lumbar region    History of UTI  -     URINALYSIS  -     Urine culture    Abnormal finding in urine   -     Urine culture    Other orders  -     Cancel: aspirin (ECOTRIN) 81 MG EC tablet; Take 1 tablet (81 mg total) by mouth once daily.      Labs and HM up to date  Labs due in August  Return for annual in August    Will discuss Shingles vaccine with her Pharmacist.          Care Plan/Goals: Reviewed  (N/A)  Goals     None          Follow up: No follow-ups on file.    After visit summary was printed and given to patient upon discharge today.  Patient goals and care plan are included in After Visit Summary.

## 2019-05-12 NOTE — H&P (VIEW-ONLY)
Isamar Roe  05/13/2019  [id number]    Brian Shin MD  Patient Care Team:  Brian Shin MD as PCP - General (Family Medicine)  CASEY Giang OD as Consulting Physician (Optometry)  Reinaldo Noriega III, MD (Dermatology)  Leticia Maya MD as Consulting Physician (Endocrinology)  Oscar Hanson LPN as Care Coordinator (Internal Medicine)  Has the patient seen any provider outside of the [facility] network since the last visit? (no). If yes, HIPPA forms completed and records requested.        Visit Type:Establish with new provider    Chief Complaint:  Chief Complaint   Patient presents with    Follow-up     from uti. she said it doesn't burn but she said she knows somthing is still wrong with it. she said she would feel like she has to go then when she gets to the bathroom she can't go.    Back Pain     going down to her toes. she gets an injection on the 22nd.       History of Present Illness:  Patient would like to switch from [facility] office. PCP was Dr. Shin.  Recently in [facility] office for UTI.  Sx resolved.  She is not having the burning as much. She has hesitancy.    Last visit with Dr. Shin was in Jan.     She has history of HTN.  She has history of elevated cholesterol, and intolerance to statin.  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     She has osteoporosis. Offered Bisphosphonate and deferred treatment.  Last Dexa in 2019.  She does take calcium and Vit D.    Lipids scheduled for August.    She has seen Dr. Almanzar. SI joint inflammation. She has another injection this month.  She had MRI, and is doing injection.  She hasn't been able to exercise.   Denies any weakness.           History:  Past Medical History:   Diagnosis Date    Arthritis     hands    Benign hematuria     shingleton    Calcific tendinitis of left shoulder 1/27/2016    Cataract     Diverticulosis     colonoscopy 5/22/2012    Duodenal diverticulum     EGD 5/22/2012    GERD (gastroesophageal reflux disease)      Hemorrhoids     colonoscopy 5/22/2012    History of skin cancer     per patient s/p excision, no chemo or radiation, sees outside derm, Dr. Tinsley.    Hyperlipidemia     Hypertension     Impingement syndrome of left shoulder 1/27/2016    Osteoporosis     declines tx;11/17/16 phone note    Pneumonia     as a child    Sciatica     [place]; dr castillo    Skin cancer     Dr. Noriega    Vitamin D deficiency disease      Past Surgical History:   Procedure Laterality Date    APPENDECTOMY      CARPAL TUNNEL RELEASE Bilateral     CATARACT EXTRACTION W/  INTRAOCULAR LENS IMPLANT Left 05/01/2019    CHOLECYSTECTOMY      FACETECTOMY Left 9/16/2014    [facility]formed [facility] at [facility] OR 2ND FLR    HYSTERECTOMY      joint removal in right thumb      LAMINECTOMY-ANGELIA-SEMI Left 9/16/2014    [facility] at [facility] OR 2ND FLR    MICRODISKECTOMY-MINIMALLY INVASIVE Left 9/16/2014    [facility] at [facility] OR 2ND FLR    SKIN CANCER EXCISION      SPINE SURGERY  9/16/14    L4/5 laminectomy;dr lozada    TOE SURGERY      LT FIFTH     Family History   Problem Relation Age of Onset    Heart disease Mother     Hypertension Mother     Cataracts Mother     Heart disease Father     Hypertension Father     Cataracts Father     Heart disease Brother 45    Heart disease Sister         A-fib    Heart disease Sister         heart bypass x 5    Kidney disease Brother 70    Heart disease Brother     Cancer Maternal Aunt         breast    Diabetes Maternal Grandmother     Diabetes Maternal Aunt     Hypertension Other         multiple family members    Cancer Other         multiple with skin cancer    Stroke Neg Hx     Melanoma Neg Hx      Social History     Socioeconomic History    Marital status:      Spouse name: AMBREEN    Number of children: 4    Years of education: Not on file    Highest education level: Not on file   Occupational History    Not on file   Social Needs     Financial resource strain: Not on file    Food insecurity:     Worry: Not on file     Inability: Not on file    Transportation needs:     Medical: Not on file     Non-medical: Not on file   Tobacco Use    Smoking status: Never Smoker    Smokeless tobacco: Never Used   Substance and Sexual Activity    Alcohol use: No    Drug use: No    Sexual activity: Yes     Partners: Male     Birth control/protection: See Surgical Hx   Lifestyle    Physical activity:     Days per week: Not on file     Minutes per session: Not on file    Stress: Not on file   Relationships    Social connections:     Talks on phone: Not on file     Gets together: Not on file     Attends Congregation service: Not on file     Active member of club or organization: Not on file     Attends meetings of clubs or organizations: Not on file     Relationship status: Not on file   Other Topics Concern    Are you pregnant or think you may be? No    Breast-feeding No   Social History Narrative    Wears a seatbelt.     Patient Active Problem List   Diagnosis    HTN (hypertension)    Hyperlipidemia    Hiatal hernia with gastroesophageal reflux    Osteoporosis    Nuclear sclerosis of both eyes    Choroidal nevus of right eye    Arteriosclerosis of aorta    Heart valve regurgitation    History of skin cancer    Maxillary sinusitis    Statin intolerance     Review of patient's allergies indicates:   Allergen Reactions    Ace inhibitors      Other reaction(s): cough    Amlodipine      Leg swelling    Chlorthalidone      Other reaction(s): ? dizzy  Other reaction(s): ? dizzy    Codeine Nausea And Vomiting    Demerol (pf)  [meperidine (pf)]      Other reaction(s): Itching    Fosamax  [alendronate]      Other reaction(s): aches    Fosamax plus d  [alendronate-vitamin d3]      Other reaction(s): aches    Hydrochlorothiazide (bulk)      Other reaction(s): bad taste    Hydrocodone-acetaminophen      Other reaction(s): Stomach upset  Other  reaction(s): Nausea    Ibandronate      Other reaction(s): aches  Other reaction(s): aches    Ibandronate sodium      Other reaction(s): Unknown    Losartan Diarrhea    Meperidine Itching    Opioids - morphine analogues     Opium     Statins-hmg-coa reductase inhibitors      Few statins intol    Toprol xl  [metoprolol succinate]      Other reaction(s): diarrhea  Other reaction(s): diarrhea    Tramadol        The following were reviewed at this visit: active problem list, medication list, allergies, family history, social history, and health maintenance.    Medications:  Current Outpatient Medications on File Prior to Visit   Medication Sig Dispense Refill    calcium-vitamin D 250-100 mg-unit per tablet Take 1 tablet by mouth.      gatifloxacin 0.5 % Drop drops Place 1 drop into the right eye 2 (two) times daily. Eyedrops to start one day before surgery 1 Bottle 2    ketorolac 0.5% (ACULAR) 0.5 % Drop Place 1 drop into the left eye 4 (four) times daily. Eyedrops to start one day before surgery 1 Bottle 2    ketorolac 0.5% (ACULAR) 0.5 % Drop Place 1 drop into the right eye 4 (four) times daily. Eyedrops to start one day before surgery 1 Bottle 2    prednisoLONE acetate (PRED FORTE) 1 % DrpS Place 1 drop into the left eye 4 (four) times daily. Start one day before surgery 5 mL 2    prednisoLONE acetate (PRED FORTE) 1 % DrpS Place 1 drop into the right eye 4 (four) times daily. Eyedrops to start one day before surgery 1 Bottle 2    verapamil (CALAN) 120 MG tablet Take 1 tablet (120 mg total) by mouth 2 (two) times daily. 60 tablet 5    vitamin E 400 UNIT capsule Take 400 Units by mouth once daily.      gabapentin (NEURONTIN) 300 MG capsule Take 1 capsule (300 mg total) by mouth 2 (two) times daily. 60 capsule 1    [DISCONTINUED] ciprofloxacin HCl (CIPRO) 500 MG tablet Take 1 tablet (500 mg total) by mouth 2 (two) times daily. 14 tablet 0    [DISCONTINUED] fish oil-fat acid comb.8-hb137 (OMEGA 3-6-9)  1,200 mg Cap Take 1 capsule by mouth once daily.       [DISCONTINUED] hydrocortisone 2.5 % cream Apply to seborrhea at night if needed.      [DISCONTINUED] loratadine (CLARITIN) 10 mg tablet Take 1 tablet (10 mg total) by mouth once daily.  0    [DISCONTINUED] niacin, inositol niacinate, 400 mg niacin (500 mg) Cap Take 1 capsule by mouth once daily.       [DISCONTINUED] ondansetron (ZOFRAN) 4 MG tablet Take 4 mg by mouth.       No current facility-administered medications on file prior to visit.        Medications have been reviewed and reconciled with patient at this visit.  Barriers to medications present (no)    Adverse reactions to current medications (no)    Over the counter medications reviewed (Yes ), and if needed added to active Medication list at this visit.     Exam:  Wt Readings from Last 3 Encounters:   05/13/19 61.6 kg (135 lb 12.9 oz)   04/30/19 62.2 kg (137 lb 2 oz)   04/15/19 61 kg (134 lb 7.7 oz)     Temp Readings from Last 3 Encounters:   05/13/19 96.3 °F (35.7 °C) (Tympanic)   04/30/19 97.4 °F (36.3 °C) (Tympanic)   02/11/19 96.5 °F (35.8 °C) (Tympanic)     BP Readings from Last 3 Encounters:   05/13/19 114/60   04/30/19 138/76   04/15/19 133/65     Pulse Readings from Last 3 Encounters:   05/13/19 76   04/30/19 91   04/15/19 80     Body mass index is 28.38 kg/m².      Review of Systems   Constitutional: Negative.  Negative for chills and fever.   HENT: Negative.  Negative for congestion, sinus pain and sore throat.    Eyes: Negative for blurred vision and double vision.   Respiratory: Negative for cough, sputum production, shortness of breath and wheezing.    Cardiovascular: Negative for chest pain, palpitations and leg swelling.   Gastrointestinal: Negative for abdominal pain, constipation, diarrhea, heartburn, nausea and vomiting.   Genitourinary: Negative.         Hesitency   Musculoskeletal: Positive for back pain and joint pain.   Skin: Negative.  Negative for rash.   Neurological:  Negative.    Endo/Heme/Allergies: Negative.  Negative for polydipsia. Does not bruise/bleed easily.   Psychiatric/Behavioral: Negative for depression and substance abuse.     Physical Exam   Constitutional: She is oriented to person, place, and time. She appears well-developed and well-nourished. No distress.   HENT:   Head: Normocephalic and atraumatic.   Right Ear: External ear normal.   Left Ear: External ear normal.   Nose: Nose normal.   Mouth/Throat: Oropharynx is clear and moist. No oropharyngeal exudate.   Eyes: Pupils are equal, round, and reactive to light. Conjunctivae and EOM are normal. Right eye exhibits no discharge. Left eye exhibits no discharge.   Neck: Normal range of motion. Neck supple. No thyromegaly present.   Cardiovascular: Normal rate, regular rhythm, normal heart sounds and intact distal pulses.   No murmur heard.  Pulmonary/Chest: Effort normal and breath sounds normal. No respiratory distress. She has no wheezes.   Abdominal: Soft. Bowel sounds are normal. She exhibits no distension and no mass. There is no tenderness.   Musculoskeletal: Normal range of motion. She exhibits no edema.   Lymphadenopathy:     She has no cervical adenopathy.   Neurological: She is alert and oriented to person, place, and time. No cranial nerve deficit.   Skin: Capillary refill takes less than 2 seconds. She is not diaphoretic.   Psychiatric: She has a normal mood and affect. Her behavior is normal. Judgment and thought content normal.   Nursing note and vitals reviewed.      Laboratory Reviewed ({Yes)  Lab Results   Component Value Date    WBC 5.56 04/18/2018    HGB 13.6 04/18/2018    HCT 43.3 04/18/2018     04/18/2018    CHOL 277 (H) 08/30/2018    TRIG 114 08/30/2018    HDL 71 08/30/2018    ALT 12 08/30/2018    AST 14 08/30/2018     08/30/2018    K 4.3 08/30/2018     08/30/2018    CREATININE 0.7 08/30/2018    BUN 19 08/30/2018    CO2 26 08/30/2018    TSH 1.292 07/10/2013    INR 1.0  02/02/2015       Isamar was seen today for follow-up and back pain.    Diagnoses and all orders for this visit:    Encounter to establish care    Arteriosclerosis of aorta    Heart valve regurgitation    Hiatal hernia with gastroesophageal reflux    Hyperlipidemia, unspecified hyperlipidemia type    Essential hypertension    Inflammatory spondylopathy of lumbar region    History of UTI  -     URINALYSIS  -     Urine culture    Abnormal finding in urine   -     Urine culture    Other orders  -     Cancel: aspirin (ECOTRIN) 81 MG EC tablet; Take 1 tablet (81 mg total) by mouth once daily.      Labs and HM up to date  Labs due in August  Return for annual in August    Will discuss Shingles vaccine with her Pharmacist.          Care Plan/Goals: Reviewed  (N/A)  Goals     None          Follow up: No follow-ups on file.    After visit summary was printed and given to patient upon discharge today.  Patient goals and care plan are included in After Visit Summary.

## 2019-05-13 ENCOUNTER — OFFICE VISIT (OUTPATIENT)
Dept: INTERNAL MEDICINE | Facility: CLINIC | Age: 84
End: 2019-05-13
Payer: MEDICARE

## 2019-05-13 VITALS
HEIGHT: 58 IN | OXYGEN SATURATION: 95 % | TEMPERATURE: 96 F | BODY MASS INDEX: 28.51 KG/M2 | HEART RATE: 76 BPM | SYSTOLIC BLOOD PRESSURE: 114 MMHG | WEIGHT: 135.81 LBS | DIASTOLIC BLOOD PRESSURE: 60 MMHG

## 2019-05-13 DIAGNOSIS — M46.96 INFLAMMATORY SPONDYLOPATHY OF LUMBAR REGION: ICD-10-CM

## 2019-05-13 DIAGNOSIS — E78.5 HYPERLIPIDEMIA, UNSPECIFIED HYPERLIPIDEMIA TYPE: Chronic | ICD-10-CM

## 2019-05-13 DIAGNOSIS — I10 ESSENTIAL HYPERTENSION: Chronic | ICD-10-CM

## 2019-05-13 DIAGNOSIS — R82.90 ABNORMAL FINDING IN URINE: ICD-10-CM

## 2019-05-13 DIAGNOSIS — Z87.440 HISTORY OF UTI: ICD-10-CM

## 2019-05-13 DIAGNOSIS — Z76.89 ENCOUNTER TO ESTABLISH CARE: Primary | ICD-10-CM

## 2019-05-13 DIAGNOSIS — I70.0 ARTERIOSCLEROSIS OF AORTA: ICD-10-CM

## 2019-05-13 DIAGNOSIS — K44.9 HIATAL HERNIA WITH GASTROESOPHAGEAL REFLUX: ICD-10-CM

## 2019-05-13 DIAGNOSIS — K21.9 HIATAL HERNIA WITH GASTROESOPHAGEAL REFLUX: ICD-10-CM

## 2019-05-13 DIAGNOSIS — I38 HEART VALVE REGURGITATION: ICD-10-CM

## 2019-05-13 LAB
BACTERIA #/AREA URNS AUTO: ABNORMAL /HPF
BILIRUB UR QL STRIP: NEGATIVE
CAOX CRY UR QL COMP ASSIST: ABNORMAL
CLARITY UR REFRACT.AUTO: ABNORMAL
COLOR UR AUTO: YELLOW
GLUCOSE UR QL STRIP: NEGATIVE
HGB UR QL STRIP: ABNORMAL
KETONES UR QL STRIP: NEGATIVE
LEUKOCYTE ESTERASE UR QL STRIP: ABNORMAL
MICROSCOPIC COMMENT: ABNORMAL
NITRITE UR QL STRIP: POSITIVE
PH UR STRIP: 5 [PH] (ref 5–8)
PROT UR QL STRIP: NEGATIVE
RBC #/AREA URNS AUTO: 3 /HPF (ref 0–4)
SP GR UR STRIP: 1.01 (ref 1–1.03)
SQUAMOUS #/AREA URNS AUTO: 1 /HPF
URN SPEC COLLECT METH UR: ABNORMAL
WBC #/AREA URNS AUTO: >100 /HPF (ref 0–5)
WBC CLUMPS UR QL AUTO: ABNORMAL

## 2019-05-13 PROCEDURE — 99499 RISK ADDL DX/OHS AUDIT: ICD-10-PCS | Mod: HCNC,S$GLB,, | Performed by: FAMILY MEDICINE

## 2019-05-13 PROCEDURE — 99999 PR PBB SHADOW E&M-EST. PATIENT-LVL III: CPT | Mod: PBBFAC,HCNC,, | Performed by: FAMILY MEDICINE

## 2019-05-13 PROCEDURE — 87088 URINE BACTERIA CULTURE: CPT | Mod: HCNC

## 2019-05-13 PROCEDURE — 99499 UNLISTED E&M SERVICE: CPT | Mod: HCNC,S$GLB,, | Performed by: FAMILY MEDICINE

## 2019-05-13 PROCEDURE — 99214 OFFICE O/P EST MOD 30 MIN: CPT | Mod: HCNC,S$GLB,, | Performed by: FAMILY MEDICINE

## 2019-05-13 PROCEDURE — 3078F PR MOST RECENT DIASTOLIC BLOOD PRESSURE < 80 MM HG: ICD-10-PCS | Mod: HCNC,CPTII,S$GLB, | Performed by: FAMILY MEDICINE

## 2019-05-13 PROCEDURE — 99214 PR OFFICE/OUTPT VISIT, EST, LEVL IV, 30-39 MIN: ICD-10-PCS | Mod: HCNC,S$GLB,, | Performed by: FAMILY MEDICINE

## 2019-05-13 PROCEDURE — 1101F PT FALLS ASSESS-DOCD LE1/YR: CPT | Mod: HCNC,CPTII,S$GLB, | Performed by: FAMILY MEDICINE

## 2019-05-13 PROCEDURE — 3074F PR MOST RECENT SYSTOLIC BLOOD PRESSURE < 130 MM HG: ICD-10-PCS | Mod: HCNC,CPTII,S$GLB, | Performed by: FAMILY MEDICINE

## 2019-05-13 PROCEDURE — 81001 URINALYSIS AUTO W/SCOPE: CPT | Mod: HCNC

## 2019-05-13 PROCEDURE — 99999 PR PBB SHADOW E&M-EST. PATIENT-LVL III: ICD-10-PCS | Mod: PBBFAC,HCNC,, | Performed by: FAMILY MEDICINE

## 2019-05-13 PROCEDURE — 1101F PR PT FALLS ASSESS DOC 0-1 FALLS W/OUT INJ PAST YR: ICD-10-PCS | Mod: HCNC,CPTII,S$GLB, | Performed by: FAMILY MEDICINE

## 2019-05-13 PROCEDURE — 3074F SYST BP LT 130 MM HG: CPT | Mod: HCNC,CPTII,S$GLB, | Performed by: FAMILY MEDICINE

## 2019-05-13 PROCEDURE — 3078F DIAST BP <80 MM HG: CPT | Mod: HCNC,CPTII,S$GLB, | Performed by: FAMILY MEDICINE

## 2019-05-13 PROCEDURE — 87186 SC STD MICRODIL/AGAR DIL: CPT | Mod: HCNC

## 2019-05-13 PROCEDURE — 87086 URINE CULTURE/COLONY COUNT: CPT | Mod: HCNC

## 2019-05-13 PROCEDURE — 87077 CULTURE AEROBIC IDENTIFY: CPT | Mod: HCNC

## 2019-05-14 ENCOUNTER — PATIENT MESSAGE (OUTPATIENT)
Dept: INTERNAL MEDICINE | Facility: CLINIC | Age: 84
End: 2019-05-14

## 2019-05-14 RX ORDER — AMOXICILLIN AND CLAVULANATE POTASSIUM 875; 125 MG/1; MG/1
1 TABLET, FILM COATED ORAL 2 TIMES DAILY
Qty: 20 TABLET | Refills: 0 | Status: SHIPPED | OUTPATIENT
Start: 2019-05-14 | End: 2019-06-13

## 2019-05-15 ENCOUNTER — OUTSIDE PLACE OF SERVICE (OUTPATIENT)
Dept: ADMINISTRATIVE | Facility: OTHER | Age: 84
End: 2019-05-15
Payer: MEDICARE

## 2019-05-15 LAB — BACTERIA UR CULT: NORMAL

## 2019-05-15 PROCEDURE — 66984 PR REMOVAL, CATARACT, W/INSRT INTRAOC LENS, W/O ENDO CYCLO: ICD-10-PCS | Mod: 79,RT,, | Performed by: OPHTHALMOLOGY

## 2019-05-15 PROCEDURE — 66984 XCAPSL CTRC RMVL W/O ECP: CPT | Mod: 79,RT,, | Performed by: OPHTHALMOLOGY

## 2019-05-16 ENCOUNTER — OFFICE VISIT (OUTPATIENT)
Dept: OPHTHALMOLOGY | Facility: CLINIC | Age: 84
End: 2019-05-16
Payer: MEDICARE

## 2019-05-16 DIAGNOSIS — Z98.890 POST-OPERATIVE STATE: Primary | ICD-10-CM

## 2019-05-16 PROCEDURE — 99024 POSTOP FOLLOW-UP VISIT: CPT | Mod: HCNC,S$GLB,, | Performed by: OPHTHALMOLOGY

## 2019-05-16 PROCEDURE — 99999 PR PBB SHADOW E&M-EST. PATIENT-LVL II: CPT | Mod: PBBFAC,HCNC,, | Performed by: OPHTHALMOLOGY

## 2019-05-16 PROCEDURE — 99024 PR POST-OP FOLLOW-UP VISIT: ICD-10-PCS | Mod: HCNC,S$GLB,, | Performed by: OPHTHALMOLOGY

## 2019-05-16 PROCEDURE — 99999 PR PBB SHADOW E&M-EST. PATIENT-LVL II: ICD-10-PCS | Mod: PBBFAC,HCNC,, | Performed by: OPHTHALMOLOGY

## 2019-05-16 NOTE — PROGRESS NOTES
SUBJECTIVE:   sIamar Roe is a 84 y.o. female   Uncorrected distance visual acuity was 20/25 +1 in the right eye and not recorded in the left eye.   Chief Complaint   Patient presents with    Post-op Evaluation     1 day PO PCIOL OD        HPI:  HPI     Post-op Evaluation      Additional comments: 1 day PO PCIOL OD              Comments     1 day PO PCIOL OD  No pain or discomfort  [facility] improving OD    Referred by [facility] [place] 02/28/19  1. PCIOL OD +21.0 SN60WF 5-15-19  PCIOL OS +22.5 SN60WF (-1.75--2.00) 5-1-19  2. Choroidal nevus od  3. Dermato ou  SCL wear monovision 45 years (OD distance)    OD: Pred, Ketorolac QID, Gatifloxacin BID  OS- Pred, Ketorolac TID          Last edited by Aurelia Chavira on 5/16/2019  1:34 PM. (History)        Assessment /Plan :  1. Post-operative state        Exam:  SLE:  S/C: normal  K : trace k fold  AC: 1+ cell and flare  Iris: normal  Lens: PCIOL    IMP:  PO Day 1 S/P Phaco/IOL OD : Doing well.    Plan:  Continue gtts to operative eye:  Pred 1% QID  Ketorolac QID  Zymaxid BID  Reinstructed in importance of absolute compliance with Post-OP instructions including medications, shield at bedtime, and limitation of activities. Follow up appointments in approximately one and six weeks or call immediately for increased pain, redness or vision loss.

## 2019-05-21 ENCOUNTER — OFFICE VISIT (OUTPATIENT)
Dept: OPHTHALMOLOGY | Facility: CLINIC | Age: 84
End: 2019-05-21
Payer: MEDICARE

## 2019-05-21 DIAGNOSIS — Z98.890 POST-OPERATIVE STATE: Primary | ICD-10-CM

## 2019-05-21 PROBLEM — Z96.1 PSEUDOPHAKIA: Status: ACTIVE | Noted: 2019-05-21

## 2019-05-21 PROCEDURE — 99999 PR PBB SHADOW E&M-EST. PATIENT-LVL II: ICD-10-PCS | Mod: PBBFAC,HCNC,, | Performed by: OPHTHALMOLOGY

## 2019-05-21 PROCEDURE — 99024 POSTOP FOLLOW-UP VISIT: CPT | Mod: HCNC,S$GLB,, | Performed by: OPHTHALMOLOGY

## 2019-05-21 PROCEDURE — 99999 PR PBB SHADOW E&M-EST. PATIENT-LVL II: CPT | Mod: PBBFAC,HCNC,, | Performed by: OPHTHALMOLOGY

## 2019-05-21 PROCEDURE — 99024 PR POST-OP FOLLOW-UP VISIT: ICD-10-PCS | Mod: HCNC,S$GLB,, | Performed by: OPHTHALMOLOGY

## 2019-05-21 NOTE — PROGRESS NOTES
SUBJECTIVE:   Isamar Roe is a 84 y.o. female   Uncorrected distance visual acuity was 20/25 in the right eye and not recorded in the left eye.   Chief Complaint   Patient presents with    Post-op Evaluation     1 week PCIOL OD        HPI:  HPI     Post-op Evaluation      Additional comments: 1 week PCIOL OD              Comments     The patient states her right eye is doing well and she denies any ocular   pain at this time.    Referred by [facility] [place] 02/28/19  1. PCIOL OD +21.0 SN60WF 5-15-19  PCIOL OS +22.5 SN60WF (-1.75--2.00) 5-1-19  2. Choroidal nevus od  3. Dermato ou  SCL wear monovision 45 years (OD distance)    OD: Pred QID, Ketorolac QID, Gatifloxacin BID  OS- Pred BID, Ketorolac BID          Last edited by Olga Villalta on 5/21/2019 11:08 AM. (History)        Assessment /Plan :  1. Post-operative state Slit lamp exam:  L/L: nl  K: clear, wound sealed  AC: 0 cell and flare  Iris/Lens: IOL centered and stable      IMP:  PO Week 1 S/P Phaco/ IOL OD : Doing well with no evidence of infection or abnormal inflammation.     Plan:  Pt given and instructed in one week PO instructions. D/C zymaxid and start to taper off Ketorlac and Pred Forte 1% weekly. Can resume normal activitites and d/c eye shield. OTC reading glasses can be used until evaluated for final MR. Follow up in one month or PRN pain, redness, vision loss, or other concerns.

## 2019-05-30 ENCOUNTER — HOSPITAL ENCOUNTER (OUTPATIENT)
Facility: HOSPITAL | Age: 84
Discharge: HOME | End: 2019-05-30
Attending: ANESTHESIOLOGY | Admitting: ANESTHESIOLOGY
Payer: MEDICARE

## 2019-05-30 VITALS
RESPIRATION RATE: 16 BRPM | DIASTOLIC BLOOD PRESSURE: 81 MMHG | WEIGHT: 134 LBS | BODY MASS INDEX: 28.13 KG/M2 | HEIGHT: 58 IN | OXYGEN SATURATION: 98 % | HEART RATE: 78 BPM | SYSTOLIC BLOOD PRESSURE: 196 MMHG

## 2019-05-30 DIAGNOSIS — M53.3 SACROILIAC JOINT DYSFUNCTION: Primary | ICD-10-CM

## 2019-05-30 PROCEDURE — 27096 INJECT SACROILIAC JOINT: CPT | Mod: HCNC,RT,, | Performed by: ANESTHESIOLOGY

## 2019-05-30 PROCEDURE — 25000003 PHARM REV CODE 250: Mod: HCNC | Performed by: ANESTHESIOLOGY

## 2019-05-30 PROCEDURE — 63600175 PHARM REV CODE 636 W HCPCS: Mod: HCNC

## 2019-05-30 PROCEDURE — 27096 PR INJECTION,SACROILIAC JOINT: ICD-10-PCS | Mod: HCNC,RT,, | Performed by: ANESTHESIOLOGY

## 2019-05-30 PROCEDURE — 25000003 PHARM REV CODE 250: Mod: HCNC

## 2019-05-30 PROCEDURE — 63600175 PHARM REV CODE 636 W HCPCS: Mod: HCNC | Performed by: ANESTHESIOLOGY

## 2019-05-30 RX ORDER — METHYLPREDNISOLONE ACETATE 40 MG/ML
INJECTION, SUSPENSION INTRA-ARTICULAR; INTRALESIONAL; INTRAMUSCULAR; SOFT TISSUE
Status: DISCONTINUED | OUTPATIENT
Start: 2019-05-30 | End: 2019-05-30 | Stop reason: HOSPADM

## 2019-05-30 RX ORDER — LIDOCAINE HYDROCHLORIDE 20 MG/ML
INJECTION, SOLUTION EPIDURAL; INFILTRATION; INTRACAUDAL; PERINEURAL
Status: DISCONTINUED | OUTPATIENT
Start: 2019-05-30 | End: 2019-05-30 | Stop reason: HOSPADM

## 2019-05-30 NOTE — PLAN OF CARE
Problem: Adult Inpatient Plan of Care  Goal: Plan of Care Review  Outcome: Outcome(s) achieved Date Met: 05/30/19  Patient d/c home in stable condition via wheelchair with ride. Verbalized understanding of d/c instructions. Patient voiced no complaints at this time. Patient stood at side of bed, walked steps with no new motor deficits. Neurologically intact.

## 2019-05-30 NOTE — DISCHARGE SUMMARY
[facility]  Discharge Note       Description of Procedure: Right Sacroiliac Joint Injection under Fluoroscopic Guidance    Procedure Date: 5/30/2019    Admit Date: 5/30/2019  Discharge Date: 5/30/2019     Attending Physician: Jenelle Maher   Discharge Provider: Jenelle Maher    Preoperative Diagnosis: Sacroiliitis     Postoperative Diagnosis: as above, same as preoperative diagnosis    Discharged Condition: Stable    Hospital Course: Patient was admitted for an outpatient procedure. The procedure was tolerated well with no complications.    Final Diagnoses: Same as principal problem.    Disposition: Home, self-care.    Follow up/Patient Instructions:  Follow-up in clinic in 2-3 weeks.    Medications: No medications were prescribed today. The patient was advised to resume normal medication regimen without change.  Specific information was provided regarding restarting any anticoagulant/s.    Discharge Procedure Orders (must include Diet, Follow-up, Activity):  Light activity for the remainder of the day, resume normal activity tomorrow. Resume normal diet. Follow-up in clinic in 2-3 weeks.

## 2019-05-30 NOTE — OP NOTE
PROCEDURE: Sacroiliac joint injection under fluoroscopic guidance     SIDE: right      PROCEDURE DATE: 5/30/2019    PREOPERATIVE DIAGNOSIS: Sacroiliitis  POSTOPERATIVE DIAGNOSIS: Sacroiliitis    PROVIDER: Gunnar Almanzar MD  Assistant(s): None    ANESTHESIA: Local, No Sedation    >> 0 mg of VERSED    >> 0 mcg of FENTANYL     INDICATION: The patient has a history of pain due to sacroiliitis unresponsive to conservative treatments. Fluoroscopy was used to optimize visualization of needle placement and to maximize safety.     PROCEDURE DESCRIPTION: The patient was seen and identified in the preoperative area. Risks, benefits, complications, and alternatives were discussed with the patient. The patient agreed to proceed with the procedure and signed the consent. The site and side of the procedure was identified and marked. An IV was not placed for this procedure.     The patient was taken to the procedural suite. The patient was positioned in prone orientation on procedure table. A time out was performed prior to any intervention. The procedure, site, side, and allergies were stated and agreed to by all present. The lumbosacral area was widely prepped with ChloraPrep. The procedural site was draped in usual sterile fashion. Vital signs were closely monitored throughout this procedure. Conscious sedation was not used for this procedure.    The fluoroscopic camera was placed in contralateral oblique view and was adjusted until the anterior and posterior joint margins of the targeted sacroiliac joint aligned in linear array. The lower pole of the joint was identified, marked, and localized with 1% Lidocaine. A 25 gauge 3.5 inch spinal needle was introduced and advanced to the joint under fluoroscopic guidance. The joint space was entered and after negative aspiration, 2 mL of injectate was posited into the joint space. The needle was then withdrawn outside of the joint space and after negative aspiration 1 mL of solution was  injected outside of the joint space. The injectant solution used was comprised of 2 mL of 1% PF Lidocaine and 1 mL of Methylprednisolone (40 mg/mL). This techniques was performed for the above noted joint/s.    Description of Findings: Not applicable    Prosthetic devices, grafts, tissues, or devices implanted: None    Specimen Removed: No    Estimated Blood Loss: minimal    COMPLICATIONS: None    DISPOSITION / PLANS: The patient was transferred to the recovery area in a stable condition for observation. The patient was reexamined prior to discharge. There was no evidence of acute neurologic injury following the procedure.  Patient was discharged from the recovery room after meeting discharge criteria. Home discharge instructions were given to the patient by the staff.

## 2019-06-13 ENCOUNTER — OFFICE VISIT (OUTPATIENT)
Dept: INTERNAL MEDICINE | Facility: CLINIC | Age: 84
End: 2019-06-13
Payer: MEDICARE

## 2019-06-13 ENCOUNTER — TELEPHONE (OUTPATIENT)
Dept: INTERNAL MEDICINE | Facility: CLINIC | Age: 84
End: 2019-06-13

## 2019-06-13 VITALS
OXYGEN SATURATION: 95 % | SYSTOLIC BLOOD PRESSURE: 122 MMHG | TEMPERATURE: 96 F | HEART RATE: 87 BPM | HEIGHT: 58 IN | DIASTOLIC BLOOD PRESSURE: 58 MMHG | BODY MASS INDEX: 27.72 KG/M2 | WEIGHT: 132.06 LBS

## 2019-06-13 DIAGNOSIS — M47.816 LUMBAR ARTHROPATHY: ICD-10-CM

## 2019-06-13 DIAGNOSIS — M47.26 OSTEOARTHRITIS OF SPINE WITH RADICULOPATHY, LUMBAR REGION: ICD-10-CM

## 2019-06-13 DIAGNOSIS — M53.3 SACROILIAC JOINT DYSFUNCTION: Primary | ICD-10-CM

## 2019-06-13 PROCEDURE — 99214 PR OFFICE/OUTPT VISIT, EST, LEVL IV, 30-39 MIN: ICD-10-PCS | Mod: HCNC,S$GLB,, | Performed by: FAMILY MEDICINE

## 2019-06-13 PROCEDURE — 99999 PR PBB SHADOW E&M-EST. PATIENT-LVL III: CPT | Mod: PBBFAC,HCNC,, | Performed by: FAMILY MEDICINE

## 2019-06-13 PROCEDURE — 3074F SYST BP LT 130 MM HG: CPT | Mod: HCNC,CPTII,S$GLB, | Performed by: FAMILY MEDICINE

## 2019-06-13 PROCEDURE — 99999 PR PBB SHADOW E&M-EST. PATIENT-LVL III: ICD-10-PCS | Mod: PBBFAC,HCNC,, | Performed by: FAMILY MEDICINE

## 2019-06-13 PROCEDURE — 3074F PR MOST RECENT SYSTOLIC BLOOD PRESSURE < 130 MM HG: ICD-10-PCS | Mod: HCNC,CPTII,S$GLB, | Performed by: FAMILY MEDICINE

## 2019-06-13 PROCEDURE — 99499 RISK ADDL DX/OHS AUDIT: ICD-10-PCS | Mod: HCNC,S$GLB,, | Performed by: FAMILY MEDICINE

## 2019-06-13 PROCEDURE — 99499 UNLISTED E&M SERVICE: CPT | Mod: HCNC,S$GLB,, | Performed by: FAMILY MEDICINE

## 2019-06-13 PROCEDURE — 1101F PT FALLS ASSESS-DOCD LE1/YR: CPT | Mod: HCNC,CPTII,S$GLB, | Performed by: FAMILY MEDICINE

## 2019-06-13 PROCEDURE — 3078F DIAST BP <80 MM HG: CPT | Mod: HCNC,CPTII,S$GLB, | Performed by: FAMILY MEDICINE

## 2019-06-13 PROCEDURE — 3078F PR MOST RECENT DIASTOLIC BLOOD PRESSURE < 80 MM HG: ICD-10-PCS | Mod: HCNC,CPTII,S$GLB, | Performed by: FAMILY MEDICINE

## 2019-06-13 PROCEDURE — 99214 OFFICE O/P EST MOD 30 MIN: CPT | Mod: HCNC,S$GLB,, | Performed by: FAMILY MEDICINE

## 2019-06-13 PROCEDURE — 1101F PR PT FALLS ASSESS DOC 0-1 FALLS W/OUT INJ PAST YR: ICD-10-PCS | Mod: HCNC,CPTII,S$GLB, | Performed by: FAMILY MEDICINE

## 2019-06-13 RX ORDER — MELOXICAM 15 MG/1
15 TABLET ORAL DAILY
Qty: 30 TABLET | Refills: 5 | Status: SHIPPED | OUTPATIENT
Start: 2019-06-13 | End: 2019-08-27

## 2019-06-13 RX ORDER — GABAPENTIN 300 MG/1
300 CAPSULE ORAL 2 TIMES DAILY
Qty: 60 CAPSULE | Refills: 1 | Status: SHIPPED | OUTPATIENT
Start: 2019-06-13 | End: 2019-08-27

## 2019-06-13 RX ORDER — TIZANIDINE 4 MG/1
4 TABLET ORAL 2 TIMES DAILY PRN
Qty: 60 TABLET | Refills: 0 | Status: SHIPPED | OUTPATIENT
Start: 2019-06-13 | End: 2019-07-11 | Stop reason: SDUPTHER

## 2019-06-13 NOTE — PROGRESS NOTES
Isamar Roe  06/13/2019  [id number]    Marina Garibay MD  Patient Care Team:  Marina Garibay MD as PCP - General (Family Medicine)  CASEY Giang OD as Consulting Physician (Optometry)  Reinaldo Noriega III, MD (Dermatology)  Leticia Maya MD as Consulting Physician (Endocrinology)  Oscar Hanson LPN as Care Coordinator (Internal Medicine)  Has the patient seen any provider outside of the [facility] network since the last visit? (no). If yes, HIPPA forms completed and records requested.        Visit Type:an urgent visit for a new problem    Chief Complaint:  Chief Complaint   Patient presents with    Back Pain     lower back on right side and right leg       History of Present Illness:  84 year old here for pain in back and lower legs right side.  She reports right side, radiates all the way down to to foot. Cannot sleep on right side.  No bowel or bladder issues.  No numbness or tingling.     She has history of HTN.  She has history of elevated cholesterol, and intolerance to statin.  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      She has osteoporosis. Offered Bisphosphonate and deferred treatment.  Last Dexa in 2019.  She does take calcium and Vit D.     Lipids scheduled for August.     She has seen Dr. Almanzar. SI joint inflammation. She has another injection this month.  She had MRI, and is doing injection.  She hasn't been able to exercise.   Denies any weakness.   She is here today for the same pain in her lower back and in her legs.  Pain has been wrosening over last 6 months.   She has been given SI joint inection. And TFESI as well.   Last visit with pain management was in April.  She reports MRI done this year, at external imaging off [facility], I do not see records.  [facility] had [facility].     She reports that the pain is worse since her last injection.    History:  Past Medical History:   Diagnosis Date    Arthritis     hands    Benign hematuria     shingleton    Calcific tendinitis of left  shoulder 1/27/2016    Cataract     Diverticulosis     colonoscopy 5/22/2012    Duodenal diverticulum     EGD 5/22/2012    GERD (gastroesophageal reflux disease)     Hemorrhoids     colonoscopy 5/22/2012    History of skin cancer     per patient s/p excision, no chemo or radiation, sees outside derm, Dr. Tinsley.    Hyperlipidemia     Hypertension     Impingement syndrome of left shoulder 1/27/2016    Osteoporosis     declines tx;11/17/16 phone note    Pneumonia     as a child    Sciatica     [place]; dr castillo    Skin cancer     Dr. Noriega    Vitamin D deficiency disease      Past Surgical History:   Procedure Laterality Date    APPENDECTOMY      CARPAL TUNNEL RELEASE Bilateral     CATARACT EXTRACTION W/  INTRAOCULAR LENS IMPLANT Left 05/01/2019    CATARACT EXTRACTION W/  INTRAOCULAR LENS IMPLANT Right 05/15/2019    CHOLECYSTECTOMY      FACETECTOMY Left 9/16/2014    Performed [facility] at [facility] OR 2ND FLR    HYSTERECTOMY      joint removal in right thumb      LAMINECTOMY-ANGELIA-SEMI Left 9/16/2014    Performed by [facility] at [facility] OR 2ND FLR    MICRODISKECTOMY-MINIMALLY INVASIVE Left 9/16/2014    Performed by [facility] at [facility] OR 2ND FLR    SKIN CANCER EXCISION      SPINE SURGERY  9/16/14    L4/5 laminectomy;dr lozada    TOE SURGERY      LT FIFTH     Family History   Problem Relation Age of Onset    Heart disease Mother     Hypertension Mother     Cataracts Mother     Heart disease Father     Hypertension Father     Cataracts Father     Heart disease Brother 45    Heart disease Sister         A-fib    Heart disease Sister         heart bypass x 5    Kidney disease Brother 70    Heart disease Brother     Cancer Maternal Aunt         breast    Diabetes Maternal Grandmother     Diabetes Maternal Aunt     Hypertension Other         multiple family members    Cancer Other         multiple with skin cancer    Stroke Neg Hx     Melanoma Neg Hx      Social History      Socioeconomic History    Marital status:      Spouse name: AMBREEN    Number of children: 4    Years of education: Not on file    Highest education level: Not on file   Occupational History    Not on file   Social Needs    Financial resource strain: Not on file    Food insecurity:     Worry: Not on file     Inability: Not on file    Transportation needs:     Medical: Not on file     Non-medical: Not on file   Tobacco Use    Smoking status: Never Smoker    Smokeless tobacco: Never Used   Substance and Sexual Activity    Alcohol use: No    Drug use: No    Sexual activity: Yes     Partners: Male     Birth control/protection: See Surgical Hx   Lifestyle    Physical activity:     Days per week: Not on file     Minutes per session: Not on file    Stress: Not on file   Relationships    Social connections:     Talks on phone: Not on file     Gets together: Not on file     Attends Muslim service: Not on file     Active member of club or organization: Not on file     Attends meetings of clubs or organizations: Not on file     Relationship status: Not on file   Other Topics Concern    Are you pregnant or think you may be? No    Breast-feeding No   Social History Narrative    Wears a seatbelt.     Patient Active Problem List   Diagnosis    HTN (hypertension)    Hyperlipidemia    Hiatal hernia with gastroesophageal reflux    Lumbar arthropathy    Osteoporosis    Nuclear sclerosis of both eyes    Choroidal nevus of right eye    Arteriosclerosis of aorta    Heart valve regurgitation    History of skin cancer    Maxillary sinusitis    Statin intolerance    Pseudophakia    Sacroiliac joint dysfunction     Review of patient's allergies indicates:   Allergen Reactions    Ace inhibitors      Other reaction(s): cough    Amlodipine      Leg swelling    Chlorthalidone      Other reaction(s): ? dizzy  Other reaction(s): ? dizzy    Codeine Nausea And Vomiting    Demerol (pf)  [meperidine  (pf)]      Other reaction(s): Itching    Fosamax  [alendronate]      Other reaction(s): aches    Fosamax plus d  [alendronate-vitamin d3]      Other reaction(s): aches    Hydrochlorothiazide (bulk)      Other reaction(s): bad taste    Hydrocodone-acetaminophen      Other reaction(s): Stomach upset  Other reaction(s): Nausea    Ibandronate      Other reaction(s): aches  Other reaction(s): aches    Ibandronate sodium      Other reaction(s): Unknown    Losartan Diarrhea    Meperidine Itching    Opioids - morphine analogues     Opium     Statins-hmg-coa reductase inhibitors      Few statins intol    Toprol xl  [metoprolol succinate]      Other reaction(s): diarrhea  Other reaction(s): diarrhea    Tramadol        The following were reviewed at this visit: active problem list, medication list, allergies, family history, social history, and health maintenance.    Medications:  Current Outpatient Medications on File Prior to Visit   Medication Sig Dispense Refill    calcium-vitamin D 250-100 mg-unit per tablet Take 1 tablet by mouth.      verapamil (CALAN) 120 MG tablet Take 1 tablet (120 mg total) by mouth 2 (two) times daily. 60 tablet 5    vitamin E 400 UNIT capsule Take 400 Units by mouth once daily.      [DISCONTINUED] amoxicillin-clavulanate 875-125mg (AUGMENTIN) 875-125 mg per tablet Take 1 tablet by mouth 2 (two) times daily. 20 tablet 0    [DISCONTINUED] gatifloxacin 0.5 % Drop drops Place 1 drop into the right eye 2 (two) times daily. Eyedrops to start one day before surgery 1 Bottle 2    [DISCONTINUED] ketorolac 0.5% (ACULAR) 0.5 % Drop Place 1 drop into the left eye 4 (four) times daily. Eyedrops to start one day before surgery 1 Bottle 2    [DISCONTINUED] ketorolac 0.5% (ACULAR) 0.5 % Drop Place 1 drop into the right eye 4 (four) times daily. Eyedrops to start one day before surgery 1 Bottle 2    [DISCONTINUED] prednisoLONE acetate (PRED FORTE) 1 % DrpS Place 1 drop into the left eye 4  (four) times daily. Start one day before surgery 5 mL 2    [DISCONTINUED] prednisoLONE acetate (PRED FORTE) 1 % DrpS Place 1 drop into the right eye 4 (four) times daily. Eyedrops to start one day before surgery 1 Bottle 2     No current facility-administered medications on file prior to visit.        Medications have been reviewed and reconciled with patient at this visit.  Barriers to medications present (no)    Adverse reactions to current medications (no)    Over the counter medications reviewed (Yes ), and if needed added to active Medication list at this visit.     Exam:  Wt Readings from Last 3 Encounters:   06/13/19 59.9 kg (132 lb 0.9 oz)   05/30/19 60.8 kg (134 lb)   05/13/19 61.6 kg (135 lb 12.9 oz)     Temp Readings from Last 3 Encounters:   06/13/19 96 °F (35.6 °C) (Tympanic)   05/13/19 96.3 °F (35.7 °C) (Tympanic)   04/30/19 97.4 °F (36.3 °C) (Tympanic)     BP Readings from Last 3 Encounters:   06/13/19 (!) 122/58   05/30/19 (!) 196/81   05/13/19 114/60     Pulse Readings from Last 3 Encounters:   06/13/19 87   05/30/19 78   05/13/19 76     Body mass index is 27.6 kg/m².      Review of Systems   Constitutional: Negative.  Negative for chills and fever.   HENT: Negative.  Negative for congestion, sinus pain and sore throat.    Eyes: Negative for blurred vision and double vision.   Respiratory: Negative for cough, sputum production, shortness of breath and wheezing.    Cardiovascular: Negative for chest pain, palpitations and leg swelling.   Gastrointestinal: Negative for abdominal pain, constipation, diarrhea, heartburn, nausea and vomiting.   Genitourinary: Negative.    Musculoskeletal: Positive for back pain and joint pain.   Skin: Negative.  Negative for rash.   Neurological: Negative.    Endo/Heme/Allergies: Negative.  Negative for polydipsia. Does not bruise/bleed easily.   Psychiatric/Behavioral: Negative for depression and substance abuse.     Physical Exam   Constitutional: She is oriented to  person, place, and time. She appears well-developed and well-nourished. No distress.   HENT:   Head: Normocephalic and atraumatic.   Right Ear: External ear normal.   Left Ear: External ear normal.   Nose: Nose normal.   Mouth/Throat: Oropharynx is clear and moist. No oropharyngeal exudate.   Eyes: Pupils are equal, round, and reactive to light. Conjunctivae and EOM are normal. Right eye exhibits no discharge. Left eye exhibits no discharge.   Neck: Normal range of motion. Neck supple. No thyromegaly present.   Cardiovascular: Normal rate, regular rhythm, normal heart sounds and intact distal pulses.   No murmur heard.  Pulmonary/Chest: Effort normal and breath sounds normal. No respiratory distress. She has no wheezes.   Abdominal: Soft. Bowel sounds are normal. She exhibits no distension and no mass. There is no tenderness.   Musculoskeletal: Normal range of motion. She exhibits tenderness. She exhibits no edema.        Lumbar back: She exhibits tenderness. She exhibits no swelling.        Back:    Normal reflex  No sensory deficits     Lymphadenopathy:     She has no cervical adenopathy.   Neurological: She is alert and oriented to person, place, and time. No cranial nerve deficit.   Skin: Capillary refill takes less than 2 seconds. She is not diaphoretic.   Psychiatric: She has a normal mood and affect. Her behavior is normal. Judgment and thought content normal.   Nursing note and vitals reviewed.      Laboratory Reviewed ({Yes)  Lab Results   Component Value Date    WBC 5.56 04/18/2018    HGB 13.6 04/18/2018    HCT 43.3 04/18/2018     04/18/2018    CHOL 277 (H) 08/30/2018    TRIG 114 08/30/2018    HDL 71 08/30/2018    ALT 12 08/30/2018    AST 14 08/30/2018     08/30/2018    K 4.3 08/30/2018     08/30/2018    CREATININE 0.7 08/30/2018    BUN 19 08/30/2018    CO2 26 08/30/2018    TSH 1.292 07/10/2013    INR 1.0 02/02/2015       Isamar was seen today for back pain.    Diagnoses and all orders  for this visit:    Sacroiliac joint dysfunction  -     gabapentin (NEURONTIN) 300 MG capsule; Take 1 capsule (300 mg total) by mouth 2 (two) times daily.  -     meloxicam (MOBIC) 15 MG tablet; Take 1 tablet (15 mg total) by mouth once daily.    Lumbar arthropathy  -     gabapentin (NEURONTIN) 300 MG capsule; Take 1 capsule (300 mg total) by mouth 2 (two) times daily.  -     meloxicam (MOBIC) 15 MG tablet; Take 1 tablet (15 mg total) by mouth once daily.    Osteoarthritis of spine with radiculopathy, lumbar region  -     gabapentin (NEURONTIN) 300 MG capsule; Take 1 capsule (300 mg total) by mouth 2 (two) times daily.  -     meloxicam (MOBIC) 15 MG tablet; Take 1 tablet (15 mg total) by mouth once daily.    Need Copy of MRI  Need to discuss with Pain Dashawn. Last injection didn't work  I restarted Neurontin and Mobic for pain.  She will await contact back from Pain    Needs to see if Neuro Sx consult is needed.            Care Plan/Goals: Reviewed  (N/A)  Goals     None          Follow up: No follow-ups on file.    After visit summary was printed and given to patient upon discharge today.  Patient goals and care plan are included in After Visit Summary.

## 2019-06-21 ENCOUNTER — OFFICE VISIT (OUTPATIENT)
Dept: PAIN MEDICINE | Facility: CLINIC | Age: 84
End: 2019-06-21
Payer: MEDICARE

## 2019-06-21 ENCOUNTER — OFFICE VISIT (OUTPATIENT)
Dept: INTERNAL MEDICINE | Facility: CLINIC | Age: 84
End: 2019-06-21
Payer: MEDICARE

## 2019-06-21 VITALS
SYSTOLIC BLOOD PRESSURE: 132 MMHG | BODY MASS INDEX: 27.01 KG/M2 | HEIGHT: 59 IN | WEIGHT: 134 LBS | RESPIRATION RATE: 18 BRPM | DIASTOLIC BLOOD PRESSURE: 71 MMHG | HEART RATE: 79 BPM

## 2019-06-21 VITALS
DIASTOLIC BLOOD PRESSURE: 72 MMHG | HEIGHT: 59 IN | BODY MASS INDEX: 27.15 KG/M2 | HEART RATE: 76 BPM | SYSTOLIC BLOOD PRESSURE: 122 MMHG | OXYGEN SATURATION: 97 % | WEIGHT: 134.69 LBS

## 2019-06-21 DIAGNOSIS — Z78.9 STATIN INTOLERANCE: ICD-10-CM

## 2019-06-21 DIAGNOSIS — Z13.31 POSITIVE DEPRESSION SCREENING: ICD-10-CM

## 2019-06-21 DIAGNOSIS — Z00.00 ENCOUNTER FOR PREVENTIVE HEALTH EXAMINATION: Primary | ICD-10-CM

## 2019-06-21 DIAGNOSIS — E78.5 HYPERLIPIDEMIA, UNSPECIFIED HYPERLIPIDEMIA TYPE: Chronic | ICD-10-CM

## 2019-06-21 DIAGNOSIS — I10 ESSENTIAL HYPERTENSION: Chronic | ICD-10-CM

## 2019-06-21 DIAGNOSIS — Z85.828 HISTORY OF SKIN CANCER: ICD-10-CM

## 2019-06-21 DIAGNOSIS — M47.816 LUMBAR SPONDYLOSIS: ICD-10-CM

## 2019-06-21 DIAGNOSIS — M47.26 OSTEOARTHRITIS OF SPINE WITH RADICULOPATHY, LUMBAR REGION: ICD-10-CM

## 2019-06-21 DIAGNOSIS — I70.0 ARTERIOSCLEROSIS OF AORTA: ICD-10-CM

## 2019-06-21 DIAGNOSIS — M85.80 OSTEOPENIA, UNSPECIFIED LOCATION: ICD-10-CM

## 2019-06-21 DIAGNOSIS — M54.16 LUMBAR RADICULOPATHY: Primary | ICD-10-CM

## 2019-06-21 PROCEDURE — 99214 PR OFFICE/OUTPT VISIT, EST, LEVL IV, 30-39 MIN: ICD-10-PCS | Mod: HCNC,S$GLB,, | Performed by: ANESTHESIOLOGY

## 2019-06-21 PROCEDURE — 3075F SYST BP GE 130 - 139MM HG: CPT | Mod: HCNC,CPTII,S$GLB, | Performed by: ANESTHESIOLOGY

## 2019-06-21 PROCEDURE — 3078F PR MOST RECENT DIASTOLIC BLOOD PRESSURE < 80 MM HG: ICD-10-PCS | Mod: HCNC,CPTII,S$GLB, | Performed by: NURSE PRACTITIONER

## 2019-06-21 PROCEDURE — 1101F PT FALLS ASSESS-DOCD LE1/YR: CPT | Mod: HCNC,CPTII,S$GLB, | Performed by: ANESTHESIOLOGY

## 2019-06-21 PROCEDURE — 3074F SYST BP LT 130 MM HG: CPT | Mod: HCNC,CPTII,S$GLB, | Performed by: NURSE PRACTITIONER

## 2019-06-21 PROCEDURE — 3078F DIAST BP <80 MM HG: CPT | Mod: HCNC,CPTII,S$GLB, | Performed by: NURSE PRACTITIONER

## 2019-06-21 PROCEDURE — 99214 OFFICE O/P EST MOD 30 MIN: CPT | Mod: HCNC,S$GLB,, | Performed by: ANESTHESIOLOGY

## 2019-06-21 PROCEDURE — 3078F PR MOST RECENT DIASTOLIC BLOOD PRESSURE < 80 MM HG: ICD-10-PCS | Mod: HCNC,CPTII,S$GLB, | Performed by: ANESTHESIOLOGY

## 2019-06-21 PROCEDURE — 3078F DIAST BP <80 MM HG: CPT | Mod: HCNC,CPTII,S$GLB, | Performed by: ANESTHESIOLOGY

## 2019-06-21 PROCEDURE — G0439 PPPS, SUBSEQ VISIT: HCPCS | Mod: HCNC,S$GLB,, | Performed by: NURSE PRACTITIONER

## 2019-06-21 PROCEDURE — 3074F PR MOST RECENT SYSTOLIC BLOOD PRESSURE < 130 MM HG: ICD-10-PCS | Mod: HCNC,CPTII,S$GLB, | Performed by: NURSE PRACTITIONER

## 2019-06-21 PROCEDURE — 99999 PR PBB SHADOW E&M-EST. PATIENT-LVL III: CPT | Mod: PBBFAC,HCNC,, | Performed by: NURSE PRACTITIONER

## 2019-06-21 PROCEDURE — G0439 PR MEDICARE ANNUAL WELLNESS SUBSEQUENT VISIT: ICD-10-PCS | Mod: HCNC,S$GLB,, | Performed by: NURSE PRACTITIONER

## 2019-06-21 PROCEDURE — 3075F PR MOST RECENT SYSTOLIC BLOOD PRESS GE 130-139MM HG: ICD-10-PCS | Mod: HCNC,CPTII,S$GLB, | Performed by: ANESTHESIOLOGY

## 2019-06-21 PROCEDURE — 99999 PR PBB SHADOW E&M-EST. PATIENT-LVL III: ICD-10-PCS | Mod: PBBFAC,HCNC,, | Performed by: ANESTHESIOLOGY

## 2019-06-21 PROCEDURE — 99999 PR PBB SHADOW E&M-EST. PATIENT-LVL III: CPT | Mod: PBBFAC,HCNC,, | Performed by: ANESTHESIOLOGY

## 2019-06-21 PROCEDURE — 1101F PR PT FALLS ASSESS DOC 0-1 FALLS W/OUT INJ PAST YR: ICD-10-PCS | Mod: HCNC,CPTII,S$GLB, | Performed by: ANESTHESIOLOGY

## 2019-06-21 PROCEDURE — 99999 PR PBB SHADOW E&M-EST. PATIENT-LVL III: ICD-10-PCS | Mod: PBBFAC,HCNC,, | Performed by: NURSE PRACTITIONER

## 2019-06-21 NOTE — PROGRESS NOTES
"Isamar Roe presented for a  Medicare AWV and comprehensive Health Risk Assessment today. The following components were reviewed and updated:    · Medical history  · Family History  · Social history  · Allergies and Current Medications  · Health Risk Assessment  · Health Maintenance  · Care Team     ** See Completed Assessments for Annual Wellness Visit within the encounter summary.**       The following assessments were completed:  · Living Situation  · CAGE  · Depression Screening  · Timed Get Up and Go  · Whisper Test  · Cognitive Function Screening  · Nutrition Screening  · ADL Screening  · PAQ Screening    Vitals:    06/21/19 0843   BP: 122/72   BP Location: Left arm   Patient Position: Sitting   BP Method: Medium (Manual)   Pulse: 76   SpO2: 97%   Weight: 61.1 kg (134 lb 11.2 oz)   Height: 4' 11.06" (1.5 m)     Body mass index is 27.16 kg/m².  Physical Exam   Constitutional: She is oriented to person, place, and time. She appears well-developed and well-nourished.   HENT:   Head: Normocephalic.   Cardiovascular: Normal rate, regular rhythm and normal heart sounds.   No murmur heard.  Pulses:       Dorsalis pedis pulses are 2+ on the right side, and 2+ on the left side.   Pulmonary/Chest: Effort normal. No respiratory distress.   Inspiratory rales noted to left upper lobe, otherwise lung sounds clear. She is in no acute distress. She is asymptomatic. Reports she is at her respiratory baseline. She knows to be seen if any symptoms develop.    Abdominal: Soft. She exhibits no mass. There is no tenderness.   Musculoskeletal: Normal range of motion. She exhibits no edema.   Neurological: She is alert and oriented to person, place, and time. She exhibits normal muscle tone.   Skin: Skin is warm, dry and intact.   Psychiatric: She has a normal mood and affect. Her speech is normal and behavior is normal.   Nursing note and vitals reviewed.        Diagnoses and health risks identified today and associated " recommendations/orders:    1. Encounter for preventive health examination  She will discuss Tetanus vaccine with PCP.     2. Positive depression screening  PHQ 9-9. Denies SI.   Advised to follow up with PCP for further evaluation and treatment. She expressed understanding.        3. Essential hypertension  Stable and controlled. Continue current treatment plan as previously prescribed with your PCP.     4. Hyperlipidemia, unspecified hyperlipidemia type  Not at goal. Intolerant to statin medications. Discussed dietary changes.  Continue current treatment plan as previously prescribed with your PCP.     5. Statin intolerance  See #4    6. Arteriosclerosis of aorta  Ct abdomen/pelvis 4/2/2012  Discussed diagnosis and risk reduction.   Stable and controlled. Continue current treatment plan as previously prescribed with your PCP.      7. Osteopenia, unspecified location  DEXA 2/4/2019  Advised to follow up with PCP for further evaluation and recommendations. She expressed understanding.      8. History of skin cancer  Continue current treatment plan as previously prescribed with your outside dermatologist.       Provided Isamar with a 5-10 year written screening schedule and personal prevention plan. Education, counseling, and referrals were provided as needed. After Visit Summary printed and given to patient which includes a list of additional screenings\tests needed.    Follow up in about 1 year (around 6/21/2020) for AWV.    Melisa Rosenberg NP  I offered to discuss end of life issues, including information on how to make advance directives that the patient could use to name someone who would make medical decisions on their behalf if they became too ill to make themselves.    ___Patient declined  _X_Patient is interested, I provided paper work and offered to discuss.   3

## 2019-06-21 NOTE — PROGRESS NOTES
Chief Pain Complaint:  Low-back Pain        History of Present Illness:   Isamar Roe is a 84 y.o. female  who is presenting with a chief complaint of Low-back Pain  . The patient began experiencing this problem abruptly, and the pain has been gradually worsening over the past 3 month(s). The pain is described as throbbing, shooting, burning and electrical and is located in the right lumbar spine. Pain is intermittent and lasts hours. The pain radiates to right leg L4 distribution. The patient rates her pain a 9 out of ten and interferes with activities of daily living a 8 out of ten. Pain is exacerbated by flexion of the lumbar spine, and is improved by rest. Patient reports no prior trauma, prior lumbar surgery  Minimally invasive left L4-L5 hemilaminotomy, foraminotomy   and resection of synovial cyst with Dr Lozada on 9/2014.     - pertinent negatives: No fever, No chills, No weight loss, No bladder dysfunction, No bowel dysfunction, No saddle anesthesia  - pertinent positives: none    - medications, other therapies tried (physical therapy, injections):     >> NSAIDs, Tylenol, gabapentin and zanaflex    >> Has previously undergone Physical Therapy    >> Has previously undergone spinal injection/s         Left Lumbar TFESI x3 in 2014 with Dr Maldonado with minimal relief          Right L4-5, L5-S1 TFESI on 4/3/19 with 70% relief of leg pain. Right Buttock pain still resists.          Right SI joint and Right GTB on 5/30/19 with minimal relief.     Imaging / Labs / Studies (reviewed on 6/21/2019):    Results for orders placed during the hospital encounter of 08/11/14   MRI Lumbar Spine Without Contrast    Narrative Technique: Sagittal T1, sagittal T2, sagittal STIR, axial T1, and axial T2 weighted images of the lumbar spine were obtained without contrast.    Comparison: Lumbar spine MRI 7/5/13    Findings:    There is grade I anterolisthesis of L3 on L4 and L4 on L5.  The vertebral body heights are well maintained,  with no fracture.  There is no marrow signal abnormality suspicious for infiltrative process. A hemangioma is present within the T12 vertebral   body.  There is desiccation throughout the lumbar spine most significantly at L3-4 and L4-5.  The conus is normal in appearance and terminates at the L1 level.  The adjacent soft tissue structures show no significant abnormalities.    L1-L2: There is no focal disk herniation, significant spinal canal stenosis, or neural foraminal narrowing.    L2-L3: There is no focal disk herniation, significant spinal canal stenosis, or neural foraminal narrowing.    L3-L4: Grade I anterolisthesis with uncovering of the disk, broad based disk bulge, facet arthropathy, and ligamentum flavum hypertrophy resulting in moderate spinal canal stenosis and mild bilateral neural foraminal narrowing.    L4-L5: Grade I anterolisthesis with uncovering of the disk, broad based disk bulge, facet arthropathy, and ligamentum flavum hypertrophy resulting in mild spinal canal stenosis and mild bilateral neural foraminal narrowing. A 7 mm cyst remains present   along the superior medial aspect of the left foramen previously 12 mm).    L5-S1: A 10 mm cyst remains present in the inferior aspect of the right neural foramen (previously 13 mm). A 7 mm cyst is also present in the inferior aspect of the left neural foramen. There is no focal disk herniation, significant spinal canal   stenosis, or neural foraminal narrowing.    Impression 1.  Multilevel degenerative changes of the lumbar spine as above most significantly with grade I anterolisthesis at L3-4 and L4-5, moderate spinal canal stenosis at L3-4, and mild spinal canal stenosis at L4-5.    2.  Slight interval decrease in size of the previously identified prominent cysts within the left foramen at L4-5 and bilaterally at L5-S1.  ______________________________________     Electronically signed by resident: Anna Yang  Date:      08/11/14  Time:    11:20          As the supervising and teaching physician, I personally reviewed the images and resident's interpretation and I agree with the findings.          Electronically signed by: Dr. Aden Benz MD  Date:     08/11/14  Time:    12:37      Results for orders placed during the hospital encounter of 11/22/14   MRI Lumbar Spine W WO Cont    Narrative Exam: MRI of the lumbar spine without contrast.    History:     Low back pain. Radiculopathy.  Status post surgery in September 2014.      Comparison: Prior study dated 08/11/14.    Findings:     Postoperative changes are noted at L4 -- 5, with laminotomy on the left.  Within the left posterolateral epidural space, there is a 2-cm fluid collection, exerting mass effect on the thecal sac, causing marked central canal stenosis, and   probable left L5 neural impingement.  Differential considerations include recurrent synovial cyst as well as postoperative hematoma/seroma.  Abscess not excluded, but felt to be less likely.  Mild rim enhancement is present.  An additional smaller rim   enhancing fluid collection is noted in the deep left posterolateral soft tissues, within normal limits given surgical history.    Otherwise, no significant change from prior study.  No compression fracture identified.  Degenerative anterolisthesis of L3 on L4 again noted.    Impression      Status post left L4-5 laminotomy, with 2-cm fluid collection in the left posterolateral epidural space, causing marked central canal stenosis and probable left L5 neural impingement.  See above additional comments and differential considerations.      Electronically signed by: KOKO ALAN MD  Date:     11/22/14  Time:    14:55        Results for orders placed during the hospital encounter of 11/22/14   CT Lumbar Spine Without Contrast    Narrative Exam: CT of the lumbar spine without contrast.    History:  Back pain.    Findings: Comparison is made to prior exam dated  08/11/14.  No significant change.  Grade 1 degenerative spondylolisthesis of L3 on L4 again noted.  No compression fracture.  Recommend MR of the lumbar spine for more definitive assessment if indicated.    Impression      No compression fracture.  No significant change from prior study.      Electronically signed by: KOKO ALAN MD  Date:     11/22/14  Time:    12:25        Results for orders placed in visit on 02/07/11   X-Ray Lumbar Spine Complete 5 View    Narrative DATE OF EXAM: Feb 7 2011      [facility], [place]   [id number]  -  L-SPINE 4 VIEWS MINIMUM:     [id number]     CLINICAL HISTORY:   724.2 0 LUMBAGO     PROCEDURE COMMENT:        ICD 9 CODE(S):   ()     CPT 4 CODE(S)/MODIFIER(S):   ()     RESULTS: THIS STUDY DEMONSTRATES NARROWING AT THE L4-5 DISC SPACE.    PEDICLES ARE INTACT.  THERE ARE MARGINAL OSTEOPHYTES PROJECTING OFF THE   END PLATES OF L4, L3, L2.  A LARGE AMOUNT OF STOOL IN THE COLON.  CLIPS   IN THE RIGHT UPPER QUADRANT.     IMPRESSION: MILD DEGENERATIVE DISC DISEASE OF THE LUMBAR SPINE.        : ESTELA  Transcribe Date/Time: Feb 7 2011 12:11P  Dictated by : DONNA LOAIZA DR  Report reviewed by:   Read On: Feb 7 2011 11:30A  DONNA LOAIZA M.D.  [id number]  Images were reviewed, findings were verified and document was   electronically  SIGNED BY: DONNA LOAIZA DR On: Feb 8 2011  4:40P        Results for orders placed during the hospital encounter of 06/16/14   X-Ray Lumbar Complete With Flex And Ext    Narrative Findings: There is generalized osteopenia and mild left convex spinal curvature.  Degenerative vertebral endplate spurring present at multiple levels with bilateral facet arthropathy inferiorly.  Mild narrowing of the L3-4 and L4-5 disk spaces with   minimal degree of grade 1 anterolisthesis at the same levels.  Pedicles grossly intact.  No compression fracture.    Impression  As above.      Electronically signed by: MARCOS RPESCOTT MD  Date:     06/16/14  Time:    09:53      Results for  orders placed during the hospital encounter of 06/24/13   X-Ray Lumbar Spine AP And Lateral    Narrative Comparison: 02/07/11    Findings:    There is equivocal slight anterior subluxation L3 on L4 and L4 on L5.  Otherwise essentially stable exam.  Osteopenia, mild scoliosis and spondylosis again noted.  No acute fracture or subluxation.  Most prominent facet arthropathy at the L3 -- 4 through   L5 -- S1 levels.  Multilevel loss of diskal height again noted.  Vascular calcifications and cholecystectomy clips noted.  Multiple calcifications lower pelvis bilaterally.    Impression       1.  Prominent spondylosis again noted with the appearance of minimal grade 1 anterolisthesis L3 on L4 and L4 on L5 is now noted.  Correlate clinically with further evaluation and/or follow-up imaging as warranted.  See above.        Electronically signed by: EDWIGE TOURE III, MD  Date:     06/24/13  Time:    11:03      Results for orders placed during the hospital encounter of 03/30/16   X-Ray Cervical Spine AP And Lateral    Narrative Three views of the cervical spine    Findings: The vertebral bodies demonstrate normal height.  The alignment is within normal limits. There is severe disc space narrowing and spondylosis noted at the C4-5 level.  There is mild/moderate disc space narrowing and spondylosis noted at the C5-6 level.  There is grade 1 spondylolisthesis of C6 on C7.  Minimal spondylosis is noted anteriorly at this level. The C1-C2 articulation is within normal limits. No prevertebral soft tissue swelling.  Generalized osteopenia is noted.    Impression  As above  ______________________________________     Electronically signed by: MAHESH CHAO D.O.  Date:     03/30/16  Time:    11:27      Review of Systems:  CONSTITUTIONAL: patient denies any fever, chills, or weight loss  SKIN: patient denies any rash or itching  RESPIRATORY: patient denies having any shortness of breath  GASTROINTESTINAL: patient denies having any  "diarrhea, constipation, or bowel incontinence  GENITOURINARY: patient denies having any abnormal bladder function    MUSCULOSKELETAL:  - patient complains of the above noted pain/s (see chief pain complaint)    NEUROLOGICAL:   - pain as above  - strength in Lower extremities is intact, BILATERALLY  - sensation in Lower extremities is intact, BILATERALLY  - patient denies any loss of bowel or bladder control      PSYCHIATRIC: patient denies any change in mood    Other:  All other systems reviewed and are negative      Physical Exam:  /71 (BP Location: Left arm, Patient Position: Sitting, BP Method: Medium (Automatic))   Pulse 79   Resp 18   Ht 4' 11" (1.499 m)   Wt 60.8 kg (134 lb)   BMI 27.06 kg/m²  (reviewed on 6/21/2019)  General: Alert and oriented, in no apparent distress.  Gait: normal gait.  Skin: No rashes, No discoloration, No obvious lesions  HEENT: Normocephalic, atraumatic. Pupils equal and round.  Cardiovascular: Regular rate and rhythm , no significant peripheral edema present  Respiratory: Without audible wheezing, without use of accessory muscles of respiration.    Musculoskeletal:    Lumbar Spine    - Pain on flexion of lumbar spine Present  - Straight Leg Raise:  Present on right     - Pain on extension of lumbar spine Absent  - TTP over the lumbar facet joints Absent  - Lumbar facet loading Absent    -Pain on palpation over the SI joint  Present on right   - RADHA: Present       Neuro:    Strength:  LE R/L: HF: 5/5, HE: 5/5, KF: 5/5; KE: 5/5; FE: 5/5; FF: 5/5    Extremity Reflexes: Brisk and symmetric throughout.      Extremity Sensory: Sensation to pinprick and temperature symmetric. Proprioception intact.      Psych:  Mood and affect is appropriate      Assessment:    Isamar Roe is a 84 y.o. year old female who is presenting with     Encounter Diagnoses   Name Primary?    Lumbar spondylosis     Lumbar radiculopathy Yes    Osteoarthritis of spine with radiculopathy, lumbar region  "       Plan:    1. Interventional: None for now. Consider Right L4-5 TFESI again.     Patient for Right SI joint and Right GTB on 5/30/19 with minimal relief.     Patient s/p Right L4-5, L5-S1 TFESI on 4/319 with 70% relief of leg pain. Right Buttock pain still resists.     2. Pharmacologic: Wean off Gabapentin from 600 mg Po to 300 mg PO QHs. Tizanidine 4 mg PO BID PRN.     3. Rehabilitative: Encouraged PT.    4. Diagnostic: Lumbar MRI reviewed with patient.     5. Consult: Referral to Neurosurgery.     6.  Follow up: PRN.      20 minutes were spent in this encounter with more than 50% of the time used for counseling and review of the plan.  Imaging / studies reviewed, detailed above.  I discussed in detail the risks, benefits, and alternatives to any and all potential treatment options.  All questions and concerns were fully addressed today in clinic. Medical decision making moderate.    Thank you for the opportunity to assist in the care of this patient.    Best wishes,    Signed:    Gunnar Almanzar MD          Disclaimer:  This note may have been prepared using voice recognition software, it may have not been extensively proofed, as such there could be errors within the text such as sound alike errors.

## 2019-06-21 NOTE — PATIENT INSTRUCTIONS
Counseling and Referral of Other Preventative  (Italic type indicates deductible and co-insurance are waived)    Patient Name: Isamar Roe  Today's Date: 6/21/2019    Health Maintenance       Date Due Completion Date    TETANUS VACCINE 06/25/2018 6/25/2008    Shingles Vaccine (3 of 3) 07/08/2019 5/13/2019    Influenza Vaccine 08/01/2019 12/20/2018    Lipid Panel 08/30/2019 8/30/2018    DEXA SCAN 02/04/2021 2/4/2019    Override on 7/5/2012: Done (Recheck 2 years)        No orders of the defined types were placed in this encounter.    The following information is provided to all patients.  This information is to help you find resources for any of the problems found today that may be affecting your health:                Living healthy guide: www.[facility].[place].gov      Understanding Diabetes: www.diabetes.org      Eating healthy: www.cdc.gov/healthyweight      [facility] home safety checklist: www.cdc.gov/steadi/patient.html      Agency on Aging: www.goea.[place].gov      Alcoholics anonymous (AA): www.aa.org      Physical Activity: www.juan.nih.gov/bm6gsxc      Tobacco use: www.quitwithusla.org

## 2019-06-27 ENCOUNTER — OFFICE VISIT (OUTPATIENT)
Dept: INTERNAL MEDICINE | Facility: CLINIC | Age: 84
End: 2019-06-27
Payer: MEDICARE

## 2019-06-27 VITALS
DIASTOLIC BLOOD PRESSURE: 68 MMHG | TEMPERATURE: 97 F | HEART RATE: 80 BPM | BODY MASS INDEX: 27.12 KG/M2 | SYSTOLIC BLOOD PRESSURE: 124 MMHG | WEIGHT: 134.5 LBS | OXYGEN SATURATION: 95 % | HEIGHT: 59 IN

## 2019-06-27 DIAGNOSIS — Z91.89 AT RISK FOR DEPRESSED MOOD: Primary | ICD-10-CM

## 2019-06-27 DIAGNOSIS — Z72.820 POOR SLEEP: ICD-10-CM

## 2019-06-27 PROCEDURE — 99999 PR PBB SHADOW E&M-EST. PATIENT-LVL III: CPT | Mod: PBBFAC,HCNC,, | Performed by: FAMILY MEDICINE

## 2019-06-27 PROCEDURE — 3078F PR MOST RECENT DIASTOLIC BLOOD PRESSURE < 80 MM HG: ICD-10-PCS | Mod: HCNC,CPTII,S$GLB, | Performed by: FAMILY MEDICINE

## 2019-06-27 PROCEDURE — 1101F PR PT FALLS ASSESS DOC 0-1 FALLS W/OUT INJ PAST YR: ICD-10-PCS | Mod: HCNC,CPTII,S$GLB, | Performed by: FAMILY MEDICINE

## 2019-06-27 PROCEDURE — 3074F PR MOST RECENT SYSTOLIC BLOOD PRESSURE < 130 MM HG: ICD-10-PCS | Mod: HCNC,CPTII,S$GLB, | Performed by: FAMILY MEDICINE

## 2019-06-27 PROCEDURE — 99214 PR OFFICE/OUTPT VISIT, EST, LEVL IV, 30-39 MIN: ICD-10-PCS | Mod: HCNC,S$GLB,, | Performed by: FAMILY MEDICINE

## 2019-06-27 PROCEDURE — 99214 OFFICE O/P EST MOD 30 MIN: CPT | Mod: HCNC,S$GLB,, | Performed by: FAMILY MEDICINE

## 2019-06-27 PROCEDURE — 3074F SYST BP LT 130 MM HG: CPT | Mod: HCNC,CPTII,S$GLB, | Performed by: FAMILY MEDICINE

## 2019-06-27 PROCEDURE — 3078F DIAST BP <80 MM HG: CPT | Mod: HCNC,CPTII,S$GLB, | Performed by: FAMILY MEDICINE

## 2019-06-27 PROCEDURE — 99999 PR PBB SHADOW E&M-EST. PATIENT-LVL III: ICD-10-PCS | Mod: PBBFAC,HCNC,, | Performed by: FAMILY MEDICINE

## 2019-06-27 PROCEDURE — 1101F PT FALLS ASSESS-DOCD LE1/YR: CPT | Mod: HCNC,CPTII,S$GLB, | Performed by: FAMILY MEDICINE

## 2019-06-27 RX ORDER — TRAZODONE HYDROCHLORIDE 50 MG/1
25 TABLET ORAL NIGHTLY
Qty: 45 TABLET | Refills: 3 | Status: SHIPPED | OUTPATIENT
Start: 2019-06-27 | End: 2019-08-27

## 2019-06-27 NOTE — PROGRESS NOTES
Isamar Roe  06/27/2019  [id number]    Marina Garibay MD  Patient Care Team:  Marina Garibay MD as PCP - General (Family Medicine)  CASEY Giang OD as Consulting Physician (Optometry)  Reinaldo Noriega III, MD (Dermatology)  Leticia Maya MD as Consulting Physician (Endocrinology)  Oscar Hanson LPN as Care Coordinator (Internal Medicine)  Gunnar Almanzar MD as Consulting Physician (Pain Medicine)  Reinaldo Noriega III, MD (Dermatology)  Has the patient seen any provider outside of the [facility] network since the last visit? (no). If yes, HIPPA forms completed and records requested.        Visit Type:a scheduled routine follow-up visit    Chief Complaint:  No chief complaint on file.      History of Present Illness:  84 year old here for follow up on HRA>  She does feel sad, but not every day.  She reports that lack of sleep is a trigger.   She reports she cannot fall asleep. She does get up when she cannot sleep at times.  She was trying to read at times.  She did take a Gabapentin to help her relax.   She does see Dr. Almanzar. She reports she continues to  Have pain across the back, and had injections. She didn't feel any relief from the one from the hip.        History:  Past Medical History:   Diagnosis Date    Arthritis     hands    Benign hematuria     [place]leton    Calcific tendinitis of left shoulder 1/27/2016    Cataract     Diverticulosis     colonoscopy 5/22/2012    Duodenal diverticulum     EGD 5/22/2012    GERD (gastroesophageal reflux disease)     Hemorrhoids     colonoscopy 5/22/2012    History of skin cancer     per patient s/p excision, no chemo or radiation, sees outside derm, Dr. Tinsley.    Hyperlipidemia     Hypertension     Impingement syndrome of left shoulder 1/27/2016    Osteoporosis     declines tx;11/17/16 phone note    Pneumonia     as a child    Sciatica     [place]; dr castillo    Skin cancer     Dr. Noriega    Vitamin D deficiency disease      Past Surgical History:   Procedure  Laterality Date    APPENDECTOMY      CARPAL TUNNEL RELEASE Bilateral     CATARACT EXTRACTION W/  INTRAOCULAR LENS IMPLANT Left 05/01/2019    CATARACT EXTRACTION W/  INTRAOCULAR LENS IMPLANT Right 05/15/2019    CHOLECYSTECTOMY      FACETECTOMY Left 9/16/2014    [facility] 2ND F[facility]    HYSTERECTOMY      joint removal in right thumb      LAMINECTOMY-ANGELIA-SEMI Left 9/16/2014    [facility] 2ND [facility]LR    MICRODISKECTOMY-MINIMALLY INVASIVE Left 9/16/2014    [facility] OR 2ND [facility]LR    SKIN CANCER EXCISION      SPINE SURGERY  9/16/14    L4/5 laminectomy;dr lozada    TOE SURGERY      LT FIFTH     Family History   Problem Relation Age of Onset    Heart disease Mother     Hypertension Mother     Cataracts Mother     Heart disease Father     Hypertension Father     Cataracts Father     Heart disease Brother 45    Heart disease Sister         A-fib    Heart disease Sister         heart bypass x 5    Kidney disease Brother 70    Heart disease Brother     Cancer Maternal Aunt         breast    Diabetes Maternal Grandmother     Diabetes Maternal Aunt     Hypertension Other         multiple family members    Cancer Other         multiple with skin cancer    Stroke Neg Hx     Melanoma Neg Hx     Hyperlipidemia Neg Hx      Social History     Socioeconomic History    Marital status:      Spouse name: AMBREEN    Number of children: 4    Years of education: Not on file    Highest education level: Not on file   Occupational History    Not on file   Social Needs    Financial resource strain: Not on file    Food insecurity:     Worry: Not on file     Inability: Not on file    Transportation needs:     Medical: Not on file     Non-medical: Not on file   Tobacco Use    Smoking status: Never Smoker    Smokeless tobacco: Never Used   Substance and Sexual Activity    Alcohol use: No    Drug use: No    Sexual activity:  Yes     Partners: Male     Birth control/protection: See Surgical Hx   Lifestyle    Physical activity:     Days per week: Not on file     Minutes per session: Not on file    Stress: Not on file   Relationships    Social connections:     Talks on phone: Not on file     Gets together: Not on file     Attends Taoism service: Not on file     Active member of club or organization: Not on file     Attends meetings of clubs or organizations: Not on file     Relationship status: Not on file   Other Topics Concern    Are you pregnant or think you may be? No    Breast-feeding No   Social History Narrative    Wears a seatbelt.     Patient Active Problem List   Diagnosis    HTN (hypertension)    Hyperlipidemia    Hiatal hernia with gastroesophageal reflux    Lumbar arthropathy    Osteoporosis    Nuclear sclerosis of both eyes    Choroidal nevus of right eye    Arteriosclerosis of aorta    Heart valve regurgitation    History of skin cancer    Maxillary sinusitis    Statin intolerance    Pseudophakia    Sacroiliac joint dysfunction     Review of patient's allergies indicates:   Allergen Reactions    Ace inhibitors      Other reaction(s): cough    Amlodipine      Leg swelling    Chlorthalidone      Other reaction(s): ? dizzy  Other reaction(s): ? dizzy    Codeine Nausea And Vomiting    Demerol (pf)  [meperidine (pf)]      Other reaction(s): Itching    Fosamax  [alendronate]      Other reaction(s): aches    Fosamax plus d  [alendronate-vitamin d3]      Other reaction(s): aches    Hydrochlorothiazide (bulk)      Other reaction(s): bad taste    Hydrocodone-acetaminophen      Other reaction(s): Stomach upset  Other reaction(s): Nausea    Ibandronate      Other reaction(s): aches  Other reaction(s): aches    Ibandronate sodium      Other reaction(s): Unknown    Losartan Diarrhea    Meperidine Itching    Opioids - morphine analogues     Opium     Statins-hmg-coa reductase inhibitors      Few  statins intol    Toprol xl  [metoprolol succinate]      Other reaction(s): diarrhea  Other reaction(s): diarrhea    Tramadol        The following were reviewed at this visit: active problem list, medication list, allergies, family history, social history, and health maintenance.    Medications:  Current Outpatient Medications on File Prior to Visit   Medication Sig Dispense Refill    calcium-vitamin D 250-100 mg-unit per tablet Take 1 tablet by mouth.      gabapentin (NEURONTIN) 300 MG capsule Take 1 capsule (300 mg total) by mouth 2 (two) times daily. 60 capsule 1    meloxicam (MOBIC) 15 MG tablet Take 1 tablet (15 mg total) by mouth once daily. 30 tablet 5    tiZANidine (ZANAFLEX) 4 MG tablet Take 1 tablet (4 mg total) by mouth 2 (two) times daily as needed. 60 tablet 0    verapamil (CALAN) 120 MG tablet Take 1 tablet (120 mg total) by mouth 2 (two) times daily. 60 tablet 5    vitamin E 400 UNIT capsule Take 400 Units by mouth once daily.       No current facility-administered medications on file prior to visit.        Medications have been reviewed and reconciled with patient at this visit.  Barriers to medications present (no)    Adverse reactions to current medications (no)    Over the counter medications reviewed (Yes ), and if needed added to active Medication list at this visit.     Exam:  Wt Readings from Last 3 Encounters:   06/27/19 61 kg (134 lb 7.7 oz)   06/21/19 60.8 kg (134 lb)   06/21/19 61.1 kg (134 lb 11.2 oz)     Temp Readings from Last 3 Encounters:   06/27/19 96.5 °F (35.8 °C)   06/13/19 96 °F (35.6 °C) (Tympanic)   05/13/19 96.3 °F (35.7 °C) (Tympanic)     BP Readings from Last 3 Encounters:   06/27/19 124/68   06/21/19 132/71   06/21/19 122/72     Pulse Readings from Last 3 Encounters:   06/27/19 80   06/21/19 79   06/21/19 76     Body mass index is 27.16 kg/m².      Review of Systems   Constitutional: Negative.  Negative for chills and fever.   HENT: Negative.  Negative for  congestion, sinus pain and sore throat.    Eyes: Negative for blurred vision and double vision.   Respiratory: Negative for cough, sputum production, shortness of breath and wheezing.    Cardiovascular: Negative for chest pain, palpitations and leg swelling.   Gastrointestinal: Negative for abdominal pain, constipation, diarrhea, heartburn, nausea and vomiting.   Genitourinary: Negative.    Musculoskeletal: Positive for back pain.   Skin: Negative.  Negative for rash.   Neurological: Negative.    Endo/Heme/Allergies: Negative.  Negative for polydipsia. Does not bruise/bleed easily.   Psychiatric/Behavioral: Positive for depression. Negative for substance abuse. The patient has insomnia.      Physical Exam   Constitutional: She appears well-developed and well-nourished.   HENT:   Head: Normocephalic.   Eyes: Pupils are equal, round, and reactive to light. EOM are normal.   Neck: Normal range of motion. Neck supple.   Cardiovascular: Normal rate, regular rhythm and normal heart sounds.   Pulmonary/Chest: Effort normal and breath sounds normal. No respiratory distress.   Musculoskeletal: Normal range of motion. She exhibits tenderness.        Lumbar back: She exhibits tenderness.   Psychiatric: She has a normal mood and affect. Her behavior is normal. Judgment and thought content normal.   Nursing note and vitals reviewed.      Laboratory Reviewed ({Yes)  Lab Results   Component Value Date    WBC 5.56 04/18/2018    HGB 13.6 04/18/2018    HCT 43.3 04/18/2018     04/18/2018    CHOL 277 (H) 08/30/2018    TRIG 114 08/30/2018    HDL 71 08/30/2018    ALT 12 08/30/2018    AST 14 08/30/2018     08/30/2018    K 4.3 08/30/2018     08/30/2018    CREATININE 0.7 08/30/2018    BUN 19 08/30/2018    CO2 26 08/30/2018    TSH 1.292 07/10/2013    INR 1.0 02/02/2015       Diagnoses and all orders for this visit:    At risk for depressed mood  -     traZODone (DESYREL) 50 MG tablet; Take 0.5 tablets (25 mg total) by  mouth every evening.    Poor sleep  -     traZODone (DESYREL) 50 MG tablet; Take 0.5 tablets (25 mg total) by mouth every evening.      Follow up with [facility] tomorrow  Hopefully some tx of her LBP will help with her sleep and depressive sx.    Trial of Trazodone to help sleep.    Other HRA reviewed  Tdap at pharmacy if wanted.  Labs scheduled already for August for follow up.            Care Plan/Goals: Reviewed  (N/A)  Goals     None          Follow up: Follow up in about 6 weeks (around 8/8/2019).    After visit summary was printed and given to patient upon discharge today.  Patient goals and care plan are included in After Visit Summary.

## 2019-06-28 ENCOUNTER — OFFICE VISIT (OUTPATIENT)
Dept: NEUROSURGERY | Facility: CLINIC | Age: 84
End: 2019-06-28
Payer: MEDICARE

## 2019-06-28 ENCOUNTER — OFFICE VISIT (OUTPATIENT)
Dept: OPHTHALMOLOGY | Facility: CLINIC | Age: 84
End: 2019-06-28
Payer: MEDICARE

## 2019-06-28 VITALS
BODY MASS INDEX: 27.18 KG/M2 | HEART RATE: 75 BPM | HEIGHT: 59 IN | SYSTOLIC BLOOD PRESSURE: 124 MMHG | WEIGHT: 134.81 LBS | DIASTOLIC BLOOD PRESSURE: 69 MMHG

## 2019-06-28 DIAGNOSIS — Z98.890 POST-OPERATIVE STATE: Primary | ICD-10-CM

## 2019-06-28 DIAGNOSIS — Z96.1 PSEUDOPHAKIA OF BOTH EYES: ICD-10-CM

## 2019-06-28 DIAGNOSIS — M54.9 BACK PAIN, UNSPECIFIED BACK LOCATION, UNSPECIFIED BACK PAIN LATERALITY, UNSPECIFIED CHRONICITY: Primary | ICD-10-CM

## 2019-06-28 DIAGNOSIS — M43.16 SPONDYLOLISTHESIS, LUMBAR REGION: ICD-10-CM

## 2019-06-28 DIAGNOSIS — M54.16 LUMBAR RADICULOPATHY: ICD-10-CM

## 2019-06-28 DIAGNOSIS — M48.062 LUMBAR STENOSIS WITH NEUROGENIC CLAUDICATION: ICD-10-CM

## 2019-06-28 DIAGNOSIS — M47.816 LUMBAR ARTHROPATHY: ICD-10-CM

## 2019-06-28 PROCEDURE — 99204 PR OFFICE/OUTPT VISIT, NEW, LEVL IV, 45-59 MIN: ICD-10-PCS | Mod: HCNC,S$GLB,, | Performed by: NEUROLOGICAL SURGERY

## 2019-06-28 PROCEDURE — 3078F PR MOST RECENT DIASTOLIC BLOOD PRESSURE < 80 MM HG: ICD-10-PCS | Mod: HCNC,CPTII,S$GLB, | Performed by: NEUROLOGICAL SURGERY

## 2019-06-28 PROCEDURE — 3074F SYST BP LT 130 MM HG: CPT | Mod: HCNC,CPTII,S$GLB, | Performed by: NEUROLOGICAL SURGERY

## 2019-06-28 PROCEDURE — 99999 PR PBB SHADOW E&M-EST. PATIENT-LVL III: CPT | Mod: PBBFAC,HCNC,, | Performed by: NEUROLOGICAL SURGERY

## 2019-06-28 PROCEDURE — 3078F DIAST BP <80 MM HG: CPT | Mod: HCNC,CPTII,S$GLB, | Performed by: NEUROLOGICAL SURGERY

## 2019-06-28 PROCEDURE — 99999 PR PBB SHADOW E&M-EST. PATIENT-LVL III: ICD-10-PCS | Mod: PBBFAC,HCNC,, | Performed by: NEUROLOGICAL SURGERY

## 2019-06-28 PROCEDURE — 99999 PR PBB SHADOW E&M-EST. PATIENT-LVL II: CPT | Mod: PBBFAC,HCNC,, | Performed by: OPTOMETRIST

## 2019-06-28 PROCEDURE — 99024 PR POST-OP FOLLOW-UP VISIT: ICD-10-PCS | Mod: HCNC,S$GLB,, | Performed by: OPTOMETRIST

## 2019-06-28 PROCEDURE — 1101F PR PT FALLS ASSESS DOC 0-1 FALLS W/OUT INJ PAST YR: ICD-10-PCS | Mod: HCNC,CPTII,S$GLB, | Performed by: NEUROLOGICAL SURGERY

## 2019-06-28 PROCEDURE — 99204 OFFICE O/P NEW MOD 45 MIN: CPT | Mod: HCNC,S$GLB,, | Performed by: NEUROLOGICAL SURGERY

## 2019-06-28 PROCEDURE — 99999 PR PBB SHADOW E&M-EST. PATIENT-LVL II: ICD-10-PCS | Mod: PBBFAC,HCNC,, | Performed by: OPTOMETRIST

## 2019-06-28 PROCEDURE — 3074F PR MOST RECENT SYSTOLIC BLOOD PRESSURE < 130 MM HG: ICD-10-PCS | Mod: HCNC,CPTII,S$GLB, | Performed by: NEUROLOGICAL SURGERY

## 2019-06-28 PROCEDURE — 99024 POSTOP FOLLOW-UP VISIT: CPT | Mod: HCNC,S$GLB,, | Performed by: OPTOMETRIST

## 2019-06-28 PROCEDURE — 1101F PT FALLS ASSESS-DOCD LE1/YR: CPT | Mod: HCNC,CPTII,S$GLB, | Performed by: NEUROLOGICAL SURGERY

## 2019-06-28 NOTE — LETTER
July 7, 2019      Gunnar Almanzar MD  [id number] [facility]  [place] [address]           [address] [place] [address]  Phone: [phone]  Fax: [phone]          Patient: Isamar Roe   MR Number: [id number]   YOB: 1935   Date of Visit: 6/28/2019       Dear Dr. Gunnar Almanzar:    Thank you for referring Isamar Roe to me for evaluation. Attached you will find relevant portions of my assessment and plan of care.    If you have questions, please do not hesitate to call me. I look forward to following Isamar Roe along with you.    Sincerely,    Zach Cooper MD    Enclosure  CC:  No Recipients    If you would like to receive this communication electronically, please contact [email or web address] or [phone] to request more information on [employer] access.    For providers and/or their staff who would like to refer a patient to [facility], please contact us through our one-stop-shop provider referral line, [facility] [place], at [phone].    If you feel you have received this communication in error or would no longer like to receive these types of communications, please e-mail [email or web address]

## 2019-06-28 NOTE — PROGRESS NOTES
HPI     Last CPG exam 05/21/2019  5 week post op  PCIOL OD 05/15/2019  PCIOL OS 05/01/2019  Mono implants OD distance  OS near   Choroidal nevus OD  Dermato OU      Last edited by Sonido Garibay MA on 6/28/2019  9:30 AM. (History)            Assessment /Plan     For exam results, see Encounter Report.    Post-operative state    Pseudophakia of both eyes      Stable PIOL OU at 5 week PO.  RTC 6 months.  No specs = monovision.

## 2019-07-08 ENCOUNTER — TELEPHONE (OUTPATIENT)
Dept: NEUROSURGERY | Facility: CLINIC | Age: 84
End: 2019-07-08

## 2019-07-08 NOTE — TELEPHONE ENCOUNTER
Spoke with patient she verbalized understanding authorization is pending they are actively working on case.     ----- Message from Josefina Combs sent at 7/8/2019  4:25 PM CDT -----  Contact: patient  Calling concerning the status of PT appointment. Please call patient ASAP @ [phone]. Thanks, jaleel

## 2019-07-08 NOTE — PROGRESS NOTES
Subjective:      Patient ID: Isamar Roe is a 84 y.o. female.    Chief Complaint: Lumbar Spine Pain (L-Spine) (x 4-5 mths) and Extremity Weakness (Right Leg x 4-5 mths)    Patient is here today as a new referral for evaluation of lower back pain she has an MRI for my review however it is from 2014    This is been a chronic problem for her.  She has had a previous history of having a lumbar laminectomy with removal of synovial cyst back in 2014.  She had been doing well however over the past 4 months she has been having lower back pain and aching going down the right lower extremity.  The pain is worse with activity and better with rest.  Currently the pain is 6/10 in severity going down the right lower extremity to the ankle and lateral aspect of her right lower extremity.  She has been seen pain management for this and recently had an SI joint injection which helped for several days.  She has had physical therapy in the past however none recently.  She has an MRI report done in March of 2019 for my review.  She will occasionally ambulate with a cane.  No other complaints at this time      Review of Systems   Constitutional: Negative for activity change, appetite change and chills.   HENT: Negative for hearing loss, sore throat and tinnitus.    Eyes: Negative for pain, discharge and itching.   Cardiovascular: Negative for chest pain.   Gastrointestinal: Negative for abdominal pain.   Endocrine: Negative for cold intolerance and heat intolerance.   Genitourinary: Negative for difficulty urinating and dysuria.   Musculoskeletal: Positive for back pain and gait problem.   Allergic/Immunologic: Negative for environmental allergies.   Neurological: Positive for weakness. Negative for dizziness, tremors, light-headedness and headaches.   Hematological: Negative for adenopathy.   Psychiatric/Behavioral: Negative for agitation, behavioral problems and confusion.         Objective:       Neurosurgery Physical  Exam  Ortho/SPM Exam    Nursing note and vitals reviewed  Gen:Oriented to person, place, and time.             Appears stated age   Skin: no rashes or lesions identified   Head:Normocephalic and atraumatic.  Nose: Nose normal.    Eyes: EOM are normal. Pupils are equal, round, and reactive to light.   Neck: Neck supple. No masses or lesions palpated  Cardiovascular: Intact distal pulses.    Abdominal: Soft.   Neurological: Alert and oriented to person, place, and time.  No cranial nerve deficit.  Coordination normal. Normal muscle tone  Psychiatric: Normal mood and affect. Behavior is normal.      Back:       None  Paraspinal muscle spasms    Pain with extension Pain with flexion and extention    Limited Range of motion    Neg  Straight leg raise     Motor   Right Right Left Left  Level Group   5  5  L2 Hip flexor (Psoas)   5  5  L3 Leg extension (Quads)    4 5  L4 Dorsiflexion & foot inversion (Tibialis Anterior)    4 5  L5 Great toe extension ( EHL)   5  5  S1 Foot eversion (Gastroc, PL & PB)     Sensation  NL Decreased (R/L/BL) Level Sensation    X  L2 Anterio-medial thigh   X  L3 Medial thigh around knee    Diminished to light touch on the right L4 Medial foot    Diminished to light touch on the right L5 Dorsum foot   X  S1 Lateral foot     Reflex  2+  Patellar tendon (L4)   2+  Achilles tendon (S1)         I  reviewed all pertinent imaging regarding this case.  MRI from 2014 shows synovial cyst at L4-5 with grade 1 spondylolisthesis at this level.  Assessment:     1. Back pain, unspecified back location, unspecified back pain laterality, unspecified chronicity    2. Lumbar arthropathy    3. Lumbar radiculopathy    4. Lumbar stenosis with neurogenic claudication    5. Spondylolisthesis, lumbar region      Plan:     Back pain, unspecified back location, unspecified back pain laterality, unspecified chronicity  -     Ambulatory Referral to Physical/Occupational Therapy    Lumbar arthropathy  -     Ambulatory  Referral to Physical/Occupational Therapy    Lumbar radiculopathy    Lumbar stenosis with neurogenic claudication    Spondylolisthesis, lumbar region     Because the patient does not have any recent imaging, and she had a known spondylolisthesis at L4-5 in the past I would like to order a new MRI of the lumbar spine for review.  I would also like to send her to physical therapy in the meantime to see how she would do with some core and range of motion exercises.  Will follow her back up after the therapy and see if there is any other treatments warranted    Thank you for the referral   Please call with any questions    Zach Cooper MD  Neurosurgery

## 2019-07-11 DIAGNOSIS — M43.16 SPONDYLOLISTHESIS, LUMBAR REGION: Primary | ICD-10-CM

## 2019-07-11 RX ORDER — TIZANIDINE 4 MG/1
4 TABLET ORAL 2 TIMES DAILY PRN
Qty: 60 TABLET | Refills: 0 | OUTPATIENT
Start: 2019-07-11 | End: 2019-07-16 | Stop reason: SDUPTHER

## 2019-07-11 RX ORDER — TIZANIDINE 4 MG/1
4 TABLET ORAL 2 TIMES DAILY PRN
Qty: 60 TABLET | Refills: 0 | OUTPATIENT
Start: 2019-07-11 | End: 2019-08-10

## 2019-07-11 NOTE — TELEPHONE ENCOUNTER
Spoke with pt informed her the request for her medication has been sent to the Provider we will work to have medication script in for today, pt is also informed her last script ends on 7/13 not sure if the Pharm will refill early, pt stated understanding//ns     ----- Message from Floresita Moses sent at 7/11/2019  9:33 AM CDT -----  .Type:  RX Refill Request    Who Called: self  Refill or New Rx:refill  RX Name and Strength:muscle relaxer (doesn't know name)  How is the patient currently taking it? (ex. 1XDay): 2/day  Is this a 30 day or 90 day RX:..30  Preferred Pharmacy with phone number.  [facility] [address] [place] [address]  [address]  [address] [place] [id number]  Phone: [phone] Fax: [phone]    Local or Mail Order:local  Ordering Provider: brennon  Would the patient rather a call back or a response via [facility]? call  Best Call Back Number:. [phone]      Additional Information:NEEDS ASAP, HAS TO EVACUATE

## 2019-07-15 ENCOUNTER — TELEPHONE (OUTPATIENT)
Dept: NEUROSURGERY | Facility: CLINIC | Age: 84
End: 2019-07-15

## 2019-07-15 NOTE — TELEPHONE ENCOUNTER
Spoke w/ patient advise script for tiZANidine (ZANAFLEX) 4 MG tablet  was submitted to local pharmacy as requested on 7/11/19 at 1:11pm.    Patient verbalized understanding stated she will reach-out to her pharmacy to discuss further.    ----- Message from Dolly Reilly sent at 7/15/2019  3:59 PM CDT -----  Contact: self  Type:  Sooner Apoointment Request    Caller is requesting a sooner appointment.  Caller declined first available appointment listed below.  Caller will not accept being placed on the waitlist and is requesting a message be sent to doctor.  Name of Caller:Isamar Roe  When is the first available appointment?07/16  Symptoms: n/a  Would the patient rather a call back or a response via [employer]? Call back  Best Call Back Number:[phone]  Additional Information: pt wants to be seen tomorrow but wants an earlier appt time. Pt also has one pill left and called Thursday but has not gotten response yet.    Thanks,  Dolly Reilly

## 2019-07-16 ENCOUNTER — OFFICE VISIT (OUTPATIENT)
Dept: NEUROSURGERY | Facility: CLINIC | Age: 84
End: 2019-07-16
Payer: MEDICARE

## 2019-07-16 VITALS
HEART RATE: 83 BPM | BODY MASS INDEX: 27.2 KG/M2 | SYSTOLIC BLOOD PRESSURE: 156 MMHG | HEIGHT: 59 IN | WEIGHT: 134.94 LBS | DIASTOLIC BLOOD PRESSURE: 70 MMHG | RESPIRATION RATE: 16 BRPM

## 2019-07-16 DIAGNOSIS — M43.16 SPONDYLOLISTHESIS, LUMBAR REGION: ICD-10-CM

## 2019-07-16 DIAGNOSIS — M47.816 LUMBAR ARTHROPATHY: Primary | ICD-10-CM

## 2019-07-16 DIAGNOSIS — M48.062 LUMBAR STENOSIS WITH NEUROGENIC CLAUDICATION: ICD-10-CM

## 2019-07-16 DIAGNOSIS — M51.36 DEGENERATIVE DISC DISEASE, LUMBAR: ICD-10-CM

## 2019-07-16 PROCEDURE — 99213 PR OFFICE/OUTPT VISIT, EST, LEVL III, 20-29 MIN: ICD-10-PCS | Mod: HCNC,S$GLB,, | Performed by: NEUROLOGICAL SURGERY

## 2019-07-16 PROCEDURE — 3078F PR MOST RECENT DIASTOLIC BLOOD PRESSURE < 80 MM HG: ICD-10-PCS | Mod: HCNC,CPTII,S$GLB, | Performed by: NEUROLOGICAL SURGERY

## 2019-07-16 PROCEDURE — 3077F SYST BP >= 140 MM HG: CPT | Mod: HCNC,CPTII,S$GLB, | Performed by: NEUROLOGICAL SURGERY

## 2019-07-16 PROCEDURE — 1101F PR PT FALLS ASSESS DOC 0-1 FALLS W/OUT INJ PAST YR: ICD-10-PCS | Mod: HCNC,CPTII,S$GLB, | Performed by: NEUROLOGICAL SURGERY

## 2019-07-16 PROCEDURE — 3077F PR MOST RECENT SYSTOLIC BLOOD PRESSURE >= 140 MM HG: ICD-10-PCS | Mod: HCNC,CPTII,S$GLB, | Performed by: NEUROLOGICAL SURGERY

## 2019-07-16 PROCEDURE — 3078F DIAST BP <80 MM HG: CPT | Mod: HCNC,CPTII,S$GLB, | Performed by: NEUROLOGICAL SURGERY

## 2019-07-16 PROCEDURE — 99999 PR PBB SHADOW E&M-EST. PATIENT-LVL III: CPT | Mod: PBBFAC,HCNC,, | Performed by: NEUROLOGICAL SURGERY

## 2019-07-16 PROCEDURE — 99999 PR PBB SHADOW E&M-EST. PATIENT-LVL III: ICD-10-PCS | Mod: PBBFAC,HCNC,, | Performed by: NEUROLOGICAL SURGERY

## 2019-07-16 PROCEDURE — 99213 OFFICE O/P EST LOW 20 MIN: CPT | Mod: HCNC,S$GLB,, | Performed by: NEUROLOGICAL SURGERY

## 2019-07-16 PROCEDURE — 1101F PT FALLS ASSESS-DOCD LE1/YR: CPT | Mod: HCNC,CPTII,S$GLB, | Performed by: NEUROLOGICAL SURGERY

## 2019-07-16 RX ORDER — TIZANIDINE 4 MG/1
4 TABLET ORAL EVERY 6 HOURS PRN
Qty: 60 TABLET | Refills: 0 | Status: SHIPPED | OUTPATIENT
Start: 2019-07-16 | End: 2019-08-27

## 2019-07-16 NOTE — PROGRESS NOTES
Subjective:      Patient ID: Isamar Roe is a 84 y.o. female.    Chief Complaint: Lumbar Spine Pain (L-Spine) (Lumbar radiculopathy)    HPI   Patient is here today for evaluation with a new MRI of the lumbar spine for my review.  Her pain essentially is unchanged.  She complains of pain 10/10 in the back radiating down the right lower extremity to the knee.  No numbness and tingling.  She does have some subjective weakness secondary to pain in her lower extremities    Review of Systems   Constitutional: Negative for activity change, appetite change and chills.   HENT: Negative for hearing loss, sore throat and tinnitus.    Eyes: Negative for pain, discharge and itching.   Cardiovascular: Negative for chest pain.   Gastrointestinal: Negative for abdominal pain.   Endocrine: Negative for cold intolerance and heat intolerance.   Genitourinary: Negative for difficulty urinating and dysuria.   Musculoskeletal: Positive for back pain and gait problem.   Allergic/Immunologic: Negative for environmental allergies.   Neurological: Positive for weakness. Negative for dizziness, tremors, light-headedness and headaches.   Hematological: Negative for adenopathy.   Psychiatric/Behavioral: Negative for agitation, behavioral problems and confusion.         Objective:       Neurosurgery Physical Exam  Ortho/SPM Exam    Nursing note and vitals reviewed  Gen:Oriented to person, place, and time.             Appears stated age   Skin: no rashes or lesions identified   Head:Normocephalic and atraumatic.  Nose: Nose normal.    Eyes: EOM are normal. Pupils are equal, round, and reactive to light.   Neck: Neck supple. No masses or lesions palpated  Cardiovascular: Intact distal pulses.    Abdominal: Soft.   Neurological: Alert and oriented to person, place, and time.  No cranial nerve deficit.  Coordination normal. Normal muscle tone  Psychiatric: Normal mood and affect. Behavior is normal.      Back:       None  Paraspinal muscle spasms     Pain with extension Pain with flexion and extention    Limited Range of motion    Neg  Straight leg raise     Motor   Right Right Left Left  Level Group   5  5  L2 Hip flexor (Psoas)   5  5  L3 Leg extension (Quads)    4 5  L4 Dorsiflexion & foot inversion (Tibialis Anterior)    4 5  L5 Great toe extension ( EHL)   5  5  S1 Foot eversion (Gastroc, PL & PB)     Sensation  NL Decreased (R/L/BL) Level Sensation    X  L2 Anterio-medial thigh   X  L3 Medial thigh around knee    Diminished to light touch on the right L4 Medial foot    Diminished to light touch on the right L5 Dorsum foot   X  S1 Lateral foot     Reflex  2+  Patellar tendon (L4)   2+  Achilles tendon (S1)     MRI of the lumbar spine  Shows spondylolisthesis at L3-4 and L4-5 grade 1.  There is minimal central stenosis and mild foraminal stenosis bilaterally at each of these levels.  At the L5-S1 level there are bilateral perineural cysts however the foramen appear open and there is no central stenosis at this level    Assessment:     1. Lumbar arthropathy    2. Degenerative disc disease, lumbar    3. Lumbar stenosis with neurogenic claudication    4. Spondylolisthesis, lumbar region      Plan:     Lumbar arthropathy  -     tiZANidine (ZANAFLEX) 4 MG tablet; Take 1 tablet (4 mg total) by mouth every 6 (six) hours as needed (muscle spasms).  Dispense: 60 tablet; Refill: 0  -     X-Ray Lumbar Spine Flexion And Extension Only; Future; Expected date: 07/16/2019  -     CT Lumbar Spine Without Contrast; Future; Expected date: 07/16/2019    Degenerative disc disease, lumbar    Lumbar stenosis with neurogenic claudication    Spondylolisthesis, lumbar region         I have reviewed the patient's MRI she has says bilateral cyst at L5-S1 which is of unknown significance.  She has a spondylolisthesis at L3-4 and 4-5 which appears stable since 2014.  Unsure of the source of her new symptoms.  We will order an x-ray and CT scan of the lumbar spine for review.  She can  follow up after the imaging is complete.      Thank you for the referral   Please call with any questions     Zach Cooper MD  Neurosurgery

## 2019-07-22 ENCOUNTER — HOSPITAL ENCOUNTER (OUTPATIENT)
Dept: RADIOLOGY | Facility: HOSPITAL | Age: 84
Discharge: HOME | End: 2019-07-22
Attending: NEUROLOGICAL SURGERY
Payer: MEDICARE

## 2019-07-22 DIAGNOSIS — M47.816 LUMBAR ARTHROPATHY: ICD-10-CM

## 2019-07-22 PROCEDURE — 72131 CT LUMBAR SPINE W/O DYE: CPT | Mod: TC,HCNC

## 2019-07-22 PROCEDURE — 72120 X-RAY BEND ONLY L-S SPINE: CPT | Mod: TC,HCNC

## 2019-07-25 ENCOUNTER — TELEPHONE (OUTPATIENT)
Dept: NEUROSURGERY | Facility: CLINIC | Age: 84
End: 2019-07-25

## 2019-07-25 ENCOUNTER — OFFICE VISIT (OUTPATIENT)
Dept: OPHTHALMOLOGY | Facility: CLINIC | Age: 84
End: 2019-07-25
Payer: MEDICARE

## 2019-07-25 DIAGNOSIS — H43.392 VITREOUS FLOATER, LEFT: Primary | ICD-10-CM

## 2019-07-25 DIAGNOSIS — D31.31 CHOROIDAL NEVUS OF RIGHT EYE: ICD-10-CM

## 2019-07-25 DIAGNOSIS — Z96.1 PSEUDOPHAKIA OF BOTH EYES: ICD-10-CM

## 2019-07-25 PROCEDURE — 92014 PR EYE EXAM, EST PATIENT,COMPREHESV: ICD-10-PCS | Mod: HCNC,S$GLB,, | Performed by: OPHTHALMOLOGY

## 2019-07-25 PROCEDURE — 99999 PR PBB SHADOW E&M-EST. PATIENT-LVL II: CPT | Mod: PBBFAC,HCNC,, | Performed by: OPHTHALMOLOGY

## 2019-07-25 PROCEDURE — 92250 COLOR FUNDUS PHOTOGRAPHY - OU - BOTH EYES: ICD-10-PCS | Mod: HCNC,S$GLB,, | Performed by: OPHTHALMOLOGY

## 2019-07-25 PROCEDURE — 92014 COMPRE OPH EXAM EST PT 1/>: CPT | Mod: HCNC,S$GLB,, | Performed by: OPHTHALMOLOGY

## 2019-07-25 PROCEDURE — 92250 FUNDUS PHOTOGRAPHY W/I&R: CPT | Mod: HCNC,S$GLB,, | Performed by: OPHTHALMOLOGY

## 2019-07-25 PROCEDURE — 99999 PR PBB SHADOW E&M-EST. PATIENT-LVL II: ICD-10-PCS | Mod: PBBFAC,HCNC,, | Performed by: OPHTHALMOLOGY

## 2019-07-25 NOTE — PROGRESS NOTES
SUBJECTIVE:   Isamar Roe is a 84 y.o. female   Uncorrected distance visual acuity was 20/25 in the right eye and 20/80 +1 in the left eye. Uncorrected near visual acuity was not recorded in the right eye and J1 in the left eye.   Chief Complaint   Patient presents with    Spots and/or Floaters     pt c/o spider web over left eye since yesterday. no flashing lights         HPI:  HPI     Spots and/or Floaters      Additional comments: pt c/o spider web over left eye since yesterday. no   flashing lights               Comments     1. PCIOL OD +21.0 SN60WF 5-15-19  PCIOL OS +22.5 SN60WF (-1.75--2.00) 5-1-19  2. Choroidal nevus od  3. Dermato ou  SCL wear monovision 45 years (OD distance)          Last edited by Heather Roper MA on 7/25/2019  9:53 AM. (History)        Assessment /Plan :  1. Vitreous floater, left discussed precautions, patient to call if she notices any new changes   2. Pseudophakia of both eyes stable   3. Choroidal nevus of right eye stable     RTC in 4 to 5 weeks with Dr. Coronel to evaluate the retina

## 2019-08-08 ENCOUNTER — TELEPHONE (OUTPATIENT)
Dept: NEUROSURGERY | Facility: CLINIC | Age: 84
End: 2019-08-08

## 2019-08-08 DIAGNOSIS — M51.36 DEGENERATIVE DISC DISEASE, LUMBAR: Primary | ICD-10-CM

## 2019-08-08 DIAGNOSIS — M48.062 LUMBAR STENOSIS WITH NEUROGENIC CLAUDICATION: ICD-10-CM

## 2019-08-08 NOTE — TELEPHONE ENCOUNTER
Phoned pt in regards to message below.    States she found one in the [place] area, and that it's easier for her to get there rather than driving to [place]; considering she lives in [place].     [facility]   [address] [facility] [address] [place] [address]  [phone]      ----- Message from Buffy Garibay sent at 8/8/2019 10:10 AM CDT -----  Contact: pt   Call pt in regards to a physical therapy that she want to go to.    [phone]

## 2019-08-12 ENCOUNTER — PATIENT MESSAGE (OUTPATIENT)
Dept: NEUROSURGERY | Facility: CLINIC | Age: 84
End: 2019-08-12

## 2019-08-27 ENCOUNTER — OFFICE VISIT (OUTPATIENT)
Dept: OPHTHALMOLOGY | Facility: CLINIC | Age: 84
End: 2019-08-27
Payer: MEDICARE

## 2019-08-27 DIAGNOSIS — D31.31 CHOROIDAL NEVUS OF RIGHT EYE: Primary | ICD-10-CM

## 2019-08-27 DIAGNOSIS — H43.812 POSTERIOR VITREOUS DETACHMENT OF LEFT EYE: ICD-10-CM

## 2019-08-27 PROCEDURE — 99999 PR PBB SHADOW E&M-EST. PATIENT-LVL II: ICD-10-PCS | Mod: PBBFAC,HCNC,, | Performed by: OPHTHALMOLOGY

## 2019-08-27 PROCEDURE — 92014 PR EYE EXAM, EST PATIENT,COMPREHESV: ICD-10-PCS | Mod: HCNC,S$GLB,, | Performed by: OPHTHALMOLOGY

## 2019-08-27 PROCEDURE — 92014 COMPRE OPH EXAM EST PT 1/>: CPT | Mod: HCNC,S$GLB,, | Performed by: OPHTHALMOLOGY

## 2019-08-27 PROCEDURE — 99999 PR PBB SHADOW E&M-EST. PATIENT-LVL II: CPT | Mod: PBBFAC,HCNC,, | Performed by: OPHTHALMOLOGY

## 2019-08-27 NOTE — PROGRESS NOTES
===============================  Isamar Roe,   84 y.o. female   Last visit [facility]: :Visit date not found   Last visit [facility] dept. 7/25/2019  [facility]:  Uncorrected distance visual acuity was 20/30 in the right eye and not recorded in the left eye. Uncorrected near visual acuity was not recorded in the right eye and J2 in the left eye.  Tonometry     Tonometry (Applanation, 9:40 AM)       Right Left    Pressure 18 12               Not recorded         Not recorded         Not recorded        Chief Complaint   Patient presents with    Spots and/or Floaters     retinal eval per dr. mays          ________________  8/27/2019  HPI     Spots and/or Floaters      Additional comments: retinal eval per dr. cpg              Comments     1. PCIOL OD +21.0 SN60WF 5-15-19  PCIOL OS +22.5 SN60WF (-1.75--2.00) 5-1-19  2. Choroidal nevus od  3. Dermato ou  SCL wear monovision 45 years (OD distance)          Last edited by Brandy Wilcox on 8/27/2019  9:30 AM. 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 No tears or breaks were seen after careful retinal evaluation. Discussed retinal detachment signs and symptoms including flashes of lights, floaters, perceived curtains or veils. Advised to patient to monitor visual status including increase in flashes and floaters, or the development of visual field changes including curtain and /or veils. Advised patient to RTC urgently if these symptoms occur.           .rtc 1 year with Dr. Giang       ===========================

## 2019-08-30 ENCOUNTER — LAB VISIT (OUTPATIENT)
Dept: LAB | Facility: HOSPITAL | Age: 84
End: 2019-08-30
Payer: MEDICARE

## 2019-08-30 DIAGNOSIS — I10 ESSENTIAL HYPERTENSION: Chronic | ICD-10-CM

## 2019-08-30 LAB
ANION GAP SERPL CALC-SCNC: 6 MMOL/L (ref 8–16)
BUN SERPL-MCNC: 19 MG/DL (ref 8–23)
CALCIUM SERPL-MCNC: 10.3 MG/DL (ref 8.7–10.5)
CHLORIDE SERPL-SCNC: 107 MMOL/L (ref 95–110)
CHOLEST SERPL-MCNC: 275 MG/DL (ref 120–199)
CHOLEST/HDLC SERPL: 4 {RATIO} (ref 2–5)
CO2 SERPL-SCNC: 29 MMOL/L (ref 23–29)
CREAT SERPL-MCNC: 0.8 MG/DL (ref 0.5–1.4)
EST. GFR  (AFRICAN AMERICAN): >60 ML/MIN/1.73 M^2
EST. GFR  (NON AFRICAN AMERICAN): >60 ML/MIN/1.73 M^2
GLUCOSE SERPL-MCNC: 86 MG/DL (ref 70–110)
HDLC SERPL-MCNC: 68 MG/DL (ref 40–75)
HDLC SERPL: 24.7 % (ref 20–50)
LDLC SERPL CALC-MCNC: 168.2 MG/DL (ref 63–159)
NONHDLC SERPL-MCNC: 207 MG/DL
POTASSIUM SERPL-SCNC: 4.9 MMOL/L (ref 3.5–5.1)
SODIUM SERPL-SCNC: 142 MMOL/L (ref 136–145)
TRIGL SERPL-MCNC: 194 MG/DL (ref 30–150)

## 2019-08-30 PROCEDURE — 36415 COLL VENOUS BLD VENIPUNCTURE: CPT | Mod: HCNC

## 2019-08-30 PROCEDURE — 80061 LIPID PANEL: CPT | Mod: HCNC

## 2019-08-30 PROCEDURE — 80048 BASIC METABOLIC PNL TOTAL CA: CPT | Mod: HCNC

## 2019-09-03 ENCOUNTER — TELEPHONE (OUTPATIENT)
Dept: NEUROSURGERY | Facility: CLINIC | Age: 84
End: 2019-09-03

## 2019-09-03 NOTE — TELEPHONE ENCOUNTER
Spoke with pt in reference to the message below informed pt she would have to sign a release with medical records or notes at the [facility] location and sign a release here at the [facility] for a copy of the disc, pt stated understanding d/t she's wanting this information on tomorrow//ns    ----- Message from Radha Jeff sent at 9/3/2019  4:28 PM CDT -----  Contact: Patient  Patient would like a copy of her MRI and Dr. Cooper notes,she would like to get a second opinion. Please call at Ph [phone].

## 2019-09-08 PROBLEM — J32.0 MAXILLARY SINUSITIS: Status: RESOLVED | Noted: 2017-11-13 | Resolved: 2019-09-08

## 2019-09-08 NOTE — ASSESSMENT & PLAN NOTE
DId not tolerate statin.  Found on CT in 2012.    Risk reductions, diet control.  Lab Results   Component Value Date    LDLCALC 168.2 (H) 08/30/2019     Can consider Zetia, but will need to review insurance coverage/cost  On ASA

## 2019-09-09 ENCOUNTER — TELEPHONE (OUTPATIENT)
Dept: NEUROSURGERY | Facility: CLINIC | Age: 84
End: 2019-09-09

## 2019-09-09 ENCOUNTER — OFFICE VISIT (OUTPATIENT)
Dept: INTERNAL MEDICINE | Facility: CLINIC | Age: 84
End: 2019-09-09
Payer: MEDICARE

## 2019-09-09 VITALS
SYSTOLIC BLOOD PRESSURE: 138 MMHG | TEMPERATURE: 97 F | BODY MASS INDEX: 27.38 KG/M2 | WEIGHT: 135.56 LBS | DIASTOLIC BLOOD PRESSURE: 52 MMHG | OXYGEN SATURATION: 95 % | HEART RATE: 85 BPM

## 2019-09-09 DIAGNOSIS — I70.0 ARTERIOSCLEROSIS OF AORTA: ICD-10-CM

## 2019-09-09 DIAGNOSIS — I10 ESSENTIAL HYPERTENSION: Chronic | ICD-10-CM

## 2019-09-09 DIAGNOSIS — Z72.820 POOR SLEEP: ICD-10-CM

## 2019-09-09 DIAGNOSIS — K44.9 HIATAL HERNIA WITH GASTROESOPHAGEAL REFLUX: ICD-10-CM

## 2019-09-09 DIAGNOSIS — E78.5 HYPERLIPIDEMIA, UNSPECIFIED HYPERLIPIDEMIA TYPE: Chronic | ICD-10-CM

## 2019-09-09 DIAGNOSIS — R21 RASH AND NONSPECIFIC SKIN ERUPTION: ICD-10-CM

## 2019-09-09 DIAGNOSIS — M81.0 OSTEOPOROSIS, UNSPECIFIED OSTEOPOROSIS TYPE, UNSPECIFIED PATHOLOGICAL FRACTURE PRESENCE: Chronic | ICD-10-CM

## 2019-09-09 DIAGNOSIS — Z85.828 HISTORY OF SKIN CANCER: ICD-10-CM

## 2019-09-09 DIAGNOSIS — M65.311 TRIGGER FINGER OF RIGHT THUMB: ICD-10-CM

## 2019-09-09 DIAGNOSIS — Z91.89 AT RISK FOR DEPRESSED MOOD: ICD-10-CM

## 2019-09-09 DIAGNOSIS — M47.816 LUMBAR ARTHROPATHY: ICD-10-CM

## 2019-09-09 DIAGNOSIS — I38 HEART VALVE REGURGITATION: ICD-10-CM

## 2019-09-09 DIAGNOSIS — Z78.9 STATIN INTOLERANCE: ICD-10-CM

## 2019-09-09 DIAGNOSIS — Z00.00 ANNUAL PHYSICAL EXAM: Primary | ICD-10-CM

## 2019-09-09 DIAGNOSIS — K21.9 HIATAL HERNIA WITH GASTROESOPHAGEAL REFLUX: ICD-10-CM

## 2019-09-09 PROCEDURE — 90662 FLU VACCINE - HIGH DOSE (65+) PRESERVATIVE FREE IM: ICD-10-PCS | Mod: HCNC,S$GLB,, | Performed by: FAMILY MEDICINE

## 2019-09-09 PROCEDURE — G0008 ADMIN INFLUENZA VIRUS VAC: HCPCS | Mod: HCNC,S$GLB,, | Performed by: FAMILY MEDICINE

## 2019-09-09 PROCEDURE — 99999 PR PBB SHADOW E&M-EST. PATIENT-LVL III: CPT | Mod: PBBFAC,HCNC,, | Performed by: FAMILY MEDICINE

## 2019-09-09 PROCEDURE — 99397 PR PREVENTIVE VISIT,EST,65 & OVER: ICD-10-PCS | Mod: HCNC,25,S$GLB, | Performed by: FAMILY MEDICINE

## 2019-09-09 PROCEDURE — 3078F DIAST BP <80 MM HG: CPT | Mod: HCNC,CPTII,S$GLB, | Performed by: FAMILY MEDICINE

## 2019-09-09 PROCEDURE — G0008 FLU VACCINE - HIGH DOSE (65+) PRESERVATIVE FREE IM: ICD-10-PCS | Mod: HCNC,S$GLB,, | Performed by: FAMILY MEDICINE

## 2019-09-09 PROCEDURE — 3078F PR MOST RECENT DIASTOLIC BLOOD PRESSURE < 80 MM HG: ICD-10-PCS | Mod: HCNC,CPTII,S$GLB, | Performed by: FAMILY MEDICINE

## 2019-09-09 PROCEDURE — 90662 IIV NO PRSV INCREASED AG IM: CPT | Mod: HCNC,S$GLB,, | Performed by: FAMILY MEDICINE

## 2019-09-09 PROCEDURE — 3075F PR MOST RECENT SYSTOLIC BLOOD PRESS GE 130-139MM HG: ICD-10-PCS | Mod: HCNC,CPTII,S$GLB, | Performed by: FAMILY MEDICINE

## 2019-09-09 PROCEDURE — 3075F SYST BP GE 130 - 139MM HG: CPT | Mod: HCNC,CPTII,S$GLB, | Performed by: FAMILY MEDICINE

## 2019-09-09 PROCEDURE — 99999 PR PBB SHADOW E&M-EST. PATIENT-LVL III: ICD-10-PCS | Mod: PBBFAC,HCNC,, | Performed by: FAMILY MEDICINE

## 2019-09-09 PROCEDURE — 99397 PER PM REEVAL EST PAT 65+ YR: CPT | Mod: HCNC,25,S$GLB, | Performed by: FAMILY MEDICINE

## 2019-09-09 RX ORDER — TRAZODONE HYDROCHLORIDE 50 MG/1
50 TABLET ORAL NIGHTLY
Qty: 90 TABLET | Refills: 3 | Status: SHIPPED | OUTPATIENT
Start: 2019-09-09 | End: 2020-09-23

## 2019-09-09 RX ORDER — NYSTATIN 100000 [USP'U]/G
POWDER TOPICAL 4 TIMES DAILY
Qty: 60 G | Refills: 1 | Status: SHIPPED | OUTPATIENT
Start: 2019-09-09 | End: 2020-09-23

## 2019-09-09 RX ORDER — CLOTRIMAZOLE 1 %
CREAM (GRAM) TOPICAL 2 TIMES DAILY
Qty: 45 G | Refills: 0 | Status: SHIPPED | OUTPATIENT
Start: 2019-09-09 | End: 2020-09-23

## 2019-09-09 NOTE — ASSESSMENT & PLAN NOTE
Consult with neurosx  I have reviewed the patient's MRI she has says bilateral cyst at L5-S1 which is of unknown significance.  She has a spondylolisthesis at L3-4 and 4-5 which appears stable since 2014.  Unsure of the source of her new symptoms.  We will order an x-ray and CT scan of the lumbar spine for review.  She can follow up after the imaging is complete.

## 2019-09-09 NOTE — ASSESSMENT & PLAN NOTE
Lab Results   Component Value Date    LDLCALC 168.2 (H) 08/30/2019     Intolerant to statin  Opted for diet control  Can offer Zetia

## 2019-10-01 DIAGNOSIS — I10 ESSENTIAL HYPERTENSION: ICD-10-CM

## 2019-10-01 RX ORDER — VERAPAMIL HYDROCHLORIDE 120 MG/1
120 TABLET, FILM COATED ORAL 2 TIMES DAILY
Qty: 180 TABLET | Refills: 3 | Status: SHIPPED | OUTPATIENT
Start: 2019-10-01 | End: 2020-10-07

## 2019-10-01 NOTE — TELEPHONE ENCOUNTER
----- Message from Farhat Nicole sent at 10/1/2019 11:30 AM CDT -----  Contact: pt   Type:  RX Refill Request    Who Called:  Pt   Refill or New Rx:refill   RX Name and Strength:verapomil 120 mg   How is the patient currently taking it? (ex. 1XDay):twice daily   Is this a 30 day or 90 day RX: 90 days  Preferred Pharmacy with phone number:[facility]   Local or Mail Order:local   Ordering Provider Dr de los santos  Would the patient rather a call back or a response via [facility]? Phone   Best Call Back Number: [phone]  Additional Information: Pt is about to run out     [facility]   In [place]  [phone]

## 2019-10-03 DIAGNOSIS — I10 ESSENTIAL HYPERTENSION: ICD-10-CM

## 2019-10-03 RX ORDER — VERAPAMIL HYDROCHLORIDE 120 MG/1
120 TABLET, FILM COATED ORAL 2 TIMES DAILY
Qty: 180 TABLET | Refills: 3 | Status: CANCELLED | OUTPATIENT
Start: 2019-10-03

## 2019-10-03 NOTE — TELEPHONE ENCOUNTER
I apologize the request was sent to Elizabeth Jj NP and we forwarded it to the PCP . I will remove . Thanks //kah

## 2019-10-04 ENCOUNTER — LAB VISIT (OUTPATIENT)
Dept: LAB | Facility: HOSPITAL | Age: 84
End: 2019-10-04
Attending: PHYSICIAN ASSISTANT
Payer: MEDICARE

## 2019-10-04 ENCOUNTER — OFFICE VISIT (OUTPATIENT)
Dept: INTERNAL MEDICINE | Facility: CLINIC | Age: 84
End: 2019-10-04
Payer: MEDICARE

## 2019-10-04 VITALS
OXYGEN SATURATION: 95 % | HEART RATE: 77 BPM | BODY MASS INDEX: 28.33 KG/M2 | SYSTOLIC BLOOD PRESSURE: 132 MMHG | WEIGHT: 134.94 LBS | HEIGHT: 58 IN | DIASTOLIC BLOOD PRESSURE: 64 MMHG | TEMPERATURE: 97 F

## 2019-10-04 DIAGNOSIS — M79.674 TOE PAIN, RIGHT: ICD-10-CM

## 2019-10-04 DIAGNOSIS — M79.674 TOE PAIN, RIGHT: Primary | ICD-10-CM

## 2019-10-04 LAB
ALBUMIN SERPL BCP-MCNC: 3.7 G/DL (ref 3.5–5.2)
ALP SERPL-CCNC: 117 U/L (ref 55–135)
ALT SERPL W/O P-5'-P-CCNC: 20 U/L (ref 10–44)
ANION GAP SERPL CALC-SCNC: 9 MMOL/L (ref 8–16)
AST SERPL-CCNC: 15 U/L (ref 10–40)
BILIRUB SERPL-MCNC: 0.6 MG/DL (ref 0.1–1)
BUN SERPL-MCNC: 19 MG/DL (ref 8–23)
CALCIUM SERPL-MCNC: 10.2 MG/DL (ref 8.7–10.5)
CHLORIDE SERPL-SCNC: 104 MMOL/L (ref 95–110)
CO2 SERPL-SCNC: 29 MMOL/L (ref 23–29)
CREAT SERPL-MCNC: 0.8 MG/DL (ref 0.5–1.4)
EST. GFR  (AFRICAN AMERICAN): >60 ML/MIN/1.73 M^2
EST. GFR  (NON AFRICAN AMERICAN): >60 ML/MIN/1.73 M^2
GLUCOSE SERPL-MCNC: 103 MG/DL (ref 70–110)
POTASSIUM SERPL-SCNC: 4.5 MMOL/L (ref 3.5–5.1)
PROT SERPL-MCNC: 7.2 G/DL (ref 6–8.4)
SODIUM SERPL-SCNC: 142 MMOL/L (ref 136–145)
URATE SERPL-MCNC: 4.7 MG/DL (ref 2.4–5.7)

## 2019-10-04 PROCEDURE — 99214 PR OFFICE/OUTPT VISIT, EST, LEVL IV, 30-39 MIN: ICD-10-PCS | Mod: HCNC,S$GLB,, | Performed by: PHYSICIAN ASSISTANT

## 2019-10-04 PROCEDURE — 3075F PR MOST RECENT SYSTOLIC BLOOD PRESS GE 130-139MM HG: ICD-10-PCS | Mod: HCNC,CPTII,S$GLB, | Performed by: PHYSICIAN ASSISTANT

## 2019-10-04 PROCEDURE — 99999 PR PBB SHADOW E&M-EST. PATIENT-LVL III: ICD-10-PCS | Mod: PBBFAC,HCNC,, | Performed by: PHYSICIAN ASSISTANT

## 2019-10-04 PROCEDURE — 99214 OFFICE O/P EST MOD 30 MIN: CPT | Mod: HCNC,S$GLB,, | Performed by: PHYSICIAN ASSISTANT

## 2019-10-04 PROCEDURE — 3075F SYST BP GE 130 - 139MM HG: CPT | Mod: HCNC,CPTII,S$GLB, | Performed by: PHYSICIAN ASSISTANT

## 2019-10-04 PROCEDURE — 3078F DIAST BP <80 MM HG: CPT | Mod: HCNC,CPTII,S$GLB, | Performed by: PHYSICIAN ASSISTANT

## 2019-10-04 PROCEDURE — 1101F PT FALLS ASSESS-DOCD LE1/YR: CPT | Mod: HCNC,CPTII,S$GLB, | Performed by: PHYSICIAN ASSISTANT

## 2019-10-04 PROCEDURE — 1101F PR PT FALLS ASSESS DOC 0-1 FALLS W/OUT INJ PAST YR: ICD-10-PCS | Mod: HCNC,CPTII,S$GLB, | Performed by: PHYSICIAN ASSISTANT

## 2019-10-04 PROCEDURE — 3078F PR MOST RECENT DIASTOLIC BLOOD PRESSURE < 80 MM HG: ICD-10-PCS | Mod: HCNC,CPTII,S$GLB, | Performed by: PHYSICIAN ASSISTANT

## 2019-10-04 PROCEDURE — 36415 COLL VENOUS BLD VENIPUNCTURE: CPT | Mod: HCNC

## 2019-10-04 PROCEDURE — 80053 COMPREHEN METABOLIC PANEL: CPT | Mod: HCNC

## 2019-10-04 PROCEDURE — 99999 PR PBB SHADOW E&M-EST. PATIENT-LVL III: CPT | Mod: PBBFAC,HCNC,, | Performed by: PHYSICIAN ASSISTANT

## 2019-10-04 PROCEDURE — 84550 ASSAY OF BLOOD/URIC ACID: CPT | Mod: HCNC

## 2019-10-04 RX ORDER — INDOMETHACIN 50 MG/1
50 CAPSULE ORAL
Qty: 30 CAPSULE | Refills: 0 | Status: SHIPPED | OUTPATIENT
Start: 2019-10-04 | End: 2019-10-04 | Stop reason: SDUPTHER

## 2019-10-04 RX ORDER — INDOMETHACIN 50 MG/1
50 CAPSULE ORAL
Qty: 30 CAPSULE | Refills: 0 | Status: SHIPPED | OUTPATIENT
Start: 2019-10-04 | End: 2020-01-20

## 2019-10-04 RX ORDER — GUAIFENESIN AND PHENYLEPHRINE HCL 400; 10 MG/1; MG/1
500 TABLET ORAL
COMMUNITY
End: 2021-08-23

## 2019-10-04 RX ORDER — CLINDAMYCIN HYDROCHLORIDE 150 MG/1
150 CAPSULE ORAL 3 TIMES DAILY
Qty: 30 CAPSULE | Refills: 0 | Status: SHIPPED | OUTPATIENT
Start: 2019-10-04 | End: 2019-10-04 | Stop reason: SDUPTHER

## 2019-10-04 RX ORDER — CLINDAMYCIN HYDROCHLORIDE 150 MG/1
150 CAPSULE ORAL 3 TIMES DAILY
Qty: 30 CAPSULE | Refills: 0 | Status: SHIPPED | OUTPATIENT
Start: 2019-10-04 | End: 2020-01-20

## 2019-10-04 NOTE — PROGRESS NOTES
Subjective:       Patient ID: Isamar Roe is a 84 y.o. female.    Chief Complaint: Foot Pain    Toe Pain    The incident occurred 3 to 5 days ago. There was no injury mechanism. The pain is present in the right toes. The quality of the pain is described as aching. The pain is moderate. The pain has been constant since onset. Pertinent negatives include no inability to bear weight, loss of motion, loss of sensation, muscle weakness, numbness or tingling. She reports no foreign bodies present. The symptoms are aggravated by movement and palpation. She has tried nothing for the symptoms.     Past Medical History:   Diagnosis Date    Arthritis     hands    Benign hematuria     shingleton    Calcific tendinitis of left shoulder 1/27/2016    Cataract     Diverticulosis     colonoscopy 5/22/2012    Duodenal diverticulum     EGD 5/22/2012    GERD (gastroesophageal reflux disease)     Hemorrhoids     colonoscopy 5/22/2012    History of skin cancer     per patient s/p excision, no chemo or radiation, sees outside derm, Dr. Tinsley.    Hyperlipidemia     Hypertension     Impingement syndrome of left shoulder 1/27/2016    Osteoporosis     declines tx;11/17/16 phone note    Pneumonia     as a child    Sciatica     santhosh; dr castillo    Skin cancer     Dr. Noriega    Vitamin D deficiency disease        Review of Systems   Constitutional: Negative for chills and fatigue.   Respiratory: Negative for chest tightness and shortness of breath.    Cardiovascular: Negative for chest pain.   Gastrointestinal: Negative for abdominal pain.   Neurological: Negative for tingling and numbness.       Objective:      Physical Exam   Constitutional: She appears well-developed and well-nourished. No distress.   Neck: Neck supple.   Cardiovascular: Normal rate and regular rhythm. Exam reveals no gallop and no friction rub.   No murmur heard.  Pulmonary/Chest: Effort normal and breath sounds normal. No stridor. No respiratory distress.  She has no wheezes. She has no rales. She exhibits no tenderness.   Musculoskeletal:        Right foot: There is tenderness and swelling.        Feet:    Lymphadenopathy:     She has no cervical adenopathy.   Skin: She is not diaphoretic.   Nursing note and vitals reviewed.      Assessment:       1. Toe pain, right        Plan:       Toe pain, right  -     Comprehensive metabolic panel; Future; Expected date: 10/04/2019  -     Uric acid; Future; Expected date: 10/04/2019    Other orders  -     Discontinue: clindamycin (CLEOCIN) 150 MG capsule; Take 1 capsule (150 mg total) by mouth 3 (three) times daily.  Dispense: 30 capsule; Refill: 0  -     Discontinue: indomethacin (INDOCIN) 50 MG capsule; Take 1 capsule (50 mg total) by mouth 3 (three) times daily with meals.  Dispense: 30 capsule; Refill: 0  -     indomethacin (INDOCIN) 50 MG capsule; Take 1 capsule (50 mg total) by mouth 3 (three) times daily with meals.  Dispense: 30 capsule; Refill: 0  -     clindamycin (CLEOCIN) 150 MG capsule; Take 1 capsule (150 mg total) by mouth 3 (three) times daily.  Dispense: 30 capsule; Refill: 0

## 2019-11-19 ENCOUNTER — PATIENT OUTREACH (OUTPATIENT)
Dept: ADMINISTRATIVE | Facility: HOSPITAL | Age: 84
End: 2019-11-19

## 2020-01-20 ENCOUNTER — OFFICE VISIT (OUTPATIENT)
Dept: INTERNAL MEDICINE | Facility: CLINIC | Age: 85
End: 2020-01-20
Payer: MEDICARE

## 2020-01-20 ENCOUNTER — TELEPHONE (OUTPATIENT)
Dept: INTERNAL MEDICINE | Facility: CLINIC | Age: 85
End: 2020-01-20

## 2020-01-20 VITALS
DIASTOLIC BLOOD PRESSURE: 60 MMHG | OXYGEN SATURATION: 96 % | SYSTOLIC BLOOD PRESSURE: 141 MMHG | TEMPERATURE: 96 F | HEART RATE: 84 BPM | BODY MASS INDEX: 27.83 KG/M2 | WEIGHT: 133.19 LBS

## 2020-01-20 DIAGNOSIS — I10 HYPERTENSION, UNSPECIFIED TYPE: Primary | ICD-10-CM

## 2020-01-20 PROCEDURE — 1101F PR PT FALLS ASSESS DOC 0-1 FALLS W/OUT INJ PAST YR: ICD-10-PCS | Mod: HCNC,CPTII,S$GLB, | Performed by: FAMILY MEDICINE

## 2020-01-20 PROCEDURE — 99213 OFFICE O/P EST LOW 20 MIN: CPT | Mod: HCNC,S$GLB,, | Performed by: FAMILY MEDICINE

## 2020-01-20 PROCEDURE — 1126F PR PAIN SEVERITY QUANTIFIED, NO PAIN PRESENT: ICD-10-PCS | Mod: HCNC,S$GLB,, | Performed by: FAMILY MEDICINE

## 2020-01-20 PROCEDURE — 99213 PR OFFICE/OUTPT VISIT, EST, LEVL III, 20-29 MIN: ICD-10-PCS | Mod: HCNC,S$GLB,, | Performed by: FAMILY MEDICINE

## 2020-01-20 PROCEDURE — 1126F AMNT PAIN NOTED NONE PRSNT: CPT | Mod: HCNC,S$GLB,, | Performed by: FAMILY MEDICINE

## 2020-01-20 PROCEDURE — 1159F MED LIST DOCD IN RCRD: CPT | Mod: HCNC,S$GLB,, | Performed by: FAMILY MEDICINE

## 2020-01-20 PROCEDURE — 99999 PR PBB SHADOW E&M-EST. PATIENT-LVL III: CPT | Mod: PBBFAC,HCNC,, | Performed by: FAMILY MEDICINE

## 2020-01-20 PROCEDURE — 3077F PR MOST RECENT SYSTOLIC BLOOD PRESSURE >= 140 MM HG: ICD-10-PCS | Mod: HCNC,CPTII,S$GLB, | Performed by: FAMILY MEDICINE

## 2020-01-20 PROCEDURE — 1159F PR MEDICATION LIST DOCUMENTED IN MEDICAL RECORD: ICD-10-PCS | Mod: HCNC,S$GLB,, | Performed by: FAMILY MEDICINE

## 2020-01-20 PROCEDURE — 3078F DIAST BP <80 MM HG: CPT | Mod: HCNC,CPTII,S$GLB, | Performed by: FAMILY MEDICINE

## 2020-01-20 PROCEDURE — 99999 PR PBB SHADOW E&M-EST. PATIENT-LVL III: ICD-10-PCS | Mod: PBBFAC,HCNC,, | Performed by: FAMILY MEDICINE

## 2020-01-20 PROCEDURE — 1101F PT FALLS ASSESS-DOCD LE1/YR: CPT | Mod: HCNC,CPTII,S$GLB, | Performed by: FAMILY MEDICINE

## 2020-01-20 PROCEDURE — 3077F SYST BP >= 140 MM HG: CPT | Mod: HCNC,CPTII,S$GLB, | Performed by: FAMILY MEDICINE

## 2020-01-20 PROCEDURE — 3078F PR MOST RECENT DIASTOLIC BLOOD PRESSURE < 80 MM HG: ICD-10-PCS | Mod: HCNC,CPTII,S$GLB, | Performed by: FAMILY MEDICINE

## 2020-01-20 RX ORDER — HYDROCHLOROTHIAZIDE 12.5 MG/1
12.5 CAPSULE ORAL DAILY
Qty: 30 CAPSULE | Refills: 0 | Status: SHIPPED | OUTPATIENT
Start: 2020-01-20 | End: 2020-02-14

## 2020-01-20 NOTE — TELEPHONE ENCOUNTER
----- Message from Tammie Cat sent at 1/20/2020  7:12 AM CST -----  Contact: pt  Type:  Same Day Appointment Request    Caller is requesting a same day appointment.  Caller declined first available appointment listed below.    Name of Caller: the pt   When is the first available appointment? 01/23/2020  Symptoms: high b/p  Best Call Back Number: [phone]  Additional Information:n/a

## 2020-01-20 NOTE — TELEPHONE ENCOUNTER
Called and spoke with patient regarding her high bp. She is seeing Dr. Alston today at 9:20. This morning her BP was 162/82 yesterday it was 183/82. She wanted to come in to make sure everything was ok for her BP

## 2020-01-20 NOTE — PROGRESS NOTES
Subjective:       Patient ID: Isamar Roe is a 84 y.o. female.    Chief Complaint: No chief complaint on file.    HPI     85 yo     Past Medical History:   Diagnosis Date    Arthritis     hands    Benign hematuria     shingleton    Calcific tendinitis of left shoulder 1/27/2016    Cataract     Diverticulosis     colonoscopy 5/22/2012    Duodenal diverticulum     EGD 5/22/2012    GERD (gastroesophageal reflux disease)     Hemorrhoids     colonoscopy 5/22/2012    History of skin cancer     per patient s/p excision, no chemo or radiation, sees outside derm, Dr. Tinsley.    Hyperlipidemia     Hypertension     Impingement syndrome of left shoulder 1/27/2016    Osteoporosis     declines tx;11/17/16 phone note    Pneumonia     as a child    Sciatica     santhosh; dr castillo    Skin cancer     Dr. Noriega    Vitamin D deficiency disease      Presents today to discuss blood pressure  Has been concerned as it is running to high.    Has a machine at home.  Just bought one a few days ago.    She feels her normal self at present with no active complaints    Yesterday around 1:30 pm  She reports that she was sitting in chair and found behind her arms and elbows - it got cold  Both arms  No SOB  No dizziness at all  No chest pain  Lasted for a few seconds.  Did not hurt to move.  She checked her pressure and it was running 183/82 - then 173/86.    Checked again this morning - it was 161/82     is concerned about her anxiety.    Noted she had a stress test in 2014  Impression: NORMAL MYOCARDIAL PERFUSION  1. The perfusion scan is free of evidence for myocardial ischemia or injury.   2. Resting wall motion is physiologic.   3. Resting LV function is normal.   4. The ventricular volumes are normal at rest and stress.   5. The extracardiac distribution of radioactivity is normal.       Review of Systems   Respiratory: Negative for shortness of breath.    Cardiovascular: Negative for chest pain and palpitations.  "  Musculoskeletal: Negative for neck pain.   Neurological: Negative for headaches.       Objective:       Vitals:    01/20/20 0930   BP: (!) 141/60   Pulse:    Temp:        Physical Exam   Constitutional: She is oriented to person, place, and time. She appears well-developed. She does not have a sickly appearance. No distress.   HENT:   Head: Normocephalic and atraumatic.   Eyes: Conjunctivae and lids are normal.   Neck: Normal range of motion and full passive range of motion without pain.   Cardiovascular: Normal rate, regular rhythm and normal heart sounds.   Pulmonary/Chest: Effort normal and breath sounds normal. No respiratory distress.   Abdominal: Soft. Normal appearance. She exhibits no distension.   Musculoskeletal: Normal range of motion.   Lymphadenopathy:     She has no cervical adenopathy.   Neurological: She is alert and oriented to person, place, and time. She is not disoriented.   Skin: Skin is intact. No pallor.   Psychiatric: She has a normal mood and affect. Her speech is normal and behavior is normal. Thought content normal.       Assessment:       1. Hypertension, unspecified type        Plan:     HTN  At present sole BP med is Verapamil 120 mg   Noted on her past few days it has been significantly elevated  I do want to reduce a small amount - but recognizing her age and risk of hypotension - seeking limited / safe BP reduction  Starting low dose HCTZ today  I ask she logs and brings log and machine in to her PCP w/in next 10 -14 days  Discussed hydration  Noted on chart "allergy" to HCTZ -   Not a true allergy - reported a funny taste / tongue coating ( no SOB/ oral swelling)  i'm uncertain of this history but feel it would be safe to try again.   Discussed if develops any chest pain or concerning symptoms to present to ER and not wait for PCP        "

## 2020-02-03 ENCOUNTER — OFFICE VISIT (OUTPATIENT)
Dept: INTERNAL MEDICINE | Facility: CLINIC | Age: 85
End: 2020-02-03
Payer: MEDICARE

## 2020-02-03 VITALS
OXYGEN SATURATION: 95 % | HEIGHT: 58 IN | SYSTOLIC BLOOD PRESSURE: 136 MMHG | DIASTOLIC BLOOD PRESSURE: 70 MMHG | BODY MASS INDEX: 28.09 KG/M2 | TEMPERATURE: 99 F | WEIGHT: 133.81 LBS | HEART RATE: 96 BPM

## 2020-02-03 DIAGNOSIS — I70.0 ARTERIOSCLEROSIS OF AORTA: ICD-10-CM

## 2020-02-03 DIAGNOSIS — I10 ESSENTIAL HYPERTENSION: Primary | Chronic | ICD-10-CM

## 2020-02-03 DIAGNOSIS — M46.1 SACROILIITIS, NOT ELSEWHERE CLASSIFIED: ICD-10-CM

## 2020-02-03 PROCEDURE — 99213 PR OFFICE/OUTPT VISIT, EST, LEVL III, 20-29 MIN: ICD-10-PCS | Mod: HCNC,S$GLB,, | Performed by: FAMILY MEDICINE

## 2020-02-03 PROCEDURE — 99999 PR PBB SHADOW E&M-EST. PATIENT-LVL IV: CPT | Mod: PBBFAC,HCNC,, | Performed by: FAMILY MEDICINE

## 2020-02-03 PROCEDURE — 1159F PR MEDICATION LIST DOCUMENTED IN MEDICAL RECORD: ICD-10-PCS | Mod: HCNC,S$GLB,, | Performed by: FAMILY MEDICINE

## 2020-02-03 PROCEDURE — 1159F MED LIST DOCD IN RCRD: CPT | Mod: HCNC,S$GLB,, | Performed by: FAMILY MEDICINE

## 2020-02-03 PROCEDURE — 1101F PT FALLS ASSESS-DOCD LE1/YR: CPT | Mod: HCNC,CPTII,S$GLB, | Performed by: FAMILY MEDICINE

## 2020-02-03 PROCEDURE — 1126F PR PAIN SEVERITY QUANTIFIED, NO PAIN PRESENT: ICD-10-PCS | Mod: HCNC,S$GLB,, | Performed by: FAMILY MEDICINE

## 2020-02-03 PROCEDURE — 3078F PR MOST RECENT DIASTOLIC BLOOD PRESSURE < 80 MM HG: ICD-10-PCS | Mod: HCNC,CPTII,S$GLB, | Performed by: FAMILY MEDICINE

## 2020-02-03 PROCEDURE — 3078F DIAST BP <80 MM HG: CPT | Mod: HCNC,CPTII,S$GLB, | Performed by: FAMILY MEDICINE

## 2020-02-03 PROCEDURE — 99499 RISK ADDL DX/OHS AUDIT: ICD-10-PCS | Mod: HCNC,S$GLB,, | Performed by: FAMILY MEDICINE

## 2020-02-03 PROCEDURE — 99213 OFFICE O/P EST LOW 20 MIN: CPT | Mod: HCNC,S$GLB,, | Performed by: FAMILY MEDICINE

## 2020-02-03 PROCEDURE — 99999 PR PBB SHADOW E&M-EST. PATIENT-LVL IV: ICD-10-PCS | Mod: PBBFAC,HCNC,, | Performed by: FAMILY MEDICINE

## 2020-02-03 PROCEDURE — 99499 UNLISTED E&M SERVICE: CPT | Mod: HCNC,S$GLB,, | Performed by: FAMILY MEDICINE

## 2020-02-03 PROCEDURE — 1126F AMNT PAIN NOTED NONE PRSNT: CPT | Mod: HCNC,S$GLB,, | Performed by: FAMILY MEDICINE

## 2020-02-03 PROCEDURE — 3075F PR MOST RECENT SYSTOLIC BLOOD PRESS GE 130-139MM HG: ICD-10-PCS | Mod: HCNC,CPTII,S$GLB, | Performed by: FAMILY MEDICINE

## 2020-02-03 PROCEDURE — 1101F PR PT FALLS ASSESS DOC 0-1 FALLS W/OUT INJ PAST YR: ICD-10-PCS | Mod: HCNC,CPTII,S$GLB, | Performed by: FAMILY MEDICINE

## 2020-02-03 PROCEDURE — 3075F SYST BP GE 130 - 139MM HG: CPT | Mod: HCNC,CPTII,S$GLB, | Performed by: FAMILY MEDICINE

## 2020-02-03 NOTE — PROGRESS NOTES
Isamar Roe  02/03/2020  [id number]    Marina Garibay MD  Patient Care Team:  Marina Garibay MD as PCP - General (Family Medicine)  CASEY Giang OD as Consulting Physician (Optometry)  Reinaldo Noriega III, MD (Dermatology)  Leticia Maya MD as Consulting Physician (Endocrinology)  Oscar Hanson LPN as Care Coordinator (Internal Medicine)  Gunnar Almanzar MD as Consulting Physician (Pain Medicine)  Reinaldo Noriega III, MD (Dermatology)  Has the patient seen any provider outside of the [facility] network since the last visit? (no). If yes, HIPPA forms completed and records requested.        Visit Type:a scheduled routine follow-up visit    Chief Complaint:  Chief Complaint   Patient presents with    Follow-up     2wk f/u       History of Present Illness:    84 year old here for follow up.  She saw Dr. Alston 2 week ago for HTN.  He added low does HCTZ and was told to follow up with Logs today in office.  She is on Verapamil 120 mg daily as well  (annual due in Sept 2020)    She did bring a log  BP better since adding HCTZ. No major issues with the meds.     She takes in am, says urinates more.     She brought the machine for BP.  It was calibrated, and it is within 3 points.    Lower back pain, on tumeric. Stable. Uses Tylenol    Atherscolosis- declines Statin      History:  Past Medical History:   Diagnosis Date    Arthritis     hands    Benign hematuria     shingleton    Calcific tendinitis of left shoulder 1/27/2016    Cataract     Diverticulosis     colonoscopy 5/22/2012    Duodenal diverticulum     EGD 5/22/2012    GERD (gastroesophageal reflux disease)     Hemorrhoids     colonoscopy 5/22/2012    History of skin cancer     per patient s/p excision, no chemo or radiation, sees outside derm, Dr. Tinsley.    Hyperlipidemia     Hypertension     Impingement syndrome of left shoulder 1/27/2016    Osteoporosis     declines tx;11/17/16 phone note    Pneumonia     as a child    Sciatica     santhosh; dr castillo     Skin cancer     Dr. Noriega    Vitamin D deficiency disease      Past Surgical History:   Procedure Laterality Date    APPENDECTOMY      CARPAL TUNNEL RELEASE Bilateral     CATARACT EXTRACTION W/  INTRAOCULAR LENS IMPLANT Left 05/01/2019    CATARACT EXTRACTION W/  INTRAOCULAR LENS IMPLANT Right 05/15/2019    CHOLECYSTECTOMY      HYSTERECTOMY      joint removal in right thumb      SKIN CANCER EXCISION      SPINE SURGERY  9/16/14    L4/5 laminectomy;dr sagastume    TOE SURGERY      LT FIFTH     Family History   Problem Relation Age of Onset    Heart disease Mother     Hypertension Mother     Cataracts Mother     Heart disease Father     Hypertension Father     Cataracts Father     Heart disease Brother 45    Heart disease Sister         A-fib    Heart disease Sister         heart bypass x 5    Kidney disease Brother 70    Heart disease Brother     Cancer Maternal Aunt         breast    Diabetes Maternal Grandmother     Diabetes Maternal Aunt     Hypertension Other         multiple family members    Cancer Other         multiple with skin cancer    Stroke Neg Hx     Melanoma Neg Hx     Hyperlipidemia Neg Hx      Social History     Socioeconomic History    Marital status:      Spouse name: AMBREEN    Number of children: 4    Years of education: Not on file    Highest education level: Not on file   Occupational History    Not on file   Social Needs    Financial resource strain: Not hard at all    Food insecurity:     Worry: Never true     Inability: Never true    Transportation needs:     Medical: No     Non-medical: No   Tobacco Use    Smoking status: Never Smoker    Smokeless tobacco: Never Used   Substance and Sexual Activity    Alcohol use: No     Frequency: Never     Binge frequency: Never    Drug use: No    Sexual activity: Yes     Partners: Male     Birth control/protection: See Surgical Hx   Lifestyle    Physical activity:     Days per week: 0 days     Minutes per  session: Not on file    Stress: To some extent   Relationships    Social connections:     Talks on phone: More than three times a week     Gets together: Not on file     Attends Yarsani service: Not on file     Active member of club or organization: Yes     Attends meetings of clubs or organizations: Not on file     Relationship status:    Other Topics Concern    Are you pregnant or think you may be? No    Breast-feeding No   Social History Narrative    Wears a seatbelt.     Patient Active Problem List   Diagnosis    HTN (hypertension)    Hyperlipidemia    Hiatal hernia with gastroesophageal reflux    Lumbar radiculopathy    Osteoporosis    Nuclear sclerosis of both eyes    Choroidal nevus of right eye    Arteriosclerosis of aorta    Heart valve regurgitation    History of skin cancer    Statin intolerance    Pseudophakia    Sacroiliitis, not elsewhere classified     Review of patient's allergies indicates:   Allergen Reactions    Meloxicam Swelling    Ace inhibitors      Other reaction(s): cough    Amlodipine      Leg swelling    Chlorthalidone      Other reaction(s): ? dizzy  Other reaction(s): ? dizzy    Codeine Nausea And Vomiting    Demerol (pf)  [meperidine (pf)]      Other reaction(s): Itching    Fosamax  [alendronate]      Other reaction(s): aches    Fosamax plus d  [alendronate-vitamin d3]      Other reaction(s): aches    Hydrochlorothiazide (bulk)      Other reaction(s): bad taste    Hydrocodone-acetaminophen      Other reaction(s): Stomach upset  Other reaction(s): Nausea    Ibandronate      Other reaction(s): aches  Other reaction(s): aches    Ibandronate sodium      Other reaction(s): Unknown    Losartan Diarrhea    Meperidine Itching    Opioids - morphine analogues     Opium     Statins-hmg-coa reductase inhibitors      Few statins intol    Toprol xl  [metoprolol succinate]      Other reaction(s): diarrhea  Other reaction(s): diarrhea    Tramadol        The  following were reviewed at this visit: active problem list, medication list, allergies, family history, social history, and health maintenance.    Medications:  Current Outpatient Medications on File Prior to Visit   Medication Sig Dispense Refill    calcium-vitamin D 250-100 mg-unit per tablet Take 1 tablet by mouth.      hydroCHLOROthiazide (MICROZIDE) 12.5 mg capsule Take 1 capsule (12.5 mg total) by mouth once daily. 30 capsule 0    omega 3-dha-epa-fish oil 900-1,400 mg CpDR Take 1,600 mg by mouth.      omega-3/dha/epa/fish oil (OMEGA-3 FISH OIL ORAL) Take by mouth.      traZODone (DESYREL) 50 MG tablet Take 1 tablet (50 mg total) by mouth every evening. 90 tablet 3    turmeric root extract 500 mg Cap Take 500 mg by mouth.      verapamil (CALAN) 120 MG tablet Take 1 tablet (120 mg total) by mouth 2 (two) times daily. 180 tablet 3    vitamin E 400 UNIT capsule Take 400 Units by mouth once daily.      clotrimazole (LOTRIMIN) 1 % cream Apply topically 2 (two) times daily. (Patient not taking: Reported on 2/3/2020) 45 g 0    nystatin (MYCOSTATIN) powder Apply topically 4 (four) times daily. (Patient not taking: Reported on 2/3/2020) 60 g 1     No current facility-administered medications on file prior to visit.        Medications have been reviewed and reconciled with patient at this visit.  Barriers to medications present (no)    Adverse reactions to current medications (no)    Over the counter medications reviewed (Yes ), and if needed added to active Medication list at this visit.     Exam:  Wt Readings from Last 3 Encounters:   02/03/20 60.7 kg (133 lb 13.1 oz)   01/20/20 60.4 kg (133 lb 2.5 oz)   10/04/19 61.2 kg (134 lb 14.7 oz)     Temp Readings from Last 3 Encounters:   02/03/20 98.8 °F (37.1 °C) (Tympanic)   01/20/20 96 °F (35.6 °C)   10/04/19 97.2 °F (36.2 °C) (Tympanic)     BP Readings from Last 3 Encounters:   02/03/20 136/70   01/20/20 (!) 141/60   10/04/19 132/64     Pulse Readings from Last  3 Encounters:   02/03/20 96   01/20/20 84   10/04/19 77     Body mass index is 27.97 kg/m².      Review of Systems   Constitutional: Negative.  Negative for chills and fever.   HENT: Negative.  Negative for congestion, sinus pain and sore throat.    Eyes: Negative for blurred vision and double vision.   Respiratory: Negative for cough, sputum production, shortness of breath and wheezing.    Cardiovascular: Negative for chest pain, palpitations and leg swelling.   Gastrointestinal: Negative for abdominal pain, constipation, diarrhea, heartburn, nausea and vomiting.   Genitourinary: Negative.    Musculoskeletal: Negative.    Skin: Negative.  Negative for rash.   Neurological: Negative.    Endo/Heme/Allergies: Negative.  Negative for polydipsia. Does not bruise/bleed easily.   Psychiatric/Behavioral: Negative for depression and substance abuse.     Physical Exam   Constitutional: She appears well-developed and well-nourished.   HENT:   Head: Atraumatic.   Eyes: Pupils are equal, round, and reactive to light.   Neck: Normal range of motion. Neck supple.   Cardiovascular: Normal rate, regular rhythm and normal heart sounds.   Pulmonary/Chest: Effort normal and breath sounds normal.   Neurological: She is alert.   Psychiatric: She has a normal mood and affect. Her behavior is normal. Judgment and thought content normal.   Vitals reviewed.      Laboratory Reviewed ({N/A)  Lab Results   Component Value Date    WBC 5.56 04/18/2018    HGB 13.6 04/18/2018    HCT 43.3 04/18/2018     04/18/2018    CHOL 275 (H) 08/30/2019    TRIG 194 (H) 08/30/2019    HDL 68 08/30/2019    ALT 20 10/04/2019    AST 15 10/04/2019     10/04/2019    K 4.5 10/04/2019     10/04/2019    CREATININE 0.8 10/04/2019    BUN 19 10/04/2019    CO2 29 10/04/2019    TSH 1.292 07/10/2013    INR 1.0 02/02/2015       Isamar was seen today for follow-up.    Diagnoses and all orders for this visit:    Essential hypertension    Sacroiliitis, not  elsewhere classified    Arteriosclerosis of aorta    BP in goal range  Continue meds.    Arteriosclerosis of aorta  Declines statin              Care Plan/Goals: Reviewed  (Yes)  Goals    None         Follow up: No follow-ups on file.    After visit summary was printed and given to patient upon discharge today.  Patient goals and care plan are included in After Visit Summary.

## 2020-02-07 ENCOUNTER — PES CALL (OUTPATIENT)
Dept: ADMINISTRATIVE | Facility: CLINIC | Age: 85
End: 2020-02-07

## 2020-02-14 RX ORDER — HYDROCHLOROTHIAZIDE 12.5 MG/1
12.5 CAPSULE ORAL DAILY
Qty: 30 CAPSULE | Refills: 0 | Status: SHIPPED | OUTPATIENT
Start: 2020-02-14 | End: 2020-03-13

## 2020-03-13 RX ORDER — HYDROCHLOROTHIAZIDE 12.5 MG/1
12.5 CAPSULE ORAL DAILY
Qty: 90 CAPSULE | Refills: 1 | Status: SHIPPED | OUTPATIENT
Start: 2020-03-13 | End: 2020-07-10

## 2020-03-31 ENCOUNTER — TELEPHONE (OUTPATIENT)
Dept: FAMILY MEDICINE | Facility: CLINIC | Age: 85
End: 2020-03-31

## 2020-07-27 ENCOUNTER — OFFICE VISIT (OUTPATIENT)
Dept: INTERNAL MEDICINE | Facility: CLINIC | Age: 85
End: 2020-07-27
Payer: MEDICARE

## 2020-07-27 VITALS
DIASTOLIC BLOOD PRESSURE: 72 MMHG | OXYGEN SATURATION: 97 % | HEART RATE: 89 BPM | BODY MASS INDEX: 27.78 KG/M2 | WEIGHT: 137.81 LBS | SYSTOLIC BLOOD PRESSURE: 126 MMHG | HEIGHT: 59 IN

## 2020-07-27 DIAGNOSIS — M85.80 OSTEOPENIA, UNSPECIFIED LOCATION: ICD-10-CM

## 2020-07-27 DIAGNOSIS — Z78.9 STATIN INTOLERANCE: ICD-10-CM

## 2020-07-27 DIAGNOSIS — Z00.00 ENCOUNTER FOR PREVENTIVE HEALTH EXAMINATION: Primary | ICD-10-CM

## 2020-07-27 DIAGNOSIS — I70.0 ARTERIOSCLEROSIS OF AORTA: ICD-10-CM

## 2020-07-27 DIAGNOSIS — Z91.89 POTENTIAL FOR COGNITIVE IMPAIRMENT: ICD-10-CM

## 2020-07-27 DIAGNOSIS — E78.5 HYPERLIPIDEMIA, UNSPECIFIED HYPERLIPIDEMIA TYPE: Chronic | ICD-10-CM

## 2020-07-27 DIAGNOSIS — I10 ESSENTIAL HYPERTENSION: Chronic | ICD-10-CM

## 2020-07-27 DIAGNOSIS — R94.120 ABNORMAL HEARING SCREEN: ICD-10-CM

## 2020-07-27 DIAGNOSIS — Z85.828 HISTORY OF SKIN CANCER: ICD-10-CM

## 2020-07-27 PROCEDURE — G0439 PR MEDICARE ANNUAL WELLNESS SUBSEQUENT VISIT: ICD-10-PCS | Mod: HCNC,S$GLB,, | Performed by: NURSE PRACTITIONER

## 2020-07-27 PROCEDURE — 99999 PR PBB SHADOW E&M-EST. PATIENT-LVL IV: CPT | Mod: PBBFAC,HCNC,, | Performed by: NURSE PRACTITIONER

## 2020-07-27 PROCEDURE — G0439 PPPS, SUBSEQ VISIT: HCPCS | Mod: HCNC,S$GLB,, | Performed by: NURSE PRACTITIONER

## 2020-07-27 PROCEDURE — 99999 PR PBB SHADOW E&M-EST. PATIENT-LVL IV: ICD-10-PCS | Mod: PBBFAC,HCNC,, | Performed by: NURSE PRACTITIONER

## 2020-07-27 PROCEDURE — 3074F SYST BP LT 130 MM HG: CPT | Mod: HCNC,CPTII,S$GLB, | Performed by: NURSE PRACTITIONER

## 2020-07-27 PROCEDURE — 3078F PR MOST RECENT DIASTOLIC BLOOD PRESSURE < 80 MM HG: ICD-10-PCS | Mod: HCNC,CPTII,S$GLB, | Performed by: NURSE PRACTITIONER

## 2020-07-27 PROCEDURE — 3078F DIAST BP <80 MM HG: CPT | Mod: HCNC,CPTII,S$GLB, | Performed by: NURSE PRACTITIONER

## 2020-07-27 PROCEDURE — 3074F PR MOST RECENT SYSTOLIC BLOOD PRESSURE < 130 MM HG: ICD-10-PCS | Mod: HCNC,CPTII,S$GLB, | Performed by: NURSE PRACTITIONER

## 2020-07-27 NOTE — PATIENT INSTRUCTIONS
Counseling and Referral of Other Preventative  (Italic type indicates deductible and co-insurance are waived)    Patient Name: Isamar Roe  Today's Date: 7/27/2020    Health Maintenance       Date Due Completion Date    TETANUS VACCINE 06/25/2018 6/25/2008    Lipid Panel 08/30/2020 8/30/2019    Influenza Vaccine (1) 09/01/2020 9/9/2019    DEXA SCAN 02/04/2021 2/4/2019    Override on 7/5/2012: Done (Recheck 2 years)        No orders of the defined types were placed in this encounter.    The following information is provided to all patients.  This information is to help you find resources for any of the problems found today that may be affecting your health:                Living healthy guide: www.[facility].[place].gov      Understanding Diabetes: www.diabetes.org      Eating healthy: www.cdc.gov/healthyweight      [facility] home safety checklist: www.cdc.gov/steadi/patient.html      Agency on Aging: www.goea.[place].[facility]      Alcoholics anonymous (AA): www.aa.org      Physical Activity: www.juan.nih.gov/ie8qteg      Tobacco use: www.quitwithusla.org

## 2020-07-27 NOTE — PROGRESS NOTES
"  Isamar Roe presented for a  Medicare AWV and comprehensive Health Risk Assessment today. The following components were reviewed and updated:    · Medical history  · Family History  · Social history  · Allergies and Current Medications  · Health Risk Assessment  · Health Maintenance  · Care Team     ** See Completed Assessments for Annual Wellness Visit within the encounter summary.**         The following assessments were completed:  · Living Situation  · CAGE  · Depression Screening  · Timed Get Up and Go  · Whisper Test  · Cognitive Function Screening  · Nutrition Screening  · ADL Screening  · PAQ Screening        Vitals:    07/27/20 0842   BP: 126/72   BP Location: Right arm   Patient Position: Sitting   BP Method: Medium (Manual)   Pulse: 89   SpO2: 97%   Weight: 62.5 kg (137 lb 12.6 oz)   Height: 4' 11.06" (1.5 m)     Body mass index is 27.78 kg/m².  Physical Exam  Vitals signs and nursing note reviewed.   Constitutional:       Appearance: She is well-developed.   HENT:      Head: Normocephalic.   Cardiovascular:      Rate and Rhythm: Normal rate and regular rhythm.      Heart sounds: Normal heart sounds. No murmur.   Pulmonary:      Effort: Pulmonary effort is normal. No respiratory distress.      Comments: Inspiratory rales noted to left upper lobe. She is asymptomatic. Reports she is at her respiratory baseline. Noted on last year's AWV.  Abdominal:      Palpations: Abdomen is soft. There is no mass.      Tenderness: There is no abdominal tenderness.   Musculoskeletal: Normal range of motion.   Skin:     General: Skin is warm and dry.   Neurological:      Mental Status: She is alert and oriented to person, place, and time.      Motor: No abnormal muscle tone.   Psychiatric:         Speech: Speech normal.         Behavior: Behavior normal.               Diagnoses and health risks identified today and associated recommendations/orders:    1. Encounter for preventive health examination  Patient will receive " Tetanus vaccine at pharmacy.      Encouraged healthy diet and exercise as tolerated.    2. Essential hypertension  Stable and controlled. Continue current treatment plan as previously prescribed with your PCP.     3. Hyperlipidemia, unspecified hyperlipidemia type  Not at goal. Continue current treatment plan as previously prescribed with your PCP.     4. Statin intolerance  See #3    5. Arteriosclerosis of aorta  Ct 4/2012  Continue current treatment plan as previously prescribed with your PCP.     6. Osteopenia, unspecified location  DEXA 2/2019  Continue current treatment plan as previously prescribed with your PCP.     7. Potential for cognitive impairment  Abnormal cognitive function screening.   Denies problems. Completes ADLs independently.   Advised to follow up with PCP for further evaluation and recommendations. She expressed understanding.      8. History of skin cancer  Continue current treatment plan as previously prescribed with your outside dermatologist.     9. Abnormal hearing screen  Discussed audiology referral, denies at this time.   Advised to follow up with PCP for further evaluation and recommendations. She expressed understanding.        Provided Isamar with a 5-10 year written screening schedule and personal prevention plan. Recommendations were developed using the USPSTF age appropriate recommendations. Education, counseling, and referrals were provided as needed. After Visit Summary printed and given to patient which includes a list of additional screenings\tests needed.    Follow up in about 1 year (around 7/27/2021) for AWV.    Melisa Rosenberg NP  I offered to discuss end of life issues, including information on how to make advance directives that the patient could use to name someone who would make medical decisions on their behalf if they became too ill to make themselves.    ___Patient declined  _X_Patient is interested, I provided paper work and offered to discuss.

## 2020-09-23 ENCOUNTER — OFFICE VISIT (OUTPATIENT)
Dept: FAMILY MEDICINE | Facility: CLINIC | Age: 85
End: 2020-09-23
Payer: MEDICARE

## 2020-09-23 ENCOUNTER — TELEPHONE (OUTPATIENT)
Dept: FAMILY MEDICINE | Facility: CLINIC | Age: 85
End: 2020-09-23

## 2020-09-23 VITALS
HEART RATE: 74 BPM | DIASTOLIC BLOOD PRESSURE: 67 MMHG | TEMPERATURE: 98 F | HEIGHT: 58 IN | BODY MASS INDEX: 28.76 KG/M2 | SYSTOLIC BLOOD PRESSURE: 139 MMHG | WEIGHT: 137 LBS

## 2020-09-23 DIAGNOSIS — M81.0 AGE-RELATED OSTEOPOROSIS WITHOUT CURRENT PATHOLOGICAL FRACTURE: Chronic | ICD-10-CM

## 2020-09-23 DIAGNOSIS — M54.16 LUMBAR RADICULOPATHY: ICD-10-CM

## 2020-09-23 DIAGNOSIS — E78.2 MIXED HYPERLIPIDEMIA: Primary | ICD-10-CM

## 2020-09-23 DIAGNOSIS — I70.0 ARTERIOSCLEROSIS OF AORTA: ICD-10-CM

## 2020-09-23 DIAGNOSIS — I10 ESSENTIAL HYPERTENSION: Chronic | ICD-10-CM

## 2020-09-23 DIAGNOSIS — E78.2 MIXED HYPERLIPIDEMIA: Chronic | ICD-10-CM

## 2020-09-23 DIAGNOSIS — Z85.828 HISTORY OF SKIN CANCER: ICD-10-CM

## 2020-09-23 PROCEDURE — 3078F DIAST BP <80 MM HG: CPT | Mod: HCNC,CPTII,S$GLB, | Performed by: INTERNAL MEDICINE

## 2020-09-23 PROCEDURE — 99214 OFFICE O/P EST MOD 30 MIN: CPT | Mod: HCNC,S$GLB,, | Performed by: INTERNAL MEDICINE

## 2020-09-23 PROCEDURE — 1101F PT FALLS ASSESS-DOCD LE1/YR: CPT | Mod: HCNC,CPTII,S$GLB, | Performed by: INTERNAL MEDICINE

## 2020-09-23 PROCEDURE — 3075F PR MOST RECENT SYSTOLIC BLOOD PRESS GE 130-139MM HG: ICD-10-PCS | Mod: HCNC,CPTII,S$GLB, | Performed by: INTERNAL MEDICINE

## 2020-09-23 PROCEDURE — 1159F MED LIST DOCD IN RCRD: CPT | Mod: HCNC,S$GLB,, | Performed by: INTERNAL MEDICINE

## 2020-09-23 PROCEDURE — 1159F PR MEDICATION LIST DOCUMENTED IN MEDICAL RECORD: ICD-10-PCS | Mod: HCNC,S$GLB,, | Performed by: INTERNAL MEDICINE

## 2020-09-23 PROCEDURE — 1101F PR PT FALLS ASSESS DOC 0-1 FALLS W/OUT INJ PAST YR: ICD-10-PCS | Mod: HCNC,CPTII,S$GLB, | Performed by: INTERNAL MEDICINE

## 2020-09-23 PROCEDURE — 99999 PR PBB SHADOW E&M-EST. PATIENT-LVL III: ICD-10-PCS | Mod: PBBFAC,HCNC,, | Performed by: INTERNAL MEDICINE

## 2020-09-23 PROCEDURE — 1126F PR PAIN SEVERITY QUANTIFIED, NO PAIN PRESENT: ICD-10-PCS | Mod: HCNC,S$GLB,, | Performed by: INTERNAL MEDICINE

## 2020-09-23 PROCEDURE — 3078F PR MOST RECENT DIASTOLIC BLOOD PRESSURE < 80 MM HG: ICD-10-PCS | Mod: HCNC,CPTII,S$GLB, | Performed by: INTERNAL MEDICINE

## 2020-09-23 PROCEDURE — 3075F SYST BP GE 130 - 139MM HG: CPT | Mod: HCNC,CPTII,S$GLB, | Performed by: INTERNAL MEDICINE

## 2020-09-23 PROCEDURE — 99999 PR PBB SHADOW E&M-EST. PATIENT-LVL III: CPT | Mod: PBBFAC,HCNC,, | Performed by: INTERNAL MEDICINE

## 2020-09-23 PROCEDURE — 1126F AMNT PAIN NOTED NONE PRSNT: CPT | Mod: HCNC,S$GLB,, | Performed by: INTERNAL MEDICINE

## 2020-09-23 PROCEDURE — 99214 PR OFFICE/OUTPT VISIT, EST, LEVL IV, 30-39 MIN: ICD-10-PCS | Mod: HCNC,S$GLB,, | Performed by: INTERNAL MEDICINE

## 2020-09-23 NOTE — ASSESSMENT & PLAN NOTE
Lab Results   Component Value Date    LDLCALC 168.2 (H) 08/30/2019   -statin intolerant  -working on dietary changes and take daily fish oil

## 2020-09-23 NOTE — ASSESSMENT & PLAN NOTE
-repeat DEXA 2019 with improvement to osteopenia  -remains on vitamin D; cannot tolerate calcium   -continue weight bearing exercises  -plan to repeat DEXA in 2021

## 2020-09-23 NOTE — TELEPHONE ENCOUNTER
I have signed for the following orders AND/OR meds.  Please call the patient and ask the patient to schedule the testing AND/OR inform about any medications that were sent. Medications have been sent to pharmacy listed below      Orders Placed This Encounter   Procedures    Comprehensive metabolic panel     Standing Status:   Future     Standing Expiration Date:   9/23/2021    Lipid Panel     Standing Status:   Future     Standing Expiration Date:   11/22/2021              [facility] - [place], [place] - [address]  [address] [address]  Phone: [phone] Fax: [phone]    [facility] [address] [facility] [address]  [id number] [place] [id number]  Phone: [phone] Fax: [phone]

## 2020-09-23 NOTE — PROGRESS NOTES
Assessment/Plan:    Essential hypertension  -at goal today  -currently on HCTZ 12.5 mg QD, verapamil 120 mg BID  -Did not tolerate ACE, Norvasc, Chlorothiadone in the past, per last PCP notes  -continue lifestyle modification with low sodium diet and exercise   -discussed hypertension disease course and importance of treating high blood pressure  -patient understood and advised of risk of untreated blood pressure.  ER precautions were given   for symptoms of hypertensive urgency and emergency.    Mixed hyperlipidemia  Lab Results   Component Value Date    LDLCALC 168.2 (H) 08/30/2019   -statin intolerant  -working on dietary changes and take daily fish oil    Osteoporosis  -repeat DEXA 2019 with improvement to osteopenia  -remains on vitamin D; cannot tolerate calcium   -continue weight bearing exercises  -plan to repeat DEXA in 2021    History of skin cancer  -followed closely by Dr. Noriega with NO dermatology    Lumbar radiculopathy  -followed by NO pain management, Dr. Thacker  -s/p JULIETTE in Sept 2020 and one in 2019  -plan for two more injections soon    ______________________________________________________________________________________________________________________________    Orders this visit:    Essential hypertension  -     Comprehensive metabolic panel; Standing    Arteriosclerosis of aorta  -     Lipid Panel; Standing    Mixed hyperlipidemia  -     Lipid Panel; Standing    Age-related osteoporosis without current pathological fracture    History of skin cancer    Lumbar radiculopathy      Follow up in about 6 months (around 3/23/2021).    Alina Roe MD  ______________________________________________________________________________________________________________________________    HPI:    Patient is a new patient to me here to establish care.  Patient is an 85-year-old white female with a past medical history of hypertension, hyperlipidemia, degenerative disc disease, history of skin cancer,  occasional GERD, and osteopenia.    Previous PCP: Dr. Marina Garibay    HTN: The patient is currently being treated for essential hypertension. This condition is chronic and stable. The patient is tolerating their medication well with good compliance.  Denies any adverse effects of medications.  Counseling was offered regarding low sodium diet.  The patient has a reduced salt intake. Routine exercise recommended. The patient denies headache, vision changes, chest pain, palpitations, shortness of breath, or lower extremity edema.    DDD:  Followed by pain management [facility].  History of spinal surgery in the past.  Also has received spinal injections in the past, last was only 1 week ago.  Plan for 2 more in the near future.  Continues have lower back pain with occasional right-sided radiculopathy.  Denies any lower extremity weakness or numbness/tingling.  Denies any bowel or bladder incontinence.  Patient has follow-up with pain management next week.    GERD:  Has been treated in the past with p.r.n. OTC PPI.  Denies any recent symptoms of GERD.  Denies any history of alarm symptoms, such as dysphagia, nausea/vomiting or weight loss.    Hx of skin cancer:  History of nonmelanotic skin cancer.  Continues to be followed very closely by [facility].  Has an appointment with dermatology within the next several weeks.    No new complaints today.  Routine health maintenance reviewed.  Labs ordered.  Patient declines flu shot today, will receive at pharmacy.    Past Medical History:  Past Medical History:   Diagnosis Date    Arthritis     hands    Benign hematuria     shingleton    Calcific tendinitis of left shoulder 1/27/2016    Cataract     Diverticulosis     colonoscopy 5/22/2012    Duodenal diverticulum     EGD 5/22/2012    GERD (gastroesophageal reflux disease)     Hemorrhoids     colonoscopy 5/22/2012    History of skin cancer     per patient s/p excision, no chemo or radiation, sees outside  Dr. Alvina zhong.    Hyperlipidemia     Hypertension     Impingement syndrome of left shoulder 1/27/2016    Osteoporosis     declines tx;11/17/16 phone note    Pneumonia     as a child    Sciatica     santhosh; dr castillo    Skin cancer     Dr. Noriega    Vitamin D deficiency disease      Past Surgical History:   Procedure Laterality Date    APPENDECTOMY      CARPAL TUNNEL RELEASE Bilateral     CATARACT EXTRACTION W/  INTRAOCULAR LENS IMPLANT Left 05/01/2019    CATARACT EXTRACTION W/  INTRAOCULAR LENS IMPLANT Right 05/15/2019    CHOLECYSTECTOMY      HYSTERECTOMY      joint removal in right thumb      SKIN CANCER EXCISION      SPINE SURGERY  9/16/14    L4/5 laminectomy;dr sagastume    TOE SURGERY      LT FIFTH     Review of patient's allergies indicates:   Allergen Reactions    Meloxicam Swelling    Ace inhibitors      Other reaction(s): cough    Amlodipine      Leg swelling    Chlorthalidone      Other reaction(s): ? dizzy  Other reaction(s): ? dizzy    Codeine Nausea And Vomiting    Demerol (pf)  [meperidine (pf)]      Other reaction(s): Itching    Fosamax  [alendronate]      Other reaction(s): aches    Fosamax plus d  [alendronate-vitamin d3]      Other reaction(s): aches    Hydrochlorothiazide (bulk)      Other reaction(s): bad taste    Hydrocodone-acetaminophen      Other reaction(s): Stomach upset  Other reaction(s): Nausea    Ibandronate      Other reaction(s): aches  Other reaction(s): aches    Ibandronate sodium      Other reaction(s): Unknown    Losartan Diarrhea    Meperidine Itching    Opioids - morphine analogues     Opium     Statins-hmg-coa reductase inhibitors      Few statins intol    Toprol xl  [metoprolol succinate]      Other reaction(s): diarrhea  Other reaction(s): diarrhea    Tramadol     Trazodone Other (See Comments)     Dry mouth     Social History     Tobacco Use    Smoking status: Never Smoker    Smokeless tobacco: Never Used   Substance Use Topics    Alcohol  use: No     Frequency: Never     Binge frequency: Never    Drug use: No     Family History   Problem Relation Age of Onset    Heart disease Mother     Hypertension Mother     Cataracts Mother     Heart disease Father     Hypertension Father     Cataracts Father     Heart disease Brother 45    Heart disease Sister         A-fib    Heart disease Sister         heart bypass x 5    Kidney disease Brother 70    Heart disease Brother     Cancer Maternal Aunt         breast    Diabetes Maternal Grandmother     Diabetes Maternal Aunt     Hypertension Other         multiple family members    Cancer Other         multiple with skin cancer    Stroke Neg Hx     Melanoma Neg Hx     Hyperlipidemia Neg Hx      Current Outpatient Medications on File Prior to Visit   Medication Sig Dispense Refill    ergocalciferol, vitamin D2, (VITAMIN D ORAL) Take 1,000 Units by mouth once daily.      hydroCHLOROthiazide (MICROZIDE) 12.5 mg capsule TAKE 1 CAPSULE (12.5 MG TOTAL) BY MOUTH ONCE DAILY.  90 capsule 1    omega-3/dha/epa/fish oil (OMEGA-3 FISH OIL ORAL) Take by mouth.      turmeric root extract 500 mg Cap Take 500 mg by mouth.      verapamil (CALAN) 120 MG tablet Take 1 tablet (120 mg total) by mouth 2 (two) times daily. 180 tablet 3    vitamin E 400 UNIT capsule Take 400 Units by mouth once daily.      [DISCONTINUED] calcium-vitamin D 250-100 mg-unit per tablet Take 1 tablet by mouth.      [DISCONTINUED] clotrimazole (LOTRIMIN) 1 % cream Apply topically 2 (two) times daily. (Patient not taking: Reported on 2/3/2020) 45 g 0    [DISCONTINUED] nystatin (MYCOSTATIN) powder Apply topically 4 (four) times daily. (Patient not taking: Reported on 2/3/2020) 60 g 1    [DISCONTINUED] omega 3-dha-epa-fish oil 900-1,400 mg CpDR Take 1,600 mg by mouth.      [DISCONTINUED] traZODone (DESYREL) 50 MG tablet Take 1 tablet (50 mg total) by mouth every evening. (Patient not taking: Reported on 7/27/2020) 90 tablet 3     No  "current facility-administered medications on file prior to visit.        Review of Systems   Constitutional: Negative for chills, diaphoresis, fatigue and fever.   HENT: Negative for congestion, ear pain, postnasal drip, sinus pain and sore throat.    Eyes: Negative for pain and redness.   Respiratory: Negative for cough, chest tightness and shortness of breath.    Cardiovascular: Negative for chest pain and leg swelling.   Gastrointestinal: Negative for abdominal pain, constipation, diarrhea, nausea and vomiting.   Genitourinary: Negative for dysuria and hematuria.   Musculoskeletal: Positive for back pain. Negative for arthralgias and joint swelling.   Skin: Negative for rash.   Neurological: Negative for dizziness, syncope and headaches.       Vitals:    09/23/20 0745   BP: 139/67   Pulse: 74   Temp: 97.9 °F (36.6 °C)   Weight: 62.1 kg (137 lb)   Height: 4' 10" (1.473 m)       Wt Readings from Last 3 Encounters:   09/23/20 62.1 kg (137 lb)   07/27/20 62.5 kg (137 lb 12.6 oz)   02/03/20 60.7 kg (133 lb 13.1 oz)       Physical Exam  Constitutional:       General: She is not in acute distress.     Appearance: Normal appearance. She is well-developed.   HENT:      Head: Normocephalic and atraumatic.   Eyes:      Conjunctiva/sclera: Conjunctivae normal.   Neck:      Musculoskeletal: Normal range of motion and neck supple.   Cardiovascular:      Rate and Rhythm: Normal rate and regular rhythm.      Pulses: Normal pulses.      Heart sounds: Normal heart sounds. No murmur.   Pulmonary:      Effort: Pulmonary effort is normal. No respiratory distress.      Breath sounds: Normal breath sounds.   Abdominal:      General: Bowel sounds are normal. There is no distension.      Palpations: Abdomen is soft.      Tenderness: There is no abdominal tenderness.   Musculoskeletal: Normal range of motion.   Skin:     General: Skin is warm and dry.      Findings: No rash.   Neurological:      General: No focal deficit present.      " Mental Status: She is alert and oriented to person, place, and time.         Health Maintenance   Topic Date Due    TETANUS VACCINE  06/25/2018    Lipid Panel  08/30/2020    DEXA SCAN  02/04/2021    Pneumococcal Vaccine (65+ Low/Medium Risk)  Completed

## 2020-09-24 ENCOUNTER — LAB VISIT (OUTPATIENT)
Dept: LAB | Facility: HOSPITAL | Age: 85
End: 2020-09-24
Attending: INTERNAL MEDICINE
Payer: MEDICARE

## 2020-09-24 DIAGNOSIS — E78.2 MIXED HYPERLIPIDEMIA: Chronic | ICD-10-CM

## 2020-09-24 DIAGNOSIS — I10 ESSENTIAL HYPERTENSION: Chronic | ICD-10-CM

## 2020-09-24 DIAGNOSIS — I70.0 ARTERIOSCLEROSIS OF AORTA: ICD-10-CM

## 2020-09-24 LAB
ALBUMIN SERPL BCP-MCNC: 3.9 G/DL (ref 3.5–5.2)
ALBUMIN SERPL BCP-MCNC: 3.9 G/DL (ref 3.5–5.2)
ALP SERPL-CCNC: 113 U/L (ref 55–135)
ALP SERPL-CCNC: 113 U/L (ref 55–135)
ALT SERPL W/O P-5'-P-CCNC: 16 U/L (ref 10–44)
ALT SERPL W/O P-5'-P-CCNC: 16 U/L (ref 10–44)
ANION GAP SERPL CALC-SCNC: 9 MMOL/L (ref 8–16)
ANION GAP SERPL CALC-SCNC: 9 MMOL/L (ref 8–16)
AST SERPL-CCNC: 15 U/L (ref 10–40)
AST SERPL-CCNC: 15 U/L (ref 10–40)
BILIRUB SERPL-MCNC: 0.7 MG/DL (ref 0.1–1)
BILIRUB SERPL-MCNC: 0.7 MG/DL (ref 0.1–1)
BUN SERPL-MCNC: 17 MG/DL (ref 8–23)
BUN SERPL-MCNC: 17 MG/DL (ref 8–23)
CALCIUM SERPL-MCNC: 9.6 MG/DL (ref 8.7–10.5)
CALCIUM SERPL-MCNC: 9.6 MG/DL (ref 8.7–10.5)
CHLORIDE SERPL-SCNC: 104 MMOL/L (ref 95–110)
CHLORIDE SERPL-SCNC: 104 MMOL/L (ref 95–110)
CHOLEST SERPL-MCNC: 262 MG/DL (ref 120–199)
CHOLEST SERPL-MCNC: 262 MG/DL (ref 120–199)
CHOLEST/HDLC SERPL: 4.5 {RATIO} (ref 2–5)
CHOLEST/HDLC SERPL: 4.5 {RATIO} (ref 2–5)
CO2 SERPL-SCNC: 30 MMOL/L (ref 23–29)
CO2 SERPL-SCNC: 30 MMOL/L (ref 23–29)
CREAT SERPL-MCNC: 0.7 MG/DL (ref 0.5–1.4)
CREAT SERPL-MCNC: 0.7 MG/DL (ref 0.5–1.4)
EST. GFR  (AFRICAN AMERICAN): >60 ML/MIN/1.73 M^2
EST. GFR  (AFRICAN AMERICAN): >60 ML/MIN/1.73 M^2
EST. GFR  (NON AFRICAN AMERICAN): >60 ML/MIN/1.73 M^2
EST. GFR  (NON AFRICAN AMERICAN): >60 ML/MIN/1.73 M^2
GLUCOSE SERPL-MCNC: 78 MG/DL (ref 70–110)
GLUCOSE SERPL-MCNC: 78 MG/DL (ref 70–110)
HDLC SERPL-MCNC: 58 MG/DL (ref 40–75)
HDLC SERPL-MCNC: 58 MG/DL (ref 40–75)
HDLC SERPL: 22.1 % (ref 20–50)
HDLC SERPL: 22.1 % (ref 20–50)
LDLC SERPL CALC-MCNC: 166.6 MG/DL (ref 63–159)
LDLC SERPL CALC-MCNC: 166.6 MG/DL (ref 63–159)
NONHDLC SERPL-MCNC: 204 MG/DL
NONHDLC SERPL-MCNC: 204 MG/DL
POTASSIUM SERPL-SCNC: 4.2 MMOL/L (ref 3.5–5.1)
POTASSIUM SERPL-SCNC: 4.2 MMOL/L (ref 3.5–5.1)
PROT SERPL-MCNC: 6.9 G/DL (ref 6–8.4)
PROT SERPL-MCNC: 6.9 G/DL (ref 6–8.4)
SODIUM SERPL-SCNC: 143 MMOL/L (ref 136–145)
SODIUM SERPL-SCNC: 143 MMOL/L (ref 136–145)
TRIGL SERPL-MCNC: 187 MG/DL (ref 30–150)
TRIGL SERPL-MCNC: 187 MG/DL (ref 30–150)

## 2020-09-24 PROCEDURE — 36415 COLL VENOUS BLD VENIPUNCTURE: CPT | Mod: HCNC,PO

## 2020-09-24 PROCEDURE — 80061 LIPID PANEL: CPT | Mod: HCNC

## 2020-09-24 PROCEDURE — 80053 COMPREHEN METABOLIC PANEL: CPT | Mod: HCNC

## 2020-09-25 NOTE — PROGRESS NOTES
Results have been released via [employer]. Please verify that these have been viewed by patient. If not, please call patient with results.    I have sent a message to them with the following interpretation (see below).    I have reviewed your recent blood work.     Your metabolic panel which shows your electrolytes, glucose, kidney and liver function is within normal limits.    Your cholesterol is still elevated but it is stable.

## 2020-12-15 ENCOUNTER — OFFICE VISIT (OUTPATIENT)
Dept: FAMILY MEDICINE | Facility: CLINIC | Age: 85
End: 2020-12-15
Payer: MEDICARE

## 2020-12-15 VITALS
HEIGHT: 58 IN | DIASTOLIC BLOOD PRESSURE: 80 MMHG | SYSTOLIC BLOOD PRESSURE: 138 MMHG | HEART RATE: 80 BPM | WEIGHT: 136.63 LBS | TEMPERATURE: 99 F | BODY MASS INDEX: 28.68 KG/M2

## 2020-12-15 DIAGNOSIS — R19.7 DIARRHEA, UNSPECIFIED TYPE: ICD-10-CM

## 2020-12-15 DIAGNOSIS — R19.7 DIARRHEA: ICD-10-CM

## 2020-12-15 DIAGNOSIS — R11.0 NAUSEA: Primary | ICD-10-CM

## 2020-12-15 LAB
INFLUENZA A, MOLECULAR: NEGATIVE
INFLUENZA B, MOLECULAR: NEGATIVE
SPECIMEN SOURCE: NORMAL

## 2020-12-15 PROCEDURE — 99999 PR PBB SHADOW E&M-EST. PATIENT-LVL IV: ICD-10-PCS | Mod: PBBFAC,HCNC,, | Performed by: NURSE PRACTITIONER

## 2020-12-15 PROCEDURE — 3288F FALL RISK ASSESSMENT DOCD: CPT | Mod: HCNC,CPTII,S$GLB, | Performed by: NURSE PRACTITIONER

## 2020-12-15 PROCEDURE — 3079F PR MOST RECENT DIASTOLIC BLOOD PRESSURE 80-89 MM HG: ICD-10-PCS | Mod: HCNC,CPTII,S$GLB, | Performed by: NURSE PRACTITIONER

## 2020-12-15 PROCEDURE — U0003 INFECTIOUS AGENT DETECTION BY NUCLEIC ACID (DNA OR RNA); SEVERE ACUTE RESPIRATORY SYNDROME CORONAVIRUS 2 (SARS-COV-2) (CORONAVIRUS DISEASE [COVID-19]), AMPLIFIED PROBE TECHNIQUE, MAKING USE OF HIGH THROUGHPUT TECHNOLOGIES AS DESCRIBED BY CMS-2020-01-R: HCPCS | Mod: HCNC

## 2020-12-15 PROCEDURE — 3079F DIAST BP 80-89 MM HG: CPT | Mod: HCNC,CPTII,S$GLB, | Performed by: NURSE PRACTITIONER

## 2020-12-15 PROCEDURE — 99213 PR OFFICE/OUTPT VISIT, EST, LEVL III, 20-29 MIN: ICD-10-PCS | Mod: HCNC,S$GLB,, | Performed by: NURSE PRACTITIONER

## 2020-12-15 PROCEDURE — 1159F PR MEDICATION LIST DOCUMENTED IN MEDICAL RECORD: ICD-10-PCS | Mod: HCNC,S$GLB,, | Performed by: NURSE PRACTITIONER

## 2020-12-15 PROCEDURE — 3075F SYST BP GE 130 - 139MM HG: CPT | Mod: HCNC,CPTII,S$GLB, | Performed by: NURSE PRACTITIONER

## 2020-12-15 PROCEDURE — 1101F PR PT FALLS ASSESS DOC 0-1 FALLS W/OUT INJ PAST YR: ICD-10-PCS | Mod: HCNC,CPTII,S$GLB, | Performed by: NURSE PRACTITIONER

## 2020-12-15 PROCEDURE — 99999 PR PBB SHADOW E&M-EST. PATIENT-LVL IV: CPT | Mod: PBBFAC,HCNC,, | Performed by: NURSE PRACTITIONER

## 2020-12-15 PROCEDURE — 1101F PT FALLS ASSESS-DOCD LE1/YR: CPT | Mod: HCNC,CPTII,S$GLB, | Performed by: NURSE PRACTITIONER

## 2020-12-15 PROCEDURE — 3288F PR FALLS RISK ASSESSMENT DOCUMENTED: ICD-10-PCS | Mod: HCNC,CPTII,S$GLB, | Performed by: NURSE PRACTITIONER

## 2020-12-15 PROCEDURE — 99213 OFFICE O/P EST LOW 20 MIN: CPT | Mod: HCNC,S$GLB,, | Performed by: NURSE PRACTITIONER

## 2020-12-15 PROCEDURE — 87502 INFLUENZA DNA AMP PROBE: CPT | Mod: HCNC,PO

## 2020-12-15 PROCEDURE — 1126F PR PAIN SEVERITY QUANTIFIED, NO PAIN PRESENT: ICD-10-PCS | Mod: HCNC,S$GLB,, | Performed by: NURSE PRACTITIONER

## 2020-12-15 PROCEDURE — 1126F AMNT PAIN NOTED NONE PRSNT: CPT | Mod: HCNC,S$GLB,, | Performed by: NURSE PRACTITIONER

## 2020-12-15 PROCEDURE — 3075F PR MOST RECENT SYSTOLIC BLOOD PRESS GE 130-139MM HG: ICD-10-PCS | Mod: HCNC,CPTII,S$GLB, | Performed by: NURSE PRACTITIONER

## 2020-12-15 PROCEDURE — 1159F MED LIST DOCD IN RCRD: CPT | Mod: HCNC,S$GLB,, | Performed by: NURSE PRACTITIONER

## 2020-12-15 RX ORDER — ONDANSETRON HYDROCHLORIDE 8 MG/1
8 TABLET, FILM COATED ORAL EVERY 8 HOURS PRN
Qty: 15 TABLET | Refills: 0 | Status: SHIPPED | OUTPATIENT
Start: 2020-12-15 | End: 2020-12-20

## 2020-12-15 NOTE — PROGRESS NOTES
Subjective:       Patient ID: Isamar Roe is a 85 y.o. female.    Chief Complaint: Diarrhea (since , cleared up yesterday) and Nausea (since saturday)    Nausea  This is a new (Since Saturday; states began after attending ) problem. The current episode started in the past 7 days. The problem occurs daily. The problem has been gradually improving. Associated symptoms include fatigue and nausea. Pertinent negatives include no abdominal pain, anorexia, arthralgias, change in bowel habit, chest pain, chills, congestion, coughing, diaphoresis, fever, headaches, joint swelling, myalgias, neck pain, numbness, rash, sore throat, swollen glands, urinary symptoms, vertigo, visual change, vomiting or weakness. Associated symptoms comments: Diarrhea since Saturday; states still soft but not watery as it had been. She has tried nothing for the symptoms. The treatment provided mild relief.     Past Medical History:   Diagnosis Date    Arthritis     hands    Benign hematuria     [facility]    Calcific tendinitis of left shoulder 2016    Cataract     Diverticulosis     colonoscopy 2012    Duodenal diverticulum     EGD 2012    GERD (gastroesophageal reflux disease)     Hemorrhoids     colonoscopy 2012    History of skin cancer     per patient s/p excision, no chemo or radiation, sees outside derm, Dr. Tinsley.    Hyperlipidemia     Hypertension     Impingement syndrome of left shoulder 2016    Osteoporosis     declines tx;16 phone note    Pneumonia     as a child    Sciatica     santhosh; dr castillo    Skin cancer     Dr. Noriega    Vitamin D deficiency disease      Social History     Socioeconomic History    Marital status:      Spouse name: AMBREEN    Number of children: 4    Years of education: Not on file    Highest education level: Not on file   Occupational History    Not on file   Social Needs    Financial resource strain: Not hard at all    Food insecurity      Worry: Never true     Inability: Never true    Transportation needs     Medical: No     Non-medical: No   Tobacco Use    Smoking status: Never Smoker    Smokeless tobacco: Never Used   Substance and Sexual Activity    Alcohol use: No     Frequency: Never     Binge frequency: Never    Drug use: No    Sexual activity: Yes     Partners: Male     Birth control/protection: See Surgical Hx   Lifestyle    Physical activity     Days per week: 0 days     Minutes per session: Not on file    Stress: To some extent   Relationships    Social connections     Talks on phone: More than three times a week     Gets together: Not on file     Attends [employer] service: Not on file     Active member of club or organization: Yes     Attends meetings of clubs or organizations: Not on file     Relationship status:    Other Topics Concern    Are you pregnant or think you may be? No    Breast-feeding No   Social History Narrative    Wears a seatbelt.     Past Surgical History:   Procedure Laterality Date    APPENDECTOMY      CARPAL TUNNEL RELEASE Bilateral     CATARACT EXTRACTION W/  INTRAOCULAR LENS IMPLANT Left 05/01/2019    CATARACT EXTRACTION W/  INTRAOCULAR LENS IMPLANT Right 05/15/2019    CHOLECYSTECTOMY      HYSTERECTOMY      joint removal in right thumb      SKIN CANCER EXCISION      SPINE SURGERY  9/16/14    L4/5 laminectomy;dr sagastume    TOE SURGERY      LT FIFTH       Review of Systems   Constitutional: Positive for fatigue. Negative for chills, diaphoresis and fever.   HENT: Negative.  Negative for nasal congestion and sore throat.    Eyes: Negative.    Respiratory: Negative.  Negative for cough.    Cardiovascular: Negative.  Negative for chest pain.   Gastrointestinal: Positive for diarrhea and nausea. Negative for abdominal pain, anorexia, change in bowel habit, vomiting and change in bowel habit.   Endocrine: Negative.    Genitourinary: Negative.    Musculoskeletal: Negative.  Negative for arthralgias,  joint swelling, myalgias and neck pain.   Integumentary:  Negative for rash. Negative.   Allergic/Immunologic: Negative.    Neurological: Negative.  Negative for vertigo, weakness, numbness and headaches.   Psychiatric/Behavioral: Negative.          Objective:      Physical Exam  Vitals signs and nursing note reviewed.   Constitutional:       Appearance: Normal appearance.   HENT:      Head: Normocephalic.      Right Ear: Tympanic membrane, ear canal and external ear normal.      Left Ear: Tympanic membrane, ear canal and external ear normal.      Nose: Nose normal.      Mouth/Throat:      Mouth: Mucous membranes are moist.      Pharynx: Oropharynx is clear.   Eyes:      Conjunctiva/sclera: Conjunctivae normal.      Pupils: Pupils are equal, round, and reactive to light.   Neck:      Musculoskeletal: Normal range of motion and neck supple.   Cardiovascular:      Rate and Rhythm: Normal rate and regular rhythm.      Pulses: Normal pulses.      Heart sounds: Normal heart sounds.   Pulmonary:      Effort: Pulmonary effort is normal.      Breath sounds: Normal breath sounds.   Abdominal:      General: Bowel sounds are normal.      Palpations: Abdomen is soft.      Tenderness: There is no abdominal tenderness.   Musculoskeletal: Normal range of motion.   Skin:     General: Skin is warm and dry.      Capillary Refill: Capillary refill takes 2 to 3 seconds.   Neurological:      Mental Status: She is alert and oriented to person, place, and time.   Psychiatric:         Mood and Affect: Mood normal.         Behavior: Behavior normal.         Thought Content: Thought content normal.         Judgment: Judgment normal.         Assessment:       1. Nausea    2. Diarrhea, unspecified type    3. Diarrhea        Plan:           Isamar was seen today for diarrhea and nausea.    Diagnoses and all orders for this visit:    Nausea  Diarrhea, unspecified type  Diarrhea  -     Stool Exam-Ova,Cysts,Parasites; Future  -     CULTURE, STOOL;  Future  -     Giardia / Cryptosporidum, EIA; Future  -     WBC, Stool; Future  -     Occult blood x 1, stool; Future  -     Influenza A & B by Molecular  -     COVID-19 Routine Screening  -     ondansetron (ZOFRAN) 8 MG tablet; Take 1 tablet (8 mg total) by mouth every 8 (eight) hours as needed.  COVID-19 home instructions given  Hydrate well  Rest  Imodium OTC as directed as needed  Report to ER immediately if symptoms worsen

## 2020-12-15 NOTE — PATIENT INSTRUCTIONS
Hydrate well  Rest  Imodium OTC as directed as needed  Report to ER immediately if symptoms worsen    Instructions for Patients with Confirmed or Suspected COVID-19    If you are awaiting your test result, you will either be called or it will be released to the patient portal.  If you have any questions about your test, please visit [email or web address] or call our COVID-19 information line at [phone].      Instructions for non-hospitalized or discharged patients with confirmed or suspected COVID-19:       Stay home except to get medical care.    Separate yourself from other people and animals in your home.    Call ahead before visiting your doctor.    Wear a face mask.    Cover your coughs and sneezes.    Clean your hands often.    Avoid sharing personal household items.    Clean all high-touch surfaces every day.    Monitor your symptoms. Seek prompt medical attention if your illness is worsening (e.g., difficulty breathing). Before seeking care, call your healthcare provider.    If you have a medical emergency and must call 911, notify the dispatcher that you have or are being evaluated for COVID-19. If possible, put on a face mask before emergency medical services arrive.    Use the following symptom-based strategy to return to normal activity following a suspected or confirmed case of COVID-19. Continue isolation until:   o At least 3 days (72 hours) have passed since recovery defined as resolution of fever without the use of fever-reducing medications and improvement in respiratory symptoms (e.g. cough, shortness of breath), and   o At least 10 days have passed since the first positive test.       As one of the next steps, you will receive a call or text from the [place] Department of Health ([facility]) COVID-19 Tracing Team. See the contact information below so you know not to ignore the health departments call. It is important that you contact them back immediately so they can help.      Contact Tracer Number:  [phone]  Caller ID for most carriers: [facility]    What is contact tracing?   Contact tracing is a process that helps identify everyone who has been in close contact with an infected person. Contact tracers let those people know they may have been exposed and guide them on next steps. Confidentiality is important for everyone; no one will be told who may have exposed them to the virus.   Your involvement is important. The more we know about where and how this virus is spreading, the better chance we have at stopping it from spreading further.  What does exposure mean?   Exposure means you have been within 6 feet for more than 15 minutes with a person who has or had COVID-19.  What kind of questions do the contact tracers ask?   A contact tracer will confirm your basic contact information including name, address, phone number, and next of kin, as well as asking about any symptoms you may have had. Theyll also ask you how you think you may have gotten sick, such as places where you may have been exposed to the virus, and people you were with. Those names will never be shared with anyone outside of that call, and will only be used to help trace and stop the spread of the virus.   I have privacy concerns. How will the state use my information?   Your privacy about your health is important. All calls are completed using call centers that use the appropriate health privacy protection measures (HIPAA compliance), meaning that your patient information is safe. No one will ever ask you any questions related to immigration status. Your health comes first.   Do I have to participate?   You do not have to participate, but we strongly encourage you to. Contact tracing can help us catch and control new outbreaks as theyre developing to keep your friends and family safe.   What if I dont hear from anyone?   If you dont receive a call within 24 hours, you can call the number above  right away to inquire about your status. That line is open from 8:00 am - 8:00 p.m., 7 days a week.  Contact tracing saves lives! Together, we have the power to beat this virus and keep our loved ones and neighbors safe.       Instructions for household members, intimate partners and caregivers in a non-healthcare setting of a patient with confirmed or suspected COVID-19:         Close contacts should monitor their health and call their healthcare provider right away if they develop symptoms suggestive of COVID-19 (e.g., fever, cough, shortness of breath).    Stay home except to get medical care. Separate yourself from other people and animals in the home.   Monitor the patients symptoms. If the patient is getting sicker, call his or her healthcare provider. If the patient has a medical emergency and you need to call 911, notify the dispatch personnel that the patient has or is being evaluated for COVID-19.    Wear a facemask when around other people such as sharing a room or vehicle and before entering a healthcare provider's office.   Cover coughs and sneezes with a tissue. Throw used tissues in a lined trash can immediately and wash hands.   Clean hands often with soap and water for at least 20 seconds or with an alcohol-based hand , rubbing hands together until they feel dry. Avoid touching your eyes, nose, and mouth with unwashed hands.   Clean all high-touch; surfaces every day, including counters, tabletops, doorknobs, bathroom fixtures, toilets, phones, keyboards, tablets, bedside tables, etc. Use a household cleaning spray or wipe according to label instructions.   Avoid sharing personal household items such as dishes, drinking glasses, cups, towels, bedding, etc. After these items are used, they should be washed thoroughly with soap and water.   Continue isolation until:   At least 3 days (72 hours) have passed since recovery defined as resolution of fever without the use of  fever-reducing medications and improvement in respiratory symptoms (e.g. cough, shortness of breath), and    At least 10 days have passed since the patients first positive test.    https://www.cdc.gov/coronavirus/2019-ncov/your-health/index.htm

## 2020-12-16 ENCOUNTER — LAB VISIT (OUTPATIENT)
Dept: LAB | Facility: HOSPITAL | Age: 85
End: 2020-12-16
Attending: NURSE PRACTITIONER
Payer: MEDICARE

## 2020-12-16 DIAGNOSIS — R19.7 DIARRHEA, UNSPECIFIED TYPE: ICD-10-CM

## 2020-12-16 DIAGNOSIS — U07.1 COVID-19 VIRUS DETECTED: ICD-10-CM

## 2020-12-16 LAB
SARS-COV-2 RNA RESP QL NAA+PROBE: DETECTED
WBC #/AREA STL HPF: NORMAL /[HPF]

## 2020-12-16 PROCEDURE — 87046 STOOL CULTR AEROBIC BACT EA: CPT | Mod: 59,HCNC

## 2020-12-16 PROCEDURE — 87045 FECES CULTURE AEROBIC BACT: CPT | Mod: HCNC

## 2020-12-16 PROCEDURE — 82272 OCCULT BLD FECES 1-3 TESTS: CPT | Mod: HCNC

## 2020-12-16 PROCEDURE — 89055 LEUKOCYTE ASSESSMENT FECAL: CPT | Mod: HCNC

## 2020-12-16 PROCEDURE — 87427 SHIGA-LIKE TOXIN AG IA: CPT | Mod: 59,HCNC

## 2020-12-16 PROCEDURE — 87209 SMEAR COMPLEX STAIN: CPT | Mod: HCNC

## 2020-12-16 PROCEDURE — 87329 GIARDIA AG IA: CPT | Mod: HCNC

## 2020-12-17 LAB
CRYPTOSP AG STL QL IA: NEGATIVE
E COLI SXT1 STL QL IA: NEGATIVE
E COLI SXT2 STL QL IA: NEGATIVE
G LAMBLIA AG STL QL IA: NEGATIVE
OB PNL STL: NEGATIVE

## 2020-12-21 LAB — BACTERIA STL CULT: NORMAL

## 2020-12-22 LAB — O+P STL MICRO: NORMAL

## 2021-01-04 ENCOUNTER — TELEPHONE (OUTPATIENT)
Dept: FAMILY MEDICINE | Facility: CLINIC | Age: 86
End: 2021-01-04

## 2021-01-06 ENCOUNTER — OFFICE VISIT (OUTPATIENT)
Dept: FAMILY MEDICINE | Facility: CLINIC | Age: 86
End: 2021-01-06
Payer: MEDICARE

## 2021-01-06 VITALS
BODY MASS INDEX: 28.63 KG/M2 | HEART RATE: 77 BPM | HEIGHT: 58 IN | WEIGHT: 136.38 LBS | SYSTOLIC BLOOD PRESSURE: 118 MMHG | DIASTOLIC BLOOD PRESSURE: 66 MMHG

## 2021-01-06 DIAGNOSIS — R05.8 POST-VIRAL COUGH SYNDROME: Primary | ICD-10-CM

## 2021-01-06 PROCEDURE — 3288F PR FALLS RISK ASSESSMENT DOCUMENTED: ICD-10-PCS | Mod: HCNC,CPTII,S$GLB, | Performed by: NURSE PRACTITIONER

## 2021-01-06 PROCEDURE — 99213 OFFICE O/P EST LOW 20 MIN: CPT | Mod: HCNC,S$GLB,, | Performed by: NURSE PRACTITIONER

## 2021-01-06 PROCEDURE — 99999 PR PBB SHADOW E&M-EST. PATIENT-LVL III: CPT | Mod: PBBFAC,HCNC,, | Performed by: NURSE PRACTITIONER

## 2021-01-06 PROCEDURE — 1159F MED LIST DOCD IN RCRD: CPT | Mod: HCNC,S$GLB,, | Performed by: NURSE PRACTITIONER

## 2021-01-06 PROCEDURE — 1101F PT FALLS ASSESS-DOCD LE1/YR: CPT | Mod: HCNC,CPTII,S$GLB, | Performed by: NURSE PRACTITIONER

## 2021-01-06 PROCEDURE — 1159F PR MEDICATION LIST DOCUMENTED IN MEDICAL RECORD: ICD-10-PCS | Mod: HCNC,S$GLB,, | Performed by: NURSE PRACTITIONER

## 2021-01-06 PROCEDURE — 1101F PR PT FALLS ASSESS DOC 0-1 FALLS W/OUT INJ PAST YR: ICD-10-PCS | Mod: HCNC,CPTII,S$GLB, | Performed by: NURSE PRACTITIONER

## 2021-01-06 PROCEDURE — 1126F PR PAIN SEVERITY QUANTIFIED, NO PAIN PRESENT: ICD-10-PCS | Mod: HCNC,S$GLB,, | Performed by: NURSE PRACTITIONER

## 2021-01-06 PROCEDURE — 99213 PR OFFICE/OUTPT VISIT, EST, LEVL III, 20-29 MIN: ICD-10-PCS | Mod: HCNC,S$GLB,, | Performed by: NURSE PRACTITIONER

## 2021-01-06 PROCEDURE — 3288F FALL RISK ASSESSMENT DOCD: CPT | Mod: HCNC,CPTII,S$GLB, | Performed by: NURSE PRACTITIONER

## 2021-01-06 PROCEDURE — 1126F AMNT PAIN NOTED NONE PRSNT: CPT | Mod: HCNC,S$GLB,, | Performed by: NURSE PRACTITIONER

## 2021-01-06 PROCEDURE — 3078F PR MOST RECENT DIASTOLIC BLOOD PRESSURE < 80 MM HG: ICD-10-PCS | Mod: HCNC,CPTII,S$GLB, | Performed by: NURSE PRACTITIONER

## 2021-01-06 PROCEDURE — 3078F DIAST BP <80 MM HG: CPT | Mod: HCNC,CPTII,S$GLB, | Performed by: NURSE PRACTITIONER

## 2021-01-06 PROCEDURE — 3074F PR MOST RECENT SYSTOLIC BLOOD PRESSURE < 130 MM HG: ICD-10-PCS | Mod: HCNC,CPTII,S$GLB, | Performed by: NURSE PRACTITIONER

## 2021-01-06 PROCEDURE — 3074F SYST BP LT 130 MM HG: CPT | Mod: HCNC,CPTII,S$GLB, | Performed by: NURSE PRACTITIONER

## 2021-01-06 PROCEDURE — 99999 PR PBB SHADOW E&M-EST. PATIENT-LVL III: ICD-10-PCS | Mod: PBBFAC,HCNC,, | Performed by: NURSE PRACTITIONER

## 2021-01-06 RX ORDER — ALBUTEROL SULFATE 90 UG/1
1 AEROSOL, METERED RESPIRATORY (INHALATION) EVERY 4 HOURS PRN
Qty: 18 G | Refills: 0 | Status: SHIPPED | OUTPATIENT
Start: 2021-01-06 | End: 2021-08-23

## 2021-01-15 RX ORDER — HYDROCHLOROTHIAZIDE 12.5 MG/1
CAPSULE ORAL
Qty: 90 CAPSULE | Refills: 1 | Status: SHIPPED | OUTPATIENT
Start: 2021-01-15 | End: 2021-07-08

## 2021-03-01 ENCOUNTER — PES CALL (OUTPATIENT)
Dept: ADMINISTRATIVE | Facility: CLINIC | Age: 86
End: 2021-03-01

## 2021-03-04 ENCOUNTER — OFFICE VISIT (OUTPATIENT)
Dept: FAMILY MEDICINE | Facility: CLINIC | Age: 86
End: 2021-03-04
Payer: MEDICARE

## 2021-03-04 VITALS
DIASTOLIC BLOOD PRESSURE: 73 MMHG | HEIGHT: 58 IN | HEART RATE: 84 BPM | BODY MASS INDEX: 28.55 KG/M2 | SYSTOLIC BLOOD PRESSURE: 140 MMHG | TEMPERATURE: 97 F | WEIGHT: 136 LBS

## 2021-03-04 DIAGNOSIS — I10 ESSENTIAL HYPERTENSION: Primary | ICD-10-CM

## 2021-03-04 DIAGNOSIS — H25.13 NUCLEAR SCLEROSIS OF BOTH EYES: ICD-10-CM

## 2021-03-04 DIAGNOSIS — M46.1 SACROILIITIS, NOT ELSEWHERE CLASSIFIED: ICD-10-CM

## 2021-03-04 DIAGNOSIS — M81.0 AGE-RELATED OSTEOPOROSIS WITHOUT CURRENT PATHOLOGICAL FRACTURE: Chronic | ICD-10-CM

## 2021-03-04 DIAGNOSIS — M54.16 LUMBAR RADICULOPATHY: ICD-10-CM

## 2021-03-04 DIAGNOSIS — Z85.828 HISTORY OF SKIN CANCER: ICD-10-CM

## 2021-03-04 DIAGNOSIS — I70.0 ARTERIOSCLEROSIS OF AORTA: ICD-10-CM

## 2021-03-04 DIAGNOSIS — E78.2 MIXED HYPERLIPIDEMIA: ICD-10-CM

## 2021-03-04 DIAGNOSIS — I38 HEART VALVE REGURGITATION: ICD-10-CM

## 2021-03-04 PROCEDURE — 3077F PR MOST RECENT SYSTOLIC BLOOD PRESSURE >= 140 MM HG: ICD-10-PCS | Mod: CPTII,S$GLB,, | Performed by: NURSE PRACTITIONER

## 2021-03-04 PROCEDURE — 3078F PR MOST RECENT DIASTOLIC BLOOD PRESSURE < 80 MM HG: ICD-10-PCS | Mod: CPTII,S$GLB,, | Performed by: NURSE PRACTITIONER

## 2021-03-04 PROCEDURE — 99999 PR PBB SHADOW E&M-EST. PATIENT-LVL IV: CPT | Mod: PBBFAC,,, | Performed by: NURSE PRACTITIONER

## 2021-03-04 PROCEDURE — 1101F PT FALLS ASSESS-DOCD LE1/YR: CPT | Mod: CPTII,S$GLB,, | Performed by: NURSE PRACTITIONER

## 2021-03-04 PROCEDURE — G0439 PR MEDICARE ANNUAL WELLNESS SUBSEQUENT VISIT: ICD-10-PCS | Mod: S$GLB,,, | Performed by: NURSE PRACTITIONER

## 2021-03-04 PROCEDURE — 99999 PR PBB SHADOW E&M-EST. PATIENT-LVL IV: ICD-10-PCS | Mod: PBBFAC,,, | Performed by: NURSE PRACTITIONER

## 2021-03-04 PROCEDURE — 1126F AMNT PAIN NOTED NONE PRSNT: CPT | Mod: S$GLB,,, | Performed by: NURSE PRACTITIONER

## 2021-03-04 PROCEDURE — 99499 UNLISTED E&M SERVICE: CPT | Mod: HCNC,S$GLB,, | Performed by: NURSE PRACTITIONER

## 2021-03-04 PROCEDURE — 3288F PR FALLS RISK ASSESSMENT DOCUMENTED: ICD-10-PCS | Mod: CPTII,S$GLB,, | Performed by: NURSE PRACTITIONER

## 2021-03-04 PROCEDURE — 99499 RISK ADDL DX/OHS AUDIT: ICD-10-PCS | Mod: HCNC,S$GLB,, | Performed by: NURSE PRACTITIONER

## 2021-03-04 PROCEDURE — 3288F FALL RISK ASSESSMENT DOCD: CPT | Mod: CPTII,S$GLB,, | Performed by: NURSE PRACTITIONER

## 2021-03-04 PROCEDURE — 3077F SYST BP >= 140 MM HG: CPT | Mod: CPTII,S$GLB,, | Performed by: NURSE PRACTITIONER

## 2021-03-04 PROCEDURE — 1126F PR PAIN SEVERITY QUANTIFIED, NO PAIN PRESENT: ICD-10-PCS | Mod: S$GLB,,, | Performed by: NURSE PRACTITIONER

## 2021-03-04 PROCEDURE — 3078F DIAST BP <80 MM HG: CPT | Mod: CPTII,S$GLB,, | Performed by: NURSE PRACTITIONER

## 2021-03-04 PROCEDURE — G0439 PPPS, SUBSEQ VISIT: HCPCS | Mod: S$GLB,,, | Performed by: NURSE PRACTITIONER

## 2021-03-04 PROCEDURE — 1101F PR PT FALLS ASSESS DOC 0-1 FALLS W/OUT INJ PAST YR: ICD-10-PCS | Mod: CPTII,S$GLB,, | Performed by: NURSE PRACTITIONER

## 2021-03-08 ENCOUNTER — HOSPITAL ENCOUNTER (OUTPATIENT)
Dept: RADIOLOGY | Facility: HOSPITAL | Age: 86
Discharge: HOME | End: 2021-03-08
Attending: NURSE PRACTITIONER
Payer: MEDICARE

## 2021-03-08 DIAGNOSIS — M81.0 AGE-RELATED OSTEOPOROSIS WITHOUT CURRENT PATHOLOGICAL FRACTURE: ICD-10-CM

## 2021-03-08 PROCEDURE — 77080 DEXA BONE DENSITY SPINE HIP: ICD-10-PCS | Mod: 26,,, | Performed by: RADIOLOGY

## 2021-03-08 PROCEDURE — 77080 DXA BONE DENSITY AXIAL: CPT | Mod: TC,PO

## 2021-03-08 PROCEDURE — 77080 DXA BONE DENSITY AXIAL: CPT | Mod: 26,,, | Performed by: RADIOLOGY

## 2021-03-12 RX ORDER — ALENDRONATE SODIUM 70 MG/1
70 TABLET ORAL
Qty: 4 TABLET | Refills: 11 | OUTPATIENT
Start: 2021-03-12 | End: 2022-03-12

## 2021-05-13 ENCOUNTER — TELEPHONE (OUTPATIENT)
Dept: NEUROSURGERY | Facility: CLINIC | Age: 86
End: 2021-05-13

## 2021-05-13 DIAGNOSIS — M54.9 BACK PAIN, UNSPECIFIED BACK LOCATION, UNSPECIFIED BACK PAIN LATERALITY, UNSPECIFIED CHRONICITY: Primary | ICD-10-CM

## 2021-05-15 ENCOUNTER — HOSPITAL ENCOUNTER (EMERGENCY)
Facility: HOSPITAL | Age: 86
Discharge: HOME | End: 2021-05-15
Attending: FAMILY MEDICINE
Payer: MEDICARE

## 2021-05-15 VITALS
DIASTOLIC BLOOD PRESSURE: 67 MMHG | RESPIRATION RATE: 18 BRPM | SYSTOLIC BLOOD PRESSURE: 163 MMHG | TEMPERATURE: 98 F | OXYGEN SATURATION: 94 % | HEART RATE: 74 BPM

## 2021-05-15 DIAGNOSIS — M54.31 SCIATICA OF RIGHT SIDE: Primary | ICD-10-CM

## 2021-05-15 PROCEDURE — 63600175 PHARM REV CODE 636 W HCPCS: Performed by: FAMILY MEDICINE

## 2021-05-15 PROCEDURE — 25000003 PHARM REV CODE 250: Performed by: FAMILY MEDICINE

## 2021-05-15 PROCEDURE — 99284 EMERGENCY DEPT VISIT MOD MDM: CPT | Mod: 25

## 2021-05-15 PROCEDURE — 96372 THER/PROPH/DIAG INJ SC/IM: CPT

## 2021-05-15 RX ORDER — GABAPENTIN 300 MG/1
300 CAPSULE ORAL ONCE
Status: COMPLETED | OUTPATIENT
Start: 2021-05-15 | End: 2021-05-15

## 2021-05-15 RX ORDER — KETOROLAC TROMETHAMINE 30 MG/ML
30 INJECTION, SOLUTION INTRAMUSCULAR; INTRAVENOUS
Status: COMPLETED | OUTPATIENT
Start: 2021-05-15 | End: 2021-05-15

## 2021-05-15 RX ORDER — GABAPENTIN 100 MG/1
100 CAPSULE ORAL 2 TIMES DAILY
Qty: 14 CAPSULE | Refills: 0 | Status: SHIPPED | OUTPATIENT
Start: 2021-05-15 | End: 2021-06-17 | Stop reason: SDUPTHER

## 2021-05-15 RX ORDER — METHYLPREDNISOLONE SOD SUCC 125 MG
125 VIAL (EA) INJECTION
Status: COMPLETED | OUTPATIENT
Start: 2021-05-15 | End: 2021-05-15

## 2021-05-15 RX ADMIN — METHYLPREDNISOLONE SODIUM SUCCINATE 125 MG: 125 INJECTION, POWDER, FOR SOLUTION INTRAMUSCULAR; INTRAVENOUS at 09:05

## 2021-05-15 RX ADMIN — GABAPENTIN 300 MG: 300 CAPSULE ORAL at 09:05

## 2021-05-15 RX ADMIN — KETOROLAC TROMETHAMINE 30 MG: 30 INJECTION, SOLUTION INTRAMUSCULAR; INTRAVENOUS at 09:05

## 2021-05-17 ENCOUNTER — HOSPITAL ENCOUNTER (OUTPATIENT)
Dept: RADIOLOGY | Facility: HOSPITAL | Age: 86
Discharge: HOME | End: 2021-05-17
Attending: NEUROLOGICAL SURGERY
Payer: MEDICARE

## 2021-05-17 ENCOUNTER — OFFICE VISIT (OUTPATIENT)
Dept: NEUROSURGERY | Facility: CLINIC | Age: 86
End: 2021-05-17
Payer: MEDICARE

## 2021-05-17 VITALS — SYSTOLIC BLOOD PRESSURE: 133 MMHG | TEMPERATURE: 98 F | HEART RATE: 74 BPM | DIASTOLIC BLOOD PRESSURE: 77 MMHG

## 2021-05-17 DIAGNOSIS — M43.16 SPONDYLOLISTHESIS, LUMBAR REGION: ICD-10-CM

## 2021-05-17 DIAGNOSIS — M81.0 AGE-RELATED OSTEOPOROSIS WITHOUT CURRENT PATHOLOGICAL FRACTURE: ICD-10-CM

## 2021-05-17 DIAGNOSIS — M54.9 BACK PAIN, UNSPECIFIED BACK LOCATION, UNSPECIFIED BACK PAIN LATERALITY, UNSPECIFIED CHRONICITY: ICD-10-CM

## 2021-05-17 DIAGNOSIS — M54.9 DORSALGIA, UNSPECIFIED: ICD-10-CM

## 2021-05-17 DIAGNOSIS — M54.16 LUMBAR RADICULOPATHY: Primary | ICD-10-CM

## 2021-05-17 PROCEDURE — 72110 XR LUMBAR SPINE AP AND LAT WITH FLEX/EXT: ICD-10-PCS | Mod: 26,,, | Performed by: RADIOLOGY

## 2021-05-17 PROCEDURE — 1101F PT FALLS ASSESS-DOCD LE1/YR: CPT | Mod: CPTII,S$GLB,, | Performed by: NURSE PRACTITIONER

## 2021-05-17 PROCEDURE — 1101F PR PT FALLS ASSESS DOC 0-1 FALLS W/OUT INJ PAST YR: ICD-10-PCS | Mod: CPTII,S$GLB,, | Performed by: NURSE PRACTITIONER

## 2021-05-17 PROCEDURE — 99213 OFFICE O/P EST LOW 20 MIN: CPT | Mod: S$GLB,,, | Performed by: NURSE PRACTITIONER

## 2021-05-17 PROCEDURE — 99213 PR OFFICE/OUTPT VISIT, EST, LEVL III, 20-29 MIN: ICD-10-PCS | Mod: S$GLB,,, | Performed by: NURSE PRACTITIONER

## 2021-05-17 PROCEDURE — 72110 X-RAY EXAM L-2 SPINE 4/>VWS: CPT | Mod: 26,,, | Performed by: RADIOLOGY

## 2021-05-17 PROCEDURE — 1125F PR PAIN SEVERITY QUANTIFIED, PAIN PRESENT: ICD-10-PCS | Mod: S$GLB,,, | Performed by: NURSE PRACTITIONER

## 2021-05-17 PROCEDURE — 1159F PR MEDICATION LIST DOCUMENTED IN MEDICAL RECORD: ICD-10-PCS | Mod: S$GLB,,, | Performed by: NURSE PRACTITIONER

## 2021-05-17 PROCEDURE — 3288F FALL RISK ASSESSMENT DOCD: CPT | Mod: CPTII,S$GLB,, | Performed by: NURSE PRACTITIONER

## 2021-05-17 PROCEDURE — 1159F MED LIST DOCD IN RCRD: CPT | Mod: S$GLB,,, | Performed by: NURSE PRACTITIONER

## 2021-05-17 PROCEDURE — 99999 PR PBB SHADOW E&M-EST. PATIENT-LVL III: ICD-10-PCS | Mod: PBBFAC,,, | Performed by: NURSE PRACTITIONER

## 2021-05-17 PROCEDURE — 72110 X-RAY EXAM L-2 SPINE 4/>VWS: CPT | Mod: TC

## 2021-05-17 PROCEDURE — 99999 PR PBB SHADOW E&M-EST. PATIENT-LVL III: CPT | Mod: PBBFAC,,, | Performed by: NURSE PRACTITIONER

## 2021-05-17 PROCEDURE — 1125F AMNT PAIN NOTED PAIN PRSNT: CPT | Mod: S$GLB,,, | Performed by: NURSE PRACTITIONER

## 2021-05-17 PROCEDURE — 3288F PR FALLS RISK ASSESSMENT DOCUMENTED: ICD-10-PCS | Mod: CPTII,S$GLB,, | Performed by: NURSE PRACTITIONER

## 2021-05-18 RX ORDER — DIAZEPAM 2 MG/1
2 TABLET ORAL ONCE
Qty: 1 TABLET | Refills: 0 | Status: SHIPPED | OUTPATIENT
Start: 2021-05-18 | End: 2021-06-04 | Stop reason: SDUPTHER

## 2021-06-03 ENCOUNTER — TELEPHONE (OUTPATIENT)
Dept: NEUROSURGERY | Facility: CLINIC | Age: 86
End: 2021-06-03

## 2021-06-04 ENCOUNTER — TELEPHONE (OUTPATIENT)
Dept: NEUROSURGERY | Facility: CLINIC | Age: 86
End: 2021-06-04

## 2021-06-04 RX ORDER — DIAZEPAM 2 MG/1
2 TABLET ORAL ONCE
Qty: 1 TABLET | Refills: 0 | Status: SHIPPED | OUTPATIENT
Start: 2021-06-04 | End: 2021-06-15 | Stop reason: CLARIF

## 2021-06-07 ENCOUNTER — TELEPHONE (OUTPATIENT)
Dept: NEUROSURGERY | Facility: CLINIC | Age: 86
End: 2021-06-07

## 2021-06-07 DIAGNOSIS — M43.16 SPONDYLOLISTHESIS, LUMBAR REGION: ICD-10-CM

## 2021-06-07 DIAGNOSIS — M54.9 DORSALGIA, UNSPECIFIED: ICD-10-CM

## 2021-06-07 DIAGNOSIS — M54.16 LUMBAR RADICULOPATHY: Primary | ICD-10-CM

## 2021-06-08 DIAGNOSIS — Z01.812 PRE-PROCEDURE LAB EXAM: Primary | ICD-10-CM

## 2021-06-09 ENCOUNTER — HOSPITAL ENCOUNTER (OUTPATIENT)
Dept: RADIOLOGY | Facility: HOSPITAL | Age: 86
Discharge: HOME | End: 2021-06-09
Attending: NURSE PRACTITIONER
Payer: MEDICARE

## 2021-06-09 DIAGNOSIS — M54.9 DORSALGIA, UNSPECIFIED: ICD-10-CM

## 2021-06-09 DIAGNOSIS — M54.16 LUMBAR RADICULOPATHY: ICD-10-CM

## 2021-06-09 DIAGNOSIS — M43.16 SPONDYLOLISTHESIS, LUMBAR REGION: ICD-10-CM

## 2021-06-09 PROCEDURE — A9585 GADOBUTROL INJECTION: HCPCS | Performed by: NURSE PRACTITIONER

## 2021-06-09 PROCEDURE — 72158 MRI LUMBAR SPINE W/O & W/DYE: CPT | Mod: TC

## 2021-06-09 PROCEDURE — 25500020 PHARM REV CODE 255: Performed by: NURSE PRACTITIONER

## 2021-06-09 PROCEDURE — 72158 MRI LUMBAR SPINE W/O & W/DYE: CPT | Mod: 26,,, | Performed by: RADIOLOGY

## 2021-06-09 PROCEDURE — 72158 MRI LUMBAR SPINE W WO CONTRAST: ICD-10-PCS | Mod: 26,,, | Performed by: RADIOLOGY

## 2021-06-09 RX ORDER — GADOBUTROL 604.72 MG/ML
6 INJECTION INTRAVENOUS
Status: COMPLETED | OUTPATIENT
Start: 2021-06-09 | End: 2021-06-09

## 2021-06-09 RX ADMIN — GADOBUTROL 6 ML: 604.72 INJECTION INTRAVENOUS at 12:06

## 2021-06-14 ENCOUNTER — OFFICE VISIT (OUTPATIENT)
Dept: NEUROSURGERY | Facility: CLINIC | Age: 86
End: 2021-06-14
Payer: MEDICARE

## 2021-06-14 DIAGNOSIS — M54.16 LUMBAR RADICULOPATHY: Primary | ICD-10-CM

## 2021-06-14 DIAGNOSIS — M43.16 SPONDYLOLISTHESIS AT L4-L5 LEVEL: ICD-10-CM

## 2021-06-14 PROCEDURE — 1159F PR MEDICATION LIST DOCUMENTED IN MEDICAL RECORD: ICD-10-PCS | Mod: S$GLB,,, | Performed by: NEUROLOGICAL SURGERY

## 2021-06-14 PROCEDURE — 99999 PR PBB SHADOW E&M-EST. PATIENT-LVL I: ICD-10-PCS | Mod: PBBFAC,,, | Performed by: NEUROLOGICAL SURGERY

## 2021-06-14 PROCEDURE — 1159F MED LIST DOCD IN RCRD: CPT | Mod: S$GLB,,, | Performed by: NEUROLOGICAL SURGERY

## 2021-06-14 PROCEDURE — 99214 PR OFFICE/OUTPT VISIT, EST, LEVL IV, 30-39 MIN: ICD-10-PCS | Mod: S$GLB,,, | Performed by: NEUROLOGICAL SURGERY

## 2021-06-14 PROCEDURE — 99999 PR PBB SHADOW E&M-EST. PATIENT-LVL I: CPT | Mod: PBBFAC,,, | Performed by: NEUROLOGICAL SURGERY

## 2021-06-14 PROCEDURE — 99214 OFFICE O/P EST MOD 30 MIN: CPT | Mod: S$GLB,,, | Performed by: NEUROLOGICAL SURGERY

## 2021-06-15 ENCOUNTER — TELEPHONE (OUTPATIENT)
Dept: FAMILY MEDICINE | Facility: CLINIC | Age: 86
End: 2021-06-15

## 2021-06-15 ENCOUNTER — TELEPHONE (OUTPATIENT)
Dept: NEUROSURGERY | Facility: CLINIC | Age: 86
End: 2021-06-15

## 2021-06-15 ENCOUNTER — TELEPHONE (OUTPATIENT)
Dept: PREADMISSION TESTING | Facility: HOSPITAL | Age: 86
End: 2021-06-15

## 2021-06-15 DIAGNOSIS — Z01.818 PREOPERATIVE TESTING: Primary | ICD-10-CM

## 2021-06-15 DIAGNOSIS — M48.061 SPINAL STENOSIS OF LUMBAR REGION, UNSPECIFIED WHETHER NEUROGENIC CLAUDICATION PRESENT: ICD-10-CM

## 2021-06-15 DIAGNOSIS — M47.816 LUMBAR SPONDYLOSIS: ICD-10-CM

## 2021-06-15 DIAGNOSIS — M43.10 DEGENERATIVE SPONDYLOLISTHESIS: Primary | ICD-10-CM

## 2021-06-15 DIAGNOSIS — M79.604 PAIN OF RIGHT LOWER EXTREMITY: ICD-10-CM

## 2021-06-15 PROBLEM — M43.16 SPONDYLOLISTHESIS AT L4-L5 LEVEL: Status: ACTIVE | Noted: 2021-06-15

## 2021-06-15 RX ORDER — GLUCOSAMINE/CHONDR SU A SOD 167-133 MG
100 CAPSULE ORAL NIGHTLY
COMMUNITY
End: 2021-08-23

## 2021-06-17 DIAGNOSIS — M47.816 LUMBAR SPONDYLOSIS: Primary | ICD-10-CM

## 2021-06-17 DIAGNOSIS — M48.061 SPINAL STENOSIS OF LUMBAR REGION, UNSPECIFIED WHETHER NEUROGENIC CLAUDICATION PRESENT: ICD-10-CM

## 2021-06-17 DIAGNOSIS — M47.816 LUMBAR SPONDYLOSIS: ICD-10-CM

## 2021-06-17 RX ORDER — GABAPENTIN 100 MG/1
100 CAPSULE ORAL 2 TIMES DAILY
Qty: 14 CAPSULE | Refills: 0 | Status: SHIPPED | OUTPATIENT
Start: 2021-06-17 | End: 2021-06-17

## 2021-06-17 RX ORDER — GABAPENTIN 100 MG/1
100 CAPSULE ORAL 2 TIMES DAILY
Qty: 14 CAPSULE | Refills: 0 | Status: SHIPPED | OUTPATIENT
Start: 2021-06-17 | End: 2021-06-22 | Stop reason: CLARIF

## 2021-06-21 ENCOUNTER — HOSPITAL ENCOUNTER (OUTPATIENT)
Dept: PREADMISSION TESTING | Facility: HOSPITAL | Age: 86
Discharge: HOME | End: 2021-06-21
Attending: NEUROLOGICAL SURGERY
Payer: MEDICARE

## 2021-06-21 ENCOUNTER — ANESTHESIA EVENT (OUTPATIENT)
Dept: SURGERY | Facility: HOSPITAL | Age: 86
DRG: 460 | End: 2021-06-21
Payer: MEDICARE

## 2021-06-21 ENCOUNTER — HOSPITAL ENCOUNTER (OUTPATIENT)
Dept: CARDIOLOGY | Facility: CLINIC | Age: 86
Discharge: HOME | End: 2021-06-21
Payer: MEDICARE

## 2021-06-21 VITALS
WEIGHT: 136 LBS | HEART RATE: 83 BPM | DIASTOLIC BLOOD PRESSURE: 71 MMHG | BODY MASS INDEX: 28.55 KG/M2 | TEMPERATURE: 99 F | SYSTOLIC BLOOD PRESSURE: 149 MMHG | HEIGHT: 58 IN | OXYGEN SATURATION: 98 %

## 2021-06-21 DIAGNOSIS — Z01.818 PREOPERATIVE TESTING: ICD-10-CM

## 2021-06-21 PROCEDURE — 93010 ELECTROCARDIOGRAM REPORT: CPT | Mod: S$GLB,,, | Performed by: INTERNAL MEDICINE

## 2021-06-21 PROCEDURE — 93005 EKG 12-LEAD: ICD-10-PCS | Mod: S$GLB,,, | Performed by: ANESTHESIOLOGY

## 2021-06-21 PROCEDURE — 93010 EKG 12-LEAD: ICD-10-PCS | Mod: S$GLB,,, | Performed by: INTERNAL MEDICINE

## 2021-06-21 PROCEDURE — 93005 ELECTROCARDIOGRAM TRACING: CPT | Mod: S$GLB,,, | Performed by: ANESTHESIOLOGY

## 2021-06-22 ENCOUNTER — TELEPHONE (OUTPATIENT)
Dept: NEUROSURGERY | Facility: CLINIC | Age: 86
End: 2021-06-22

## 2021-06-22 ENCOUNTER — TELEPHONE (OUTPATIENT)
Dept: PREADMISSION TESTING | Facility: HOSPITAL | Age: 86
End: 2021-06-22

## 2021-06-22 ENCOUNTER — TELEPHONE (OUTPATIENT)
Dept: FAMILY MEDICINE | Facility: CLINIC | Age: 86
End: 2021-06-22

## 2021-06-22 ENCOUNTER — OFFICE VISIT (OUTPATIENT)
Dept: FAMILY MEDICINE | Facility: CLINIC | Age: 86
End: 2021-06-22
Payer: MEDICARE

## 2021-06-22 VITALS
DIASTOLIC BLOOD PRESSURE: 62 MMHG | WEIGHT: 136.69 LBS | TEMPERATURE: 98 F | SYSTOLIC BLOOD PRESSURE: 134 MMHG | BODY MASS INDEX: 28.69 KG/M2 | OXYGEN SATURATION: 97 % | HEART RATE: 82 BPM | HEIGHT: 58 IN

## 2021-06-22 DIAGNOSIS — Z01.818 PREOPERATIVE CLEARANCE: Primary | ICD-10-CM

## 2021-06-22 DIAGNOSIS — N39.0 URINARY TRACT INFECTION WITHOUT HEMATURIA, SITE UNSPECIFIED: ICD-10-CM

## 2021-06-22 PROCEDURE — 1159F MED LIST DOCD IN RCRD: CPT | Mod: S$GLB,,, | Performed by: NURSE PRACTITIONER

## 2021-06-22 PROCEDURE — 3288F FALL RISK ASSESSMENT DOCD: CPT | Mod: CPTII,S$GLB,, | Performed by: NURSE PRACTITIONER

## 2021-06-22 PROCEDURE — 1125F AMNT PAIN NOTED PAIN PRSNT: CPT | Mod: S$GLB,,, | Performed by: NURSE PRACTITIONER

## 2021-06-22 PROCEDURE — 99999 PR PBB SHADOW E&M-EST. PATIENT-LVL IV: CPT | Mod: PBBFAC,,, | Performed by: NURSE PRACTITIONER

## 2021-06-22 PROCEDURE — 1101F PR PT FALLS ASSESS DOC 0-1 FALLS W/OUT INJ PAST YR: ICD-10-PCS | Mod: CPTII,S$GLB,, | Performed by: NURSE PRACTITIONER

## 2021-06-22 PROCEDURE — 99214 OFFICE O/P EST MOD 30 MIN: CPT | Mod: 25,S$GLB,, | Performed by: NURSE PRACTITIONER

## 2021-06-22 PROCEDURE — 99214 PR OFFICE/OUTPT VISIT, EST, LEVL IV, 30-39 MIN: ICD-10-PCS | Mod: 25,S$GLB,, | Performed by: NURSE PRACTITIONER

## 2021-06-22 PROCEDURE — 1101F PT FALLS ASSESS-DOCD LE1/YR: CPT | Mod: CPTII,S$GLB,, | Performed by: NURSE PRACTITIONER

## 2021-06-22 PROCEDURE — 1125F PR PAIN SEVERITY QUANTIFIED, PAIN PRESENT: ICD-10-PCS | Mod: S$GLB,,, | Performed by: NURSE PRACTITIONER

## 2021-06-22 PROCEDURE — 96372 THER/PROPH/DIAG INJ SC/IM: CPT | Mod: S$GLB,,, | Performed by: NURSE PRACTITIONER

## 2021-06-22 PROCEDURE — 99999 PR PBB SHADOW E&M-EST. PATIENT-LVL IV: ICD-10-PCS | Mod: PBBFAC,,, | Performed by: NURSE PRACTITIONER

## 2021-06-22 PROCEDURE — 1159F PR MEDICATION LIST DOCUMENTED IN MEDICAL RECORD: ICD-10-PCS | Mod: S$GLB,,, | Performed by: NURSE PRACTITIONER

## 2021-06-22 PROCEDURE — 96372 PR INJECTION,THERAP/PROPH/DIAG2ST, IM OR SUBCUT: ICD-10-PCS | Mod: S$GLB,,, | Performed by: NURSE PRACTITIONER

## 2021-06-22 PROCEDURE — 3288F PR FALLS RISK ASSESSMENT DOCUMENTED: ICD-10-PCS | Mod: CPTII,S$GLB,, | Performed by: NURSE PRACTITIONER

## 2021-06-22 RX ORDER — CEFTRIAXONE 1 G/1
1 INJECTION, POWDER, FOR SOLUTION INTRAMUSCULAR; INTRAVENOUS
Status: COMPLETED | OUTPATIENT
Start: 2021-06-22 | End: 2021-06-22

## 2021-06-22 RX ORDER — SULFAMETHOXAZOLE AND TRIMETHOPRIM 800; 160 MG/1; MG/1
1 TABLET ORAL 2 TIMES DAILY
Qty: 6 TABLET | Refills: 0 | Status: SHIPPED | OUTPATIENT
Start: 2021-06-22 | End: 2021-06-25

## 2021-06-22 RX ADMIN — CEFTRIAXONE 1 G: 1 INJECTION, POWDER, FOR SOLUTION INTRAMUSCULAR; INTRAVENOUS at 11:06

## 2021-06-28 ENCOUNTER — TELEPHONE (OUTPATIENT)
Dept: NEUROSURGERY | Facility: CLINIC | Age: 86
End: 2021-06-28

## 2021-06-29 ENCOUNTER — HOSPITAL ENCOUNTER (INPATIENT)
Facility: HOSPITAL | Age: 86
LOS: 2 days | Discharge: HOME HEALTH | DRG: 460 | End: 2021-07-01
Attending: NEUROLOGICAL SURGERY | Admitting: NEUROLOGICAL SURGERY
Payer: MEDICARE

## 2021-06-29 ENCOUNTER — ANESTHESIA (OUTPATIENT)
Dept: SURGERY | Facility: HOSPITAL | Age: 86
DRG: 460 | End: 2021-06-29
Payer: MEDICARE

## 2021-06-29 DIAGNOSIS — M43.16 SPONDYLOLISTHESIS AT L4-L5 LEVEL: ICD-10-CM

## 2021-06-29 DIAGNOSIS — Z01.818 PREOPERATIVE TESTING: ICD-10-CM

## 2021-06-29 LAB
ANION GAP SERPL CALC-SCNC: 12 MMOL/L (ref 8–16)
APTT BLDCRRT: 25.6 SEC (ref 21–32)
BASOPHILS # BLD AUTO: 0.05 K/UL (ref 0–0.2)
BASOPHILS NFR BLD: 0.7 % (ref 0–1.9)
BUN SERPL-MCNC: 20 MG/DL (ref 8–23)
CALCIUM SERPL-MCNC: 10 MG/DL (ref 8.7–10.5)
CHLORIDE SERPL-SCNC: 103 MMOL/L (ref 95–110)
CO2 SERPL-SCNC: 25 MMOL/L (ref 23–29)
CREAT SERPL-MCNC: 0.8 MG/DL (ref 0.5–1.4)
DIFFERENTIAL METHOD: ABNORMAL
EOSINOPHIL # BLD AUTO: 0 K/UL (ref 0–0.5)
EOSINOPHIL NFR BLD: 0.6 % (ref 0–8)
ERYTHROCYTE [DISTWIDTH] IN BLOOD BY AUTOMATED COUNT: 13.3 % (ref 11.5–14.5)
EST. GFR  (AFRICAN AMERICAN): >60 ML/MIN/1.73 M^2
EST. GFR  (NON AFRICAN AMERICAN): >60 ML/MIN/1.73 M^2
GLUCOSE SERPL-MCNC: 103 MG/DL (ref 70–110)
HCT VFR BLD AUTO: 44.1 % (ref 37–48.5)
HGB BLD-MCNC: 14.5 G/DL (ref 12–16)
IMM GRANULOCYTES # BLD AUTO: 0.02 K/UL (ref 0–0.04)
IMM GRANULOCYTES NFR BLD AUTO: 0.3 % (ref 0–0.5)
INR PPP: 0.9 (ref 0.8–1.2)
LYMPHOCYTES # BLD AUTO: 2.2 K/UL (ref 1–4.8)
LYMPHOCYTES NFR BLD: 30.7 % (ref 18–48)
MCH RBC QN AUTO: 29.6 PG (ref 27–31)
MCHC RBC AUTO-ENTMCNC: 32.9 G/DL (ref 32–36)
MCV RBC AUTO: 90 FL (ref 82–98)
MONOCYTES # BLD AUTO: 0.8 K/UL (ref 0.3–1)
MONOCYTES NFR BLD: 11.5 % (ref 4–15)
NEUTROPHILS # BLD AUTO: 4 K/UL (ref 1.8–7.7)
NEUTROPHILS NFR BLD: 56.2 % (ref 38–73)
NRBC BLD-RTO: 0 /100 WBC
PLATELET # BLD AUTO: 313 K/UL (ref 150–450)
PMV BLD AUTO: 8.8 FL (ref 9.2–12.9)
POTASSIUM SERPL-SCNC: 3.9 MMOL/L (ref 3.5–5.1)
PROTHROMBIN TIME: 10.4 SEC (ref 9–12.5)
RBC # BLD AUTO: 4.9 M/UL (ref 4–5.4)
SODIUM SERPL-SCNC: 140 MMOL/L (ref 136–145)
WBC # BLD AUTO: 7.03 K/UL (ref 3.9–12.7)

## 2021-06-29 PROCEDURE — 36000710: Performed by: NEUROLOGICAL SURGERY

## 2021-06-29 PROCEDURE — 25000003 PHARM REV CODE 250: Performed by: NEUROLOGICAL SURGERY

## 2021-06-29 PROCEDURE — 63600175 PHARM REV CODE 636 W HCPCS: Performed by: NURSE ANESTHETIST, CERTIFIED REGISTERED

## 2021-06-29 PROCEDURE — 80048 BASIC METABOLIC PNL TOTAL CA: CPT | Performed by: STUDENT IN AN ORGANIZED HEALTH CARE EDUCATION/TRAINING PROGRAM

## 2021-06-29 PROCEDURE — 86920 COMPATIBILITY TEST SPIN: CPT | Performed by: ANESTHESIOLOGY

## 2021-06-29 PROCEDURE — 25000003 PHARM REV CODE 250: Performed by: STUDENT IN AN ORGANIZED HEALTH CARE EDUCATION/TRAINING PROGRAM

## 2021-06-29 PROCEDURE — 20936 SP BONE AGRFT LOCAL ADD-ON: CPT | Mod: ,,, | Performed by: NEUROLOGICAL SURGERY

## 2021-06-29 PROCEDURE — 20930 PR ALLOGRAFT FOR SPINE SURGERY ONLY MORSELIZED: ICD-10-PCS | Mod: ,,, | Performed by: NEUROLOGICAL SURGERY

## 2021-06-29 PROCEDURE — 71000015 HC POSTOP RECOV 1ST HR: Performed by: NEUROLOGICAL SURGERY

## 2021-06-29 PROCEDURE — 11000001 HC ACUTE MED/SURG PRIVATE ROOM

## 2021-06-29 PROCEDURE — 36415 COLL VENOUS BLD VENIPUNCTURE: CPT | Performed by: STUDENT IN AN ORGANIZED HEALTH CARE EDUCATION/TRAINING PROGRAM

## 2021-06-29 PROCEDURE — 85730 THROMBOPLASTIN TIME PARTIAL: CPT | Performed by: STUDENT IN AN ORGANIZED HEALTH CARE EDUCATION/TRAINING PROGRAM

## 2021-06-29 PROCEDURE — 63600175 PHARM REV CODE 636 W HCPCS: Performed by: STUDENT IN AN ORGANIZED HEALTH CARE EDUCATION/TRAINING PROGRAM

## 2021-06-29 PROCEDURE — 20930 SP BONE ALGRFT MORSEL ADD-ON: CPT | Mod: ,,, | Performed by: NEUROLOGICAL SURGERY

## 2021-06-29 PROCEDURE — 71000016 HC POSTOP RECOV ADDL HR: Performed by: NEUROLOGICAL SURGERY

## 2021-06-29 PROCEDURE — 63600175 PHARM REV CODE 636 W HCPCS

## 2021-06-29 PROCEDURE — 22633 PR ARTHRODESIS, COMBINED TECHN, SNGL INTERSPACE, LUMBAR: ICD-10-PCS | Mod: ,,, | Performed by: NEUROLOGICAL SURGERY

## 2021-06-29 PROCEDURE — 27201423 OPTIME MED/SURG SUP & DEVICES STERILE SUPPLY: Performed by: NEUROLOGICAL SURGERY

## 2021-06-29 PROCEDURE — C1713 ANCHOR/SCREW BN/BN,TIS/BN: HCPCS | Performed by: NEUROLOGICAL SURGERY

## 2021-06-29 PROCEDURE — 22840 PR POSTERIOR NON-SEGMENTAL INSTRUMENTATION: ICD-10-PCS | Mod: ,,, | Performed by: NEUROLOGICAL SURGERY

## 2021-06-29 PROCEDURE — 22840 INSERT SPINE FIXATION DEVICE: CPT | Mod: ,,, | Performed by: NEUROLOGICAL SURGERY

## 2021-06-29 PROCEDURE — D9220A PRA ANESTHESIA: ICD-10-PCS | Mod: ANES,,, | Performed by: ANESTHESIOLOGY

## 2021-06-29 PROCEDURE — 20936 PR AUTOGRAFT SPINE SURGERY LOCAL FROM SAME INCISION: ICD-10-PCS | Mod: ,,, | Performed by: NEUROLOGICAL SURGERY

## 2021-06-29 PROCEDURE — 85610 PROTHROMBIN TIME: CPT | Performed by: STUDENT IN AN ORGANIZED HEALTH CARE EDUCATION/TRAINING PROGRAM

## 2021-06-29 PROCEDURE — D9220A PRA ANESTHESIA: Mod: CRNA,,, | Performed by: NURSE ANESTHETIST, CERTIFIED REGISTERED

## 2021-06-29 PROCEDURE — 22853 PR INSERT BIOMECH DEV W/INTERBODY ARTHRODESIS, EA CONTIGUOUS DEFECT: ICD-10-PCS | Mod: ,,, | Performed by: NEUROLOGICAL SURGERY

## 2021-06-29 PROCEDURE — 85025 COMPLETE CBC W/AUTO DIFF WBC: CPT | Performed by: STUDENT IN AN ORGANIZED HEALTH CARE EDUCATION/TRAINING PROGRAM

## 2021-06-29 PROCEDURE — 37000009 HC ANESTHESIA EA ADD 15 MINS: Performed by: NEUROLOGICAL SURGERY

## 2021-06-29 PROCEDURE — 22853 INSJ BIOMECHANICAL DEVICE: CPT | Mod: ,,, | Performed by: NEUROLOGICAL SURGERY

## 2021-06-29 PROCEDURE — D9220A PRA ANESTHESIA: ICD-10-PCS | Mod: CRNA,,, | Performed by: NURSE ANESTHETIST, CERTIFIED REGISTERED

## 2021-06-29 PROCEDURE — 25000003 PHARM REV CODE 250: Performed by: NURSE ANESTHETIST, CERTIFIED REGISTERED

## 2021-06-29 PROCEDURE — 94761 N-INVAS EAR/PLS OXIMETRY MLT: CPT

## 2021-06-29 PROCEDURE — 37000008 HC ANESTHESIA 1ST 15 MINUTES: Performed by: NEUROLOGICAL SURGERY

## 2021-06-29 PROCEDURE — D9220A PRA ANESTHESIA: Mod: ANES,,, | Performed by: ANESTHESIOLOGY

## 2021-06-29 PROCEDURE — 71000033 HC RECOVERY, INTIAL HOUR: Performed by: NEUROLOGICAL SURGERY

## 2021-06-29 PROCEDURE — 36000711: Performed by: NEUROLOGICAL SURGERY

## 2021-06-29 PROCEDURE — 63600175 PHARM REV CODE 636 W HCPCS: Performed by: ANESTHESIOLOGY

## 2021-06-29 PROCEDURE — 63600175 PHARM REV CODE 636 W HCPCS: Performed by: NEUROLOGICAL SURGERY

## 2021-06-29 PROCEDURE — 22633 ARTHRD CMBN 1NTRSPC LUMBAR: CPT | Mod: ,,, | Performed by: NEUROLOGICAL SURGERY

## 2021-06-29 PROCEDURE — 27800903 OPTIME MED/SURG SUP & DEVICES OTHER IMPLANTS: Performed by: NEUROLOGICAL SURGERY

## 2021-06-29 PROCEDURE — C1769 GUIDE WIRE: HCPCS | Performed by: NEUROLOGICAL SURGERY

## 2021-06-29 DEVICE — ACTIFUSE ABX PUTTY 5ML: Type: IMPLANTABLE DEVICE | Site: SPINE LUMBAR | Status: FUNCTIONAL

## 2021-06-29 DEVICE — SCREW SNIPER 6.5MM X 40MM: Type: IMPLANTABLE DEVICE | Site: SPINE LUMBAR | Status: FUNCTIONAL

## 2021-06-29 DEVICE — SET SCREW SPINAL LOCKING: Type: IMPLANTABLE DEVICE | Site: SPINE LUMBAR | Status: FUNCTIONAL

## 2021-06-29 DEVICE — SCREW SNIPER 6.5MM X 45MM: Type: IMPLANTABLE DEVICE | Site: SPINE LUMBAR | Status: FUNCTIONAL

## 2021-06-29 DEVICE — ROD SPINAL PERC 40MM LORDOSED: Type: IMPLANTABLE DEVICE | Site: SPINE LUMBAR | Status: FUNCTIONAL

## 2021-06-29 DEVICE — ROD SPINAL LORDOTIC 5.5X35MM: Type: IMPLANTABLE DEVICE | Site: SPINE LUMBAR | Status: FUNCTIONAL

## 2021-06-29 DEVICE — SPACER VELOCITY 25X10X8-14: Type: IMPLANTABLE DEVICE | Site: SPINE LUMBAR | Status: FUNCTIONAL

## 2021-06-29 RX ORDER — VERAPAMIL HYDROCHLORIDE 40 MG/1
120 TABLET ORAL 2 TIMES DAILY
Status: DISCONTINUED | OUTPATIENT
Start: 2021-06-29 | End: 2021-07-01 | Stop reason: HOSPADM

## 2021-06-29 RX ORDER — ALBUTEROL SULFATE 90 UG/1
1 AEROSOL, METERED RESPIRATORY (INHALATION) EVERY 4 HOURS PRN
Status: DISCONTINUED | OUTPATIENT
Start: 2021-06-29 | End: 2021-07-01 | Stop reason: HOSPADM

## 2021-06-29 RX ORDER — DIPHENHYDRAMINE HCL 25 MG
25 CAPSULE ORAL EVERY 6 HOURS PRN
Status: DISCONTINUED | OUTPATIENT
Start: 2021-06-29 | End: 2021-07-01 | Stop reason: HOSPADM

## 2021-06-29 RX ORDER — MUPIROCIN 20 MG/G
OINTMENT TOPICAL 2 TIMES DAILY
Status: DISCONTINUED | OUTPATIENT
Start: 2021-06-29 | End: 2021-07-01 | Stop reason: HOSPADM

## 2021-06-29 RX ORDER — METHYLPREDNISOLONE ACETATE 40 MG/ML
INJECTION, SUSPENSION INTRA-ARTICULAR; INTRALESIONAL; INTRAMUSCULAR; SOFT TISSUE
Status: DISCONTINUED | OUTPATIENT
Start: 2021-06-29 | End: 2021-06-29 | Stop reason: HOSPADM

## 2021-06-29 RX ORDER — HYDROMORPHONE HYDROCHLORIDE 1 MG/ML
2 INJECTION, SOLUTION INTRAMUSCULAR; INTRAVENOUS; SUBCUTANEOUS
Status: DISCONTINUED | OUTPATIENT
Start: 2021-06-29 | End: 2021-06-29

## 2021-06-29 RX ORDER — POLYETHYLENE GLYCOL 3350 17 G/17G
17 POWDER, FOR SOLUTION ORAL DAILY
Status: DISCONTINUED | OUTPATIENT
Start: 2021-06-30 | End: 2021-07-01 | Stop reason: HOSPADM

## 2021-06-29 RX ORDER — BACITRACIN ZINC 500 UNIT/G
OINTMENT (GRAM) TOPICAL
Status: DISCONTINUED | OUTPATIENT
Start: 2021-06-29 | End: 2021-06-29 | Stop reason: HOSPADM

## 2021-06-29 RX ORDER — ONDANSETRON 2 MG/ML
INJECTION INTRAMUSCULAR; INTRAVENOUS
Status: DISCONTINUED | OUTPATIENT
Start: 2021-06-29 | End: 2021-06-29

## 2021-06-29 RX ORDER — AMOXICILLIN 250 MG
2 CAPSULE ORAL 2 TIMES DAILY
Status: DISCONTINUED | OUTPATIENT
Start: 2021-06-29 | End: 2021-07-01 | Stop reason: HOSPADM

## 2021-06-29 RX ORDER — HYDROMORPHONE HYDROCHLORIDE 1 MG/ML
INJECTION, SOLUTION INTRAMUSCULAR; INTRAVENOUS; SUBCUTANEOUS
Status: COMPLETED
Start: 2021-06-29 | End: 2021-06-29

## 2021-06-29 RX ORDER — LIDOCAINE HYDROCHLORIDE AND EPINEPHRINE 10; 10 MG/ML; UG/ML
INJECTION, SOLUTION INFILTRATION; PERINEURAL
Status: DISCONTINUED | OUTPATIENT
Start: 2021-06-29 | End: 2021-06-29 | Stop reason: HOSPADM

## 2021-06-29 RX ORDER — OXYCODONE HYDROCHLORIDE 5 MG/1
5 TABLET ORAL EVERY 4 HOURS PRN
Status: DISCONTINUED | OUTPATIENT
Start: 2021-06-29 | End: 2021-07-01 | Stop reason: HOSPADM

## 2021-06-29 RX ORDER — PROCHLORPERAZINE EDISYLATE 5 MG/ML
5 INJECTION INTRAMUSCULAR; INTRAVENOUS EVERY 6 HOURS PRN
Status: DISCONTINUED | OUTPATIENT
Start: 2021-06-29 | End: 2021-07-01 | Stop reason: HOSPADM

## 2021-06-29 RX ORDER — HYDROCHLOROTHIAZIDE 12.5 MG/1
12.5 TABLET ORAL DAILY
Refills: 1 | Status: DISCONTINUED | OUTPATIENT
Start: 2021-06-29 | End: 2021-07-01 | Stop reason: HOSPADM

## 2021-06-29 RX ORDER — DEXAMETHASONE SODIUM PHOSPHATE 4 MG/ML
INJECTION, SOLUTION INTRA-ARTICULAR; INTRALESIONAL; INTRAMUSCULAR; INTRAVENOUS; SOFT TISSUE
Status: DISCONTINUED | OUTPATIENT
Start: 2021-06-29 | End: 2021-06-29

## 2021-06-29 RX ORDER — SODIUM CHLORIDE 0.9 % (FLUSH) 0.9 %
3 SYRINGE (ML) INJECTION
Status: DISCONTINUED | OUTPATIENT
Start: 2021-06-29 | End: 2021-07-01 | Stop reason: HOSPADM

## 2021-06-29 RX ORDER — CEFAZOLIN SODIUM 1 G/3ML
2 INJECTION, POWDER, FOR SOLUTION INTRAMUSCULAR; INTRAVENOUS
Status: COMPLETED | OUTPATIENT
Start: 2021-06-29 | End: 2021-06-29

## 2021-06-29 RX ORDER — HYDROMORPHONE HYDROCHLORIDE 1 MG/ML
1 INJECTION, SOLUTION INTRAMUSCULAR; INTRAVENOUS; SUBCUTANEOUS EVERY 4 HOURS PRN
Status: DISCONTINUED | OUTPATIENT
Start: 2021-06-29 | End: 2021-06-30

## 2021-06-29 RX ORDER — SUCCINYLCHOLINE CHLORIDE 20 MG/ML
INJECTION INTRAMUSCULAR; INTRAVENOUS
Status: DISCONTINUED | OUTPATIENT
Start: 2021-06-29 | End: 2021-06-29

## 2021-06-29 RX ORDER — ACETAMINOPHEN 10 MG/ML
INJECTION, SOLUTION INTRAVENOUS
Status: DISCONTINUED | OUTPATIENT
Start: 2021-06-29 | End: 2021-06-29

## 2021-06-29 RX ORDER — MUPIROCIN 20 MG/G
1 OINTMENT TOPICAL 2 TIMES DAILY
Status: DISCONTINUED | OUTPATIENT
Start: 2021-06-29 | End: 2021-06-29 | Stop reason: HOSPADM

## 2021-06-29 RX ORDER — PROMETHAZINE HYDROCHLORIDE 25 MG/1
25 TABLET ORAL EVERY 6 HOURS PRN
Status: DISCONTINUED | OUTPATIENT
Start: 2021-06-29 | End: 2021-07-01 | Stop reason: HOSPADM

## 2021-06-29 RX ORDER — AMOXICILLIN 250 MG
2 CAPSULE ORAL NIGHTLY
Status: DISCONTINUED | OUTPATIENT
Start: 2021-06-29 | End: 2021-06-29

## 2021-06-29 RX ORDER — LIDOCAINE HYDROCHLORIDE 20 MG/ML
INJECTION INTRAVENOUS
Status: DISCONTINUED | OUTPATIENT
Start: 2021-06-29 | End: 2021-06-29

## 2021-06-29 RX ORDER — FENTANYL CITRATE 50 UG/ML
INJECTION, SOLUTION INTRAMUSCULAR; INTRAVENOUS
Status: DISCONTINUED | OUTPATIENT
Start: 2021-06-29 | End: 2021-06-29

## 2021-06-29 RX ORDER — MUPIROCIN 20 MG/G
OINTMENT TOPICAL
Status: DISCONTINUED | OUTPATIENT
Start: 2021-06-29 | End: 2021-06-29 | Stop reason: HOSPADM

## 2021-06-29 RX ORDER — ONDANSETRON 8 MG/1
8 TABLET, ORALLY DISINTEGRATING ORAL EVERY 6 HOURS PRN
Status: DISCONTINUED | OUTPATIENT
Start: 2021-06-29 | End: 2021-07-01 | Stop reason: HOSPADM

## 2021-06-29 RX ORDER — ACETAMINOPHEN 325 MG/1
650 TABLET ORAL EVERY 6 HOURS PRN
Status: DISCONTINUED | OUTPATIENT
Start: 2021-06-29 | End: 2021-06-29

## 2021-06-29 RX ORDER — ACETAMINOPHEN 325 MG/1
650 TABLET ORAL EVERY 6 HOURS
Status: DISCONTINUED | OUTPATIENT
Start: 2021-06-29 | End: 2021-07-01 | Stop reason: HOSPADM

## 2021-06-29 RX ORDER — MAG HYDROX/ALUMINUM HYD/SIMETH 200-200-20
30 SUSPENSION, ORAL (FINAL DOSE FORM) ORAL EVERY 4 HOURS PRN
Status: DISCONTINUED | OUTPATIENT
Start: 2021-06-29 | End: 2021-07-01 | Stop reason: HOSPADM

## 2021-06-29 RX ORDER — PROPOFOL 10 MG/ML
VIAL (ML) INTRAVENOUS
Status: DISCONTINUED | OUTPATIENT
Start: 2021-06-29 | End: 2021-06-29

## 2021-06-29 RX ORDER — KETAMINE HCL IN 0.9 % NACL 50 MG/5 ML
SYRINGE (ML) INTRAVENOUS
Status: DISCONTINUED | OUTPATIENT
Start: 2021-06-29 | End: 2021-06-29

## 2021-06-29 RX ORDER — CEFAZOLIN SODIUM 1 G/3ML
2 INJECTION, POWDER, FOR SOLUTION INTRAMUSCULAR; INTRAVENOUS
Status: COMPLETED | OUTPATIENT
Start: 2021-06-29 | End: 2021-06-30

## 2021-06-29 RX ORDER — VANCOMYCIN HYDROCHLORIDE 1 G/20ML
INJECTION, POWDER, LYOPHILIZED, FOR SOLUTION INTRAVENOUS
Status: DISCONTINUED | OUTPATIENT
Start: 2021-06-29 | End: 2021-06-29 | Stop reason: HOSPADM

## 2021-06-29 RX ORDER — HYDROMORPHONE HYDROCHLORIDE 1 MG/ML
0.2 INJECTION, SOLUTION INTRAMUSCULAR; INTRAVENOUS; SUBCUTANEOUS EVERY 5 MIN PRN
Status: COMPLETED | OUTPATIENT
Start: 2021-06-29 | End: 2021-06-29

## 2021-06-29 RX ORDER — LABETALOL HCL 20 MG/4 ML
10 SYRINGE (ML) INTRAVENOUS EVERY 6 HOURS PRN
Status: DISCONTINUED | OUTPATIENT
Start: 2021-06-29 | End: 2021-07-01 | Stop reason: HOSPADM

## 2021-06-29 RX ORDER — ROCURONIUM BROMIDE 10 MG/ML
INJECTION, SOLUTION INTRAVENOUS
Status: DISCONTINUED | OUTPATIENT
Start: 2021-06-29 | End: 2021-06-29

## 2021-06-29 RX ORDER — MIDAZOLAM HYDROCHLORIDE 1 MG/ML
INJECTION, SOLUTION INTRAMUSCULAR; INTRAVENOUS
Status: DISCONTINUED | OUTPATIENT
Start: 2021-06-29 | End: 2021-06-29

## 2021-06-29 RX ADMIN — HYDROMORPHONE HYDROCHLORIDE 0.2 MG: 1 INJECTION, SOLUTION INTRAMUSCULAR; INTRAVENOUS; SUBCUTANEOUS at 12:06

## 2021-06-29 RX ADMIN — HYDROMORPHONE HYDROCHLORIDE 0.2 MG: 1 INJECTION, SOLUTION INTRAMUSCULAR; INTRAVENOUS; SUBCUTANEOUS at 11:06

## 2021-06-29 RX ADMIN — ACETAMINOPHEN 650 MG: 325 TABLET ORAL at 05:06

## 2021-06-29 RX ADMIN — HYDROMORPHONE HYDROCHLORIDE 2 MG: 1 INJECTION, SOLUTION INTRAMUSCULAR; INTRAVENOUS; SUBCUTANEOUS at 03:06

## 2021-06-29 RX ADMIN — Medication 5 MG: at 08:06

## 2021-06-29 RX ADMIN — DEXAMETHASONE SODIUM PHOSPHATE 8 MG: 4 INJECTION, SOLUTION INTRAMUSCULAR; INTRAVENOUS at 08:06

## 2021-06-29 RX ADMIN — ONDANSETRON 8 MG: 8 TABLET, ORALLY DISINTEGRATING ORAL at 12:06

## 2021-06-29 RX ADMIN — PROMETHAZINE HYDROCHLORIDE 25 MG: 25 TABLET ORAL at 09:06

## 2021-06-29 RX ADMIN — FENTANYL CITRATE 25 MCG: 50 INJECTION, SOLUTION INTRAMUSCULAR; INTRAVENOUS at 08:06

## 2021-06-29 RX ADMIN — Medication 10 MG: at 07:06

## 2021-06-29 RX ADMIN — CEFAZOLIN 2 G: 330 INJECTION, POWDER, FOR SOLUTION INTRAMUSCULAR; INTRAVENOUS at 07:06

## 2021-06-29 RX ADMIN — PROPOFOL 100 MG: 10 INJECTION, EMULSION INTRAVENOUS at 07:06

## 2021-06-29 RX ADMIN — ROCURONIUM BROMIDE 5 MG: 10 INJECTION, SOLUTION INTRAVENOUS at 07:06

## 2021-06-29 RX ADMIN — ONDANSETRON 4 MG: 2 INJECTION INTRAMUSCULAR; INTRAVENOUS at 10:06

## 2021-06-29 RX ADMIN — OXYCODONE 5 MG: 5 TABLET ORAL at 09:06

## 2021-06-29 RX ADMIN — ACETAMINOPHEN 1000 MG: 10 INJECTION, SOLUTION INTRAVENOUS at 10:06

## 2021-06-29 RX ADMIN — MIDAZOLAM HYDROCHLORIDE 1 MG: 1 INJECTION, SOLUTION INTRAMUSCULAR; INTRAVENOUS at 06:06

## 2021-06-29 RX ADMIN — CEFAZOLIN 2 G: 330 INJECTION, POWDER, FOR SOLUTION INTRAMUSCULAR; INTRAVENOUS at 04:06

## 2021-06-29 RX ADMIN — DOCUSATE SODIUM 50MG AND SENNOSIDES 8.6MG 2 TABLET: 8.6; 5 TABLET, FILM COATED ORAL at 09:06

## 2021-06-29 RX ADMIN — ONDANSETRON 8 MG: 8 TABLET, ORALLY DISINTEGRATING ORAL at 06:06

## 2021-06-29 RX ADMIN — DIPHENHYDRAMINE HYDROCHLORIDE 25 MG: 25 CAPSULE ORAL at 06:06

## 2021-06-29 RX ADMIN — MUPIROCIN: 20 OINTMENT TOPICAL at 05:06

## 2021-06-29 RX ADMIN — SUCCINYLCHOLINE CHLORIDE 100 MG: 20 INJECTION, SOLUTION INTRAMUSCULAR; INTRAVENOUS at 07:06

## 2021-06-29 RX ADMIN — FENTANYL CITRATE 25 MCG: 50 INJECTION, SOLUTION INTRAMUSCULAR; INTRAVENOUS at 07:06

## 2021-06-29 RX ADMIN — FENTANYL CITRATE 50 MCG: 50 INJECTION, SOLUTION INTRAMUSCULAR; INTRAVENOUS at 07:06

## 2021-06-29 RX ADMIN — VERAPAMIL HYDROCHLORIDE 120 MG: 40 TABLET ORAL at 09:06

## 2021-06-29 RX ADMIN — FENTANYL CITRATE 50 MCG: 50 INJECTION, SOLUTION INTRAMUSCULAR; INTRAVENOUS at 11:06

## 2021-06-29 RX ADMIN — FENTANYL CITRATE 50 MCG: 50 INJECTION, SOLUTION INTRAMUSCULAR; INTRAVENOUS at 10:06

## 2021-06-29 RX ADMIN — PROCHLORPERAZINE EDISYLATE 5 MG: 5 INJECTION INTRAMUSCULAR; INTRAVENOUS at 06:06

## 2021-06-29 RX ADMIN — SODIUM CHLORIDE: 0.9 INJECTION, SOLUTION INTRAVENOUS at 06:06

## 2021-06-29 RX ADMIN — LIDOCAINE HYDROCHLORIDE 100 MG: 20 INJECTION, SOLUTION INTRAVENOUS at 07:06

## 2021-06-29 RX ADMIN — DIPHENHYDRAMINE HYDROCHLORIDE 25 MG: 25 CAPSULE ORAL at 11:06

## 2021-06-29 RX ADMIN — PROCHLORPERAZINE EDISYLATE 5 MG: 5 INJECTION INTRAMUSCULAR; INTRAVENOUS at 12:06

## 2021-06-30 ENCOUNTER — TELEPHONE (OUTPATIENT)
Dept: NEUROSURGERY | Facility: CLINIC | Age: 86
End: 2021-06-30

## 2021-06-30 DIAGNOSIS — Z98.1 S/P LUMBAR FUSION: ICD-10-CM

## 2021-06-30 DIAGNOSIS — M47.816 LUMBAR SPONDYLOSIS: Primary | ICD-10-CM

## 2021-06-30 DIAGNOSIS — M43.16 SPONDYLOLISTHESIS AT L4-L5 LEVEL: ICD-10-CM

## 2021-06-30 LAB
ALBUMIN SERPL BCP-MCNC: 3.4 G/DL (ref 3.5–5.2)
ALP SERPL-CCNC: 94 U/L (ref 55–135)
ALT SERPL W/O P-5'-P-CCNC: 35 U/L (ref 10–44)
ANION GAP SERPL CALC-SCNC: 9 MMOL/L (ref 8–16)
AST SERPL-CCNC: 36 U/L (ref 10–40)
BASOPHILS # BLD AUTO: 0.02 K/UL (ref 0–0.2)
BASOPHILS NFR BLD: 0.1 % (ref 0–1.9)
BILIRUB SERPL-MCNC: 0.3 MG/DL (ref 0.1–1)
BUN SERPL-MCNC: 16 MG/DL (ref 8–23)
CALCIUM SERPL-MCNC: 9 MG/DL (ref 8.7–10.5)
CHLORIDE SERPL-SCNC: 103 MMOL/L (ref 95–110)
CO2 SERPL-SCNC: 25 MMOL/L (ref 23–29)
CREAT SERPL-MCNC: 0.8 MG/DL (ref 0.5–1.4)
DIFFERENTIAL METHOD: ABNORMAL
EOSINOPHIL # BLD AUTO: 0 K/UL (ref 0–0.5)
EOSINOPHIL NFR BLD: 0 % (ref 0–8)
ERYTHROCYTE [DISTWIDTH] IN BLOOD BY AUTOMATED COUNT: 13.2 % (ref 11.5–14.5)
EST. GFR  (AFRICAN AMERICAN): >60 ML/MIN/1.73 M^2
EST. GFR  (NON AFRICAN AMERICAN): >60 ML/MIN/1.73 M^2
GLUCOSE SERPL-MCNC: 135 MG/DL (ref 70–110)
HCT VFR BLD AUTO: 36.8 % (ref 37–48.5)
HGB BLD-MCNC: 12.3 G/DL (ref 12–16)
IMM GRANULOCYTES # BLD AUTO: 0.05 K/UL (ref 0–0.04)
IMM GRANULOCYTES NFR BLD AUTO: 0.3 % (ref 0–0.5)
LYMPHOCYTES # BLD AUTO: 0.6 K/UL (ref 1–4.8)
LYMPHOCYTES NFR BLD: 3.9 % (ref 18–48)
MCH RBC QN AUTO: 31.1 PG (ref 27–31)
MCHC RBC AUTO-ENTMCNC: 33.4 G/DL (ref 32–36)
MCV RBC AUTO: 93 FL (ref 82–98)
MONOCYTES # BLD AUTO: 1.5 K/UL (ref 0.3–1)
MONOCYTES NFR BLD: 9.8 % (ref 4–15)
NEUTROPHILS # BLD AUTO: 13.3 K/UL (ref 1.8–7.7)
NEUTROPHILS NFR BLD: 85.9 % (ref 38–73)
NRBC BLD-RTO: 0 /100 WBC
PLATELET # BLD AUTO: 298 K/UL (ref 150–450)
PMV BLD AUTO: 9.2 FL (ref 9.2–12.9)
POTASSIUM SERPL-SCNC: 4.3 MMOL/L (ref 3.5–5.1)
PROT SERPL-MCNC: 6.5 G/DL (ref 6–8.4)
RBC # BLD AUTO: 3.95 M/UL (ref 4–5.4)
SODIUM SERPL-SCNC: 137 MMOL/L (ref 136–145)
WBC # BLD AUTO: 15.45 K/UL (ref 3.9–12.7)

## 2021-06-30 PROCEDURE — 97116 GAIT TRAINING THERAPY: CPT

## 2021-06-30 PROCEDURE — 63600175 PHARM REV CODE 636 W HCPCS: Performed by: STUDENT IN AN ORGANIZED HEALTH CARE EDUCATION/TRAINING PROGRAM

## 2021-06-30 PROCEDURE — 97161 PT EVAL LOW COMPLEX 20 MIN: CPT

## 2021-06-30 PROCEDURE — 63600175 PHARM REV CODE 636 W HCPCS: Performed by: PHYSICIAN ASSISTANT

## 2021-06-30 PROCEDURE — 25000003 PHARM REV CODE 250: Performed by: STUDENT IN AN ORGANIZED HEALTH CARE EDUCATION/TRAINING PROGRAM

## 2021-06-30 PROCEDURE — 97535 SELF CARE MNGMENT TRAINING: CPT

## 2021-06-30 PROCEDURE — 36415 COLL VENOUS BLD VENIPUNCTURE: CPT | Performed by: PHYSICIAN ASSISTANT

## 2021-06-30 PROCEDURE — 80053 COMPREHEN METABOLIC PANEL: CPT | Performed by: PHYSICIAN ASSISTANT

## 2021-06-30 PROCEDURE — 25000003 PHARM REV CODE 250: Performed by: PHYSICIAN ASSISTANT

## 2021-06-30 PROCEDURE — 99024 POSTOP FOLLOW-UP VISIT: CPT | Mod: ,,, | Performed by: PHYSICIAN ASSISTANT

## 2021-06-30 PROCEDURE — 85025 COMPLETE CBC W/AUTO DIFF WBC: CPT | Performed by: PHYSICIAN ASSISTANT

## 2021-06-30 PROCEDURE — 97165 OT EVAL LOW COMPLEX 30 MIN: CPT

## 2021-06-30 PROCEDURE — 99024 PR POST-OP FOLLOW-UP VISIT: ICD-10-PCS | Mod: ,,, | Performed by: PHYSICIAN ASSISTANT

## 2021-06-30 PROCEDURE — 11000001 HC ACUTE MED/SURG PRIVATE ROOM

## 2021-06-30 RX ORDER — HEPARIN SODIUM 5000 [USP'U]/ML
5000 INJECTION, SOLUTION INTRAVENOUS; SUBCUTANEOUS EVERY 8 HOURS
Status: DISCONTINUED | OUTPATIENT
Start: 2021-06-30 | End: 2021-07-01 | Stop reason: HOSPADM

## 2021-06-30 RX ADMIN — VERAPAMIL HYDROCHLORIDE 120 MG: 40 TABLET ORAL at 09:06

## 2021-06-30 RX ADMIN — HYDROCHLOROTHIAZIDE 12.5 MG: 12.5 TABLET ORAL at 09:06

## 2021-06-30 RX ADMIN — POLYETHYLENE GLYCOL 3350 17 G: 17 POWDER, FOR SOLUTION ORAL at 09:06

## 2021-06-30 RX ADMIN — ACETAMINOPHEN 650 MG: 325 TABLET ORAL at 06:06

## 2021-06-30 RX ADMIN — ACETAMINOPHEN 650 MG: 325 TABLET ORAL at 11:06

## 2021-06-30 RX ADMIN — ACETAMINOPHEN 650 MG: 325 TABLET ORAL at 05:06

## 2021-06-30 RX ADMIN — CEFAZOLIN 2 G: 330 INJECTION, POWDER, FOR SOLUTION INTRAMUSCULAR; INTRAVENOUS at 12:06

## 2021-06-30 RX ADMIN — OXYCODONE 5 MG: 5 TABLET ORAL at 09:06

## 2021-06-30 RX ADMIN — VERAPAMIL HYDROCHLORIDE 120 MG: 40 TABLET ORAL at 01:06

## 2021-06-30 RX ADMIN — MUPIROCIN: 20 OINTMENT TOPICAL at 09:06

## 2021-06-30 RX ADMIN — HEPARIN SODIUM 5000 UNITS: 5000 INJECTION INTRAVENOUS; SUBCUTANEOUS at 01:06

## 2021-06-30 RX ADMIN — HEPARIN SODIUM 5000 UNITS: 5000 INJECTION INTRAVENOUS; SUBCUTANEOUS at 09:06

## 2021-06-30 RX ADMIN — DOCUSATE SODIUM 50MG AND SENNOSIDES 8.6MG 2 TABLET: 8.6; 5 TABLET, FILM COATED ORAL at 09:06

## 2021-06-30 RX ADMIN — OXYCODONE 5 MG: 5 TABLET ORAL at 10:06

## 2021-06-30 RX ADMIN — ACETAMINOPHEN 650 MG: 325 TABLET ORAL at 12:06

## 2021-07-01 VITALS
TEMPERATURE: 98 F | BODY MASS INDEX: 28.55 KG/M2 | OXYGEN SATURATION: 90 % | HEART RATE: 75 BPM | HEIGHT: 58 IN | RESPIRATION RATE: 14 BRPM | WEIGHT: 136 LBS | SYSTOLIC BLOOD PRESSURE: 148 MMHG | DIASTOLIC BLOOD PRESSURE: 69 MMHG

## 2021-07-01 PROCEDURE — 25000003 PHARM REV CODE 250: Performed by: PHYSICIAN ASSISTANT

## 2021-07-01 PROCEDURE — 99239 HOSP IP/OBS DSCHRG MGMT >30: CPT | Mod: ,,, | Performed by: PHYSICIAN ASSISTANT

## 2021-07-01 PROCEDURE — 99239 PR HOSPITAL DISCHARGE DAY,>30 MIN: ICD-10-PCS | Mod: ,,, | Performed by: PHYSICIAN ASSISTANT

## 2021-07-01 PROCEDURE — 25000003 PHARM REV CODE 250: Performed by: STUDENT IN AN ORGANIZED HEALTH CARE EDUCATION/TRAINING PROGRAM

## 2021-07-01 PROCEDURE — 1111F DSCHRG MED/CURRENT MED MERGE: CPT | Mod: CPTII,,, | Performed by: PHYSICIAN ASSISTANT

## 2021-07-01 PROCEDURE — 1111F PR DISCHARGE MEDS RECONCILED W/ CURRENT OUTPATIENT MED LIST: ICD-10-PCS | Mod: CPTII,,, | Performed by: PHYSICIAN ASSISTANT

## 2021-07-01 PROCEDURE — 63600175 PHARM REV CODE 636 W HCPCS: Performed by: PHYSICIAN ASSISTANT

## 2021-07-01 RX ORDER — OXYCODONE AND ACETAMINOPHEN 5; 325 MG/1; MG/1
1 TABLET ORAL EVERY 4 HOURS PRN
Qty: 60 TABLET | Refills: 0 | Status: SHIPPED | OUTPATIENT
Start: 2021-07-01 | End: 2021-07-15 | Stop reason: SDUPTHER

## 2021-07-01 RX ORDER — METHOCARBAMOL 500 MG/1
500 TABLET, FILM COATED ORAL 4 TIMES DAILY PRN
Qty: 60 TABLET | Refills: 0 | Status: SHIPPED | OUTPATIENT
Start: 2021-07-01 | End: 2021-07-15 | Stop reason: SDUPTHER

## 2021-07-01 RX ADMIN — POLYETHYLENE GLYCOL 3350 17 G: 17 POWDER, FOR SOLUTION ORAL at 09:07

## 2021-07-01 RX ADMIN — VERAPAMIL HYDROCHLORIDE 120 MG: 40 TABLET ORAL at 09:07

## 2021-07-01 RX ADMIN — OXYCODONE 5 MG: 5 TABLET ORAL at 05:07

## 2021-07-01 RX ADMIN — DOCUSATE SODIUM 50MG AND SENNOSIDES 8.6MG 2 TABLET: 8.6; 5 TABLET, FILM COATED ORAL at 09:07

## 2021-07-01 RX ADMIN — HYDROCHLOROTHIAZIDE 12.5 MG: 12.5 TABLET ORAL at 09:07

## 2021-07-01 RX ADMIN — OXYCODONE 5 MG: 5 TABLET ORAL at 09:07

## 2021-07-01 RX ADMIN — HEPARIN SODIUM 5000 UNITS: 5000 INJECTION INTRAVENOUS; SUBCUTANEOUS at 05:07

## 2021-07-01 RX ADMIN — ACETAMINOPHEN 650 MG: 325 TABLET ORAL at 05:07

## 2021-07-02 PROCEDURE — G0180 MD CERTIFICATION HHA PATIENT: HCPCS | Mod: ,,, | Performed by: NEUROLOGICAL SURGERY

## 2021-07-02 PROCEDURE — G0180 PR HOME HEALTH MD CERTIFICATION: ICD-10-PCS | Mod: ,,, | Performed by: NEUROLOGICAL SURGERY

## 2021-07-03 LAB
BLD PROD TYP BPU: NORMAL
BLD PROD TYP BPU: NORMAL
BLOOD UNIT EXPIRATION DATE: NORMAL
BLOOD UNIT EXPIRATION DATE: NORMAL
BLOOD UNIT TYPE CODE: 5100
BLOOD UNIT TYPE CODE: 5100
BLOOD UNIT TYPE: NORMAL
BLOOD UNIT TYPE: NORMAL
CODING SYSTEM: NORMAL
CODING SYSTEM: NORMAL
DISPENSE STATUS: NORMAL
DISPENSE STATUS: NORMAL
NUM UNITS TRANS PACKED RBC: NORMAL
NUM UNITS TRANS PACKED RBC: NORMAL

## 2021-07-08 ENCOUNTER — OFFICE VISIT (OUTPATIENT)
Dept: FAMILY MEDICINE | Facility: CLINIC | Age: 86
End: 2021-07-08
Payer: MEDICARE

## 2021-07-08 VITALS
BODY MASS INDEX: 28.72 KG/M2 | DIASTOLIC BLOOD PRESSURE: 67 MMHG | HEART RATE: 81 BPM | WEIGHT: 136.81 LBS | TEMPERATURE: 98 F | RESPIRATION RATE: 20 BRPM | OXYGEN SATURATION: 99 % | SYSTOLIC BLOOD PRESSURE: 122 MMHG | HEIGHT: 58 IN

## 2021-07-08 DIAGNOSIS — Z98.1 S/P LUMBAR FUSION: ICD-10-CM

## 2021-07-08 DIAGNOSIS — Z09 HOSPITAL DISCHARGE FOLLOW-UP: Primary | ICD-10-CM

## 2021-07-08 PROCEDURE — 1125F AMNT PAIN NOTED PAIN PRSNT: CPT | Mod: S$GLB,,, | Performed by: NURSE PRACTITIONER

## 2021-07-08 PROCEDURE — 3288F PR FALLS RISK ASSESSMENT DOCUMENTED: ICD-10-PCS | Mod: CPTII,S$GLB,, | Performed by: NURSE PRACTITIONER

## 2021-07-08 PROCEDURE — 1101F PT FALLS ASSESS-DOCD LE1/YR: CPT | Mod: CPTII,S$GLB,, | Performed by: NURSE PRACTITIONER

## 2021-07-08 PROCEDURE — 99213 PR OFFICE/OUTPT VISIT, EST, LEVL III, 20-29 MIN: ICD-10-PCS | Mod: S$GLB,,, | Performed by: NURSE PRACTITIONER

## 2021-07-08 PROCEDURE — 1101F PR PT FALLS ASSESS DOC 0-1 FALLS W/OUT INJ PAST YR: ICD-10-PCS | Mod: CPTII,S$GLB,, | Performed by: NURSE PRACTITIONER

## 2021-07-08 PROCEDURE — 3288F FALL RISK ASSESSMENT DOCD: CPT | Mod: CPTII,S$GLB,, | Performed by: NURSE PRACTITIONER

## 2021-07-08 PROCEDURE — 99999 PR PBB SHADOW E&M-EST. PATIENT-LVL IV: CPT | Mod: PBBFAC,,, | Performed by: NURSE PRACTITIONER

## 2021-07-08 PROCEDURE — 99999 PR PBB SHADOW E&M-EST. PATIENT-LVL IV: ICD-10-PCS | Mod: PBBFAC,,, | Performed by: NURSE PRACTITIONER

## 2021-07-08 PROCEDURE — 99213 OFFICE O/P EST LOW 20 MIN: CPT | Mod: S$GLB,,, | Performed by: NURSE PRACTITIONER

## 2021-07-08 PROCEDURE — 1125F PR PAIN SEVERITY QUANTIFIED, PAIN PRESENT: ICD-10-PCS | Mod: S$GLB,,, | Performed by: NURSE PRACTITIONER

## 2021-07-08 RX ORDER — HYDROCHLOROTHIAZIDE 12.5 MG/1
CAPSULE ORAL
Qty: 90 CAPSULE | Refills: 1 | Status: SHIPPED | OUTPATIENT
Start: 2021-07-08 | End: 2022-04-20

## 2021-07-15 ENCOUNTER — CLINICAL SUPPORT (OUTPATIENT)
Dept: NEUROSURGERY | Facility: CLINIC | Age: 86
End: 2021-07-15
Payer: MEDICARE

## 2021-07-15 RX ORDER — OXYCODONE AND ACETAMINOPHEN 5; 325 MG/1; MG/1
1 TABLET ORAL EVERY 6 HOURS PRN
Qty: 60 TABLET | Refills: 0 | Status: SHIPPED | OUTPATIENT
Start: 2021-07-15 | End: 2021-12-17

## 2021-07-15 RX ORDER — METHOCARBAMOL 500 MG/1
500 TABLET, FILM COATED ORAL 4 TIMES DAILY PRN
Qty: 60 TABLET | Refills: 0 | Status: SHIPPED | OUTPATIENT
Start: 2021-07-15 | End: 2021-08-27

## 2021-07-16 ENCOUNTER — EXTERNAL HOME HEALTH (OUTPATIENT)
Dept: HOME HEALTH SERVICES | Facility: HOSPITAL | Age: 86
End: 2021-07-16
Payer: MEDICARE

## 2021-07-16 VITALS — TEMPERATURE: 98 F

## 2021-08-04 ENCOUNTER — TELEPHONE (OUTPATIENT)
Dept: FAMILY MEDICINE | Facility: CLINIC | Age: 86
End: 2021-08-04

## 2021-08-23 ENCOUNTER — OFFICE VISIT (OUTPATIENT)
Dept: FAMILY MEDICINE | Facility: CLINIC | Age: 86
End: 2021-08-23
Payer: MEDICARE

## 2021-08-23 VITALS
HEIGHT: 58 IN | WEIGHT: 133 LBS | BODY MASS INDEX: 27.92 KG/M2 | DIASTOLIC BLOOD PRESSURE: 72 MMHG | HEART RATE: 78 BPM | SYSTOLIC BLOOD PRESSURE: 139 MMHG | TEMPERATURE: 98 F

## 2021-08-23 DIAGNOSIS — M81.0 AGE-RELATED OSTEOPOROSIS WITHOUT CURRENT PATHOLOGICAL FRACTURE: ICD-10-CM

## 2021-08-23 DIAGNOSIS — E78.2 MIXED HYPERLIPIDEMIA: ICD-10-CM

## 2021-08-23 DIAGNOSIS — R41.9 UNSPECIFIED SYMPTOMS AND SIGNS INVOLVING COGNITIVE FUNCTIONS AND AWARENESS: ICD-10-CM

## 2021-08-23 DIAGNOSIS — G31.84 MILD COGNITIVE IMPAIRMENT WITH MEMORY LOSS: ICD-10-CM

## 2021-08-23 DIAGNOSIS — I10 ESSENTIAL HYPERTENSION: Primary | ICD-10-CM

## 2021-08-23 DIAGNOSIS — M54.16 LUMBAR RADICULOPATHY: ICD-10-CM

## 2021-08-23 PROCEDURE — 1101F PT FALLS ASSESS-DOCD LE1/YR: CPT | Mod: CPTII,S$GLB,, | Performed by: INTERNAL MEDICINE

## 2021-08-23 PROCEDURE — 1101F PR PT FALLS ASSESS DOC 0-1 FALLS W/OUT INJ PAST YR: ICD-10-PCS | Mod: CPTII,S$GLB,, | Performed by: INTERNAL MEDICINE

## 2021-08-23 PROCEDURE — 99499 UNLISTED E&M SERVICE: CPT | Mod: HCNC,S$GLB,, | Performed by: INTERNAL MEDICINE

## 2021-08-23 PROCEDURE — 99499 RISK ADDL DX/OHS AUDIT: ICD-10-PCS | Mod: HCNC,S$GLB,, | Performed by: INTERNAL MEDICINE

## 2021-08-23 PROCEDURE — 1159F PR MEDICATION LIST DOCUMENTED IN MEDICAL RECORD: ICD-10-PCS | Mod: CPTII,S$GLB,, | Performed by: INTERNAL MEDICINE

## 2021-08-23 PROCEDURE — 99999 PR PBB SHADOW E&M-EST. PATIENT-LVL III: CPT | Mod: PBBFAC,,, | Performed by: INTERNAL MEDICINE

## 2021-08-23 PROCEDURE — 3288F FALL RISK ASSESSMENT DOCD: CPT | Mod: CPTII,S$GLB,, | Performed by: INTERNAL MEDICINE

## 2021-08-23 PROCEDURE — 99214 PR OFFICE/OUTPT VISIT, EST, LEVL IV, 30-39 MIN: ICD-10-PCS | Mod: S$GLB,,, | Performed by: INTERNAL MEDICINE

## 2021-08-23 PROCEDURE — 1159F MED LIST DOCD IN RCRD: CPT | Mod: CPTII,S$GLB,, | Performed by: INTERNAL MEDICINE

## 2021-08-23 PROCEDURE — 99214 OFFICE O/P EST MOD 30 MIN: CPT | Mod: S$GLB,,, | Performed by: INTERNAL MEDICINE

## 2021-08-23 PROCEDURE — 3288F PR FALLS RISK ASSESSMENT DOCUMENTED: ICD-10-PCS | Mod: CPTII,S$GLB,, | Performed by: INTERNAL MEDICINE

## 2021-08-23 PROCEDURE — 99999 PR PBB SHADOW E&M-EST. PATIENT-LVL III: ICD-10-PCS | Mod: PBBFAC,,, | Performed by: INTERNAL MEDICINE

## 2021-08-23 PROCEDURE — 1126F AMNT PAIN NOTED NONE PRSNT: CPT | Mod: CPTII,S$GLB,, | Performed by: INTERNAL MEDICINE

## 2021-08-23 PROCEDURE — 1126F PR PAIN SEVERITY QUANTIFIED, NO PAIN PRESENT: ICD-10-PCS | Mod: CPTII,S$GLB,, | Performed by: INTERNAL MEDICINE

## 2021-09-03 ENCOUNTER — PATIENT MESSAGE (OUTPATIENT)
Dept: NEUROSURGERY | Facility: CLINIC | Age: 86
End: 2021-09-03

## 2021-09-07 ENCOUNTER — DOCUMENT SCAN (OUTPATIENT)
Dept: HOME HEALTH SERVICES | Facility: HOSPITAL | Age: 86
End: 2021-09-07
Payer: MEDICARE

## 2021-09-13 ENCOUNTER — HOSPITAL ENCOUNTER (OUTPATIENT)
Dept: RADIOLOGY | Facility: HOSPITAL | Age: 86
Discharge: HOME | End: 2021-09-13
Attending: NEUROLOGICAL SURGERY
Payer: MEDICARE

## 2021-09-13 ENCOUNTER — OFFICE VISIT (OUTPATIENT)
Dept: NEUROSURGERY | Facility: CLINIC | Age: 86
End: 2021-09-13
Payer: MEDICARE

## 2021-09-13 VITALS
DIASTOLIC BLOOD PRESSURE: 74 MMHG | HEART RATE: 80 BPM | WEIGHT: 133 LBS | TEMPERATURE: 98 F | SYSTOLIC BLOOD PRESSURE: 150 MMHG | HEIGHT: 58 IN | BODY MASS INDEX: 27.92 KG/M2

## 2021-09-13 DIAGNOSIS — M43.16 SPONDYLOLISTHESIS AT L4-L5 LEVEL: Primary | ICD-10-CM

## 2021-09-13 DIAGNOSIS — M43.16 SPONDYLOLISTHESIS AT L4-L5 LEVEL: ICD-10-CM

## 2021-09-13 DIAGNOSIS — Z98.1 S/P LUMBAR FUSION: ICD-10-CM

## 2021-09-13 DIAGNOSIS — M47.816 LUMBAR SPONDYLOSIS: ICD-10-CM

## 2021-09-13 DIAGNOSIS — M43.27 FUSION OF SPINE, LUMBOSACRAL REGION: ICD-10-CM

## 2021-09-13 PROCEDURE — 3288F PR FALLS RISK ASSESSMENT DOCUMENTED: ICD-10-PCS | Mod: CPTII,S$GLB,, | Performed by: NEUROLOGICAL SURGERY

## 2021-09-13 PROCEDURE — 3288F FALL RISK ASSESSMENT DOCD: CPT | Mod: CPTII,S$GLB,, | Performed by: NEUROLOGICAL SURGERY

## 2021-09-13 PROCEDURE — 1125F AMNT PAIN NOTED PAIN PRSNT: CPT | Mod: CPTII,S$GLB,, | Performed by: NEUROLOGICAL SURGERY

## 2021-09-13 PROCEDURE — 1101F PT FALLS ASSESS-DOCD LE1/YR: CPT | Mod: CPTII,S$GLB,, | Performed by: NEUROLOGICAL SURGERY

## 2021-09-13 PROCEDURE — 1159F PR MEDICATION LIST DOCUMENTED IN MEDICAL RECORD: ICD-10-PCS | Mod: CPTII,S$GLB,, | Performed by: NEUROLOGICAL SURGERY

## 2021-09-13 PROCEDURE — 1159F MED LIST DOCD IN RCRD: CPT | Mod: CPTII,S$GLB,, | Performed by: NEUROLOGICAL SURGERY

## 2021-09-13 PROCEDURE — 1101F PR PT FALLS ASSESS DOC 0-1 FALLS W/OUT INJ PAST YR: ICD-10-PCS | Mod: CPTII,S$GLB,, | Performed by: NEUROLOGICAL SURGERY

## 2021-09-13 PROCEDURE — 1125F PR PAIN SEVERITY QUANTIFIED, PAIN PRESENT: ICD-10-PCS | Mod: CPTII,S$GLB,, | Performed by: NEUROLOGICAL SURGERY

## 2021-09-13 PROCEDURE — 72100 XR LUMBAR SPINE AP AND LATERAL: ICD-10-PCS | Mod: 26,,, | Performed by: RADIOLOGY

## 2021-09-13 PROCEDURE — 1160F RVW MEDS BY RX/DR IN RCRD: CPT | Mod: CPTII,S$GLB,, | Performed by: NEUROLOGICAL SURGERY

## 2021-09-13 PROCEDURE — 72100 X-RAY EXAM L-S SPINE 2/3 VWS: CPT | Mod: 26,,, | Performed by: RADIOLOGY

## 2021-09-13 PROCEDURE — 99999 PR PBB SHADOW E&M-EST. PATIENT-LVL III: ICD-10-PCS | Mod: PBBFAC,,, | Performed by: NEUROLOGICAL SURGERY

## 2021-09-13 PROCEDURE — 99024 POSTOP FOLLOW-UP VISIT: CPT | Mod: S$GLB,,, | Performed by: NEUROLOGICAL SURGERY

## 2021-09-13 PROCEDURE — 99999 PR PBB SHADOW E&M-EST. PATIENT-LVL III: CPT | Mod: PBBFAC,,, | Performed by: NEUROLOGICAL SURGERY

## 2021-09-13 PROCEDURE — 1160F PR REVIEW ALL MEDS BY PRESCRIBER/CLIN PHARMACIST DOCUMENTED: ICD-10-PCS | Mod: CPTII,S$GLB,, | Performed by: NEUROLOGICAL SURGERY

## 2021-09-13 PROCEDURE — 72100 X-RAY EXAM L-S SPINE 2/3 VWS: CPT | Mod: TC

## 2021-09-13 PROCEDURE — 99024 PR POST-OP FOLLOW-UP VISIT: ICD-10-PCS | Mod: S$GLB,,, | Performed by: NEUROLOGICAL SURGERY

## 2021-09-23 ENCOUNTER — LAB VISIT (OUTPATIENT)
Dept: LAB | Facility: HOSPITAL | Age: 86
End: 2021-09-23
Attending: INTERNAL MEDICINE
Payer: MEDICARE

## 2021-09-23 DIAGNOSIS — I10 ESSENTIAL HYPERTENSION: Chronic | ICD-10-CM

## 2021-09-23 DIAGNOSIS — R41.9 UNSPECIFIED SYMPTOMS AND SIGNS INVOLVING COGNITIVE FUNCTIONS AND AWARENESS: ICD-10-CM

## 2021-09-23 DIAGNOSIS — G31.84 MILD COGNITIVE IMPAIRMENT WITH MEMORY LOSS: ICD-10-CM

## 2021-09-23 DIAGNOSIS — I70.0 ARTERIOSCLEROSIS OF AORTA: ICD-10-CM

## 2021-09-23 DIAGNOSIS — E78.2 MIXED HYPERLIPIDEMIA: Chronic | ICD-10-CM

## 2021-09-23 DIAGNOSIS — M81.0 AGE-RELATED OSTEOPOROSIS WITHOUT CURRENT PATHOLOGICAL FRACTURE: ICD-10-CM

## 2021-09-23 LAB
25(OH)D3+25(OH)D2 SERPL-MCNC: 42 NG/ML (ref 30–96)
ALBUMIN SERPL BCP-MCNC: 3.9 G/DL (ref 3.5–5.2)
ALP SERPL-CCNC: 137 U/L (ref 55–135)
ALT SERPL W/O P-5'-P-CCNC: 13 U/L (ref 10–44)
ANION GAP SERPL CALC-SCNC: 13 MMOL/L (ref 8–16)
AST SERPL-CCNC: 19 U/L (ref 10–40)
BILIRUB SERPL-MCNC: 0.7 MG/DL (ref 0.1–1)
BUN SERPL-MCNC: 13 MG/DL (ref 8–23)
CALCIUM SERPL-MCNC: 10.3 MG/DL (ref 8.7–10.5)
CHLORIDE SERPL-SCNC: 102 MMOL/L (ref 95–110)
CHOLEST SERPL-MCNC: 259 MG/DL (ref 120–199)
CHOLEST/HDLC SERPL: 4 {RATIO} (ref 2–5)
CO2 SERPL-SCNC: 24 MMOL/L (ref 23–29)
CREAT SERPL-MCNC: 0.7 MG/DL (ref 0.5–1.4)
CRP SERPL-MCNC: 10.6 MG/L (ref 0–8.2)
ERYTHROCYTE [SEDIMENTATION RATE] IN BLOOD BY WESTERGREN METHOD: 11 MM/HR (ref 0–20)
EST. GFR  (AFRICAN AMERICAN): >60 ML/MIN/1.73 M^2
EST. GFR  (NON AFRICAN AMERICAN): >60 ML/MIN/1.73 M^2
GLUCOSE SERPL-MCNC: 81 MG/DL (ref 70–110)
HDLC SERPL-MCNC: 64 MG/DL (ref 40–75)
HDLC SERPL: 24.7 % (ref 20–50)
LDLC SERPL CALC-MCNC: 163.2 MG/DL (ref 63–159)
NONHDLC SERPL-MCNC: 195 MG/DL
POTASSIUM SERPL-SCNC: 3.8 MMOL/L (ref 3.5–5.1)
PROT SERPL-MCNC: 7.4 G/DL (ref 6–8.4)
SODIUM SERPL-SCNC: 139 MMOL/L (ref 136–145)
T3 SERPL-MCNC: 121 NG/DL (ref 60–180)
T4 SERPL-MCNC: 7.8 UG/DL (ref 4.5–11.5)
TRIGL SERPL-MCNC: 159 MG/DL (ref 30–150)
TSH SERPL DL<=0.005 MIU/L-ACNC: 1.33 UIU/ML (ref 0.4–4)
VIT B12 SERPL-MCNC: 836 PG/ML (ref 210–950)

## 2021-09-23 PROCEDURE — 84436 ASSAY OF TOTAL THYROXINE: CPT | Performed by: INTERNAL MEDICINE

## 2021-09-23 PROCEDURE — 85651 RBC SED RATE NONAUTOMATED: CPT | Mod: PO | Performed by: INTERNAL MEDICINE

## 2021-09-23 PROCEDURE — 84443 ASSAY THYROID STIM HORMONE: CPT | Performed by: INTERNAL MEDICINE

## 2021-09-23 PROCEDURE — 86039 ANTINUCLEAR ANTIBODIES (ANA): CPT | Performed by: INTERNAL MEDICINE

## 2021-09-23 PROCEDURE — 86235 NUCLEAR ANTIGEN ANTIBODY: CPT | Mod: 59 | Performed by: INTERNAL MEDICINE

## 2021-09-23 PROCEDURE — 82300 ASSAY OF CADMIUM: CPT | Performed by: INTERNAL MEDICINE

## 2021-09-23 PROCEDURE — 84480 ASSAY TRIIODOTHYRONINE (T3): CPT | Performed by: INTERNAL MEDICINE

## 2021-09-23 PROCEDURE — 80053 COMPREHEN METABOLIC PANEL: CPT | Performed by: INTERNAL MEDICINE

## 2021-09-23 PROCEDURE — 36415 COLL VENOUS BLD VENIPUNCTURE: CPT | Mod: PO | Performed by: INTERNAL MEDICINE

## 2021-09-23 PROCEDURE — 80061 LIPID PANEL: CPT | Performed by: INTERNAL MEDICINE

## 2021-09-23 PROCEDURE — 82306 VITAMIN D 25 HYDROXY: CPT | Performed by: INTERNAL MEDICINE

## 2021-09-23 PROCEDURE — 86038 ANTINUCLEAR ANTIBODIES: CPT | Performed by: INTERNAL MEDICINE

## 2021-09-23 PROCEDURE — 86592 SYPHILIS TEST NON-TREP QUAL: CPT | Performed by: INTERNAL MEDICINE

## 2021-09-23 PROCEDURE — 86140 C-REACTIVE PROTEIN: CPT | Performed by: INTERNAL MEDICINE

## 2021-09-23 PROCEDURE — 84425 ASSAY OF VITAMIN B-1: CPT | Performed by: INTERNAL MEDICINE

## 2021-09-23 PROCEDURE — 82607 VITAMIN B-12: CPT | Performed by: INTERNAL MEDICINE

## 2021-09-24 LAB
ANA PATTERN 1: NORMAL
ANA SER QL IF: POSITIVE
ANA TITR SER IF: NORMAL {TITER}
RPR SER QL: NORMAL

## 2021-09-25 LAB
ARSENIC BLD-MCNC: <1 NG/ML
CADMIUM BLD-MCNC: 0.4 NG/ML
CITY: NORMAL
COUNTY: NORMAL
GUARDIAN FIRST NAME: NORMAL
GUARDIAN LAST NAME: NORMAL
HOME PHONE: NORMAL
LEAD BLD-MCNC: 1.4 MCG/DL
MERCURY BLD-MCNC: <1 NG/ML
RACE: NORMAL
STATE: NORMAL
STREET ADDRESS: NORMAL
VENOUS/CAPILLARY: NORMAL
ZIP: NORMAL

## 2021-09-28 LAB
ANTI SM ANTIBODY: 0.07 RATIO (ref 0–0.99)
ANTI SM/RNP ANTIBODY: 0.07 RATIO (ref 0–0.99)
ANTI-SM INTERPRETATION: NEGATIVE
ANTI-SM/RNP INTERPRETATION: NEGATIVE
ANTI-SSA ANTIBODY: 0.07 RATIO (ref 0–0.99)
ANTI-SSA INTERPRETATION: NEGATIVE
ANTI-SSB ANTIBODY: 0.05 RATIO (ref 0–0.99)
ANTI-SSB INTERPRETATION: NEGATIVE
DSDNA AB SER-ACNC: NORMAL [IU]/ML

## 2021-09-29 LAB — VIT B1 BLD-MCNC: 54 UG/L (ref 38–122)

## 2021-09-30 ENCOUNTER — OFFICE VISIT (OUTPATIENT)
Dept: RHEUMATOLOGY | Facility: CLINIC | Age: 86
End: 2021-09-30
Payer: MEDICARE

## 2021-09-30 VITALS
WEIGHT: 132.94 LBS | SYSTOLIC BLOOD PRESSURE: 134 MMHG | HEART RATE: 81 BPM | DIASTOLIC BLOOD PRESSURE: 70 MMHG | HEIGHT: 58 IN | BODY MASS INDEX: 27.91 KG/M2

## 2021-09-30 DIAGNOSIS — M15.9 PRIMARY OSTEOARTHRITIS INVOLVING MULTIPLE JOINTS: ICD-10-CM

## 2021-09-30 DIAGNOSIS — M81.0 AGE-RELATED OSTEOPOROSIS WITHOUT CURRENT PATHOLOGICAL FRACTURE: Primary | ICD-10-CM

## 2021-09-30 DIAGNOSIS — Z71.89 COUNSELING ON HEALTH PROMOTION AND DISEASE PREVENTION: ICD-10-CM

## 2021-09-30 PROCEDURE — 1126F PR PAIN SEVERITY QUANTIFIED, NO PAIN PRESENT: ICD-10-PCS | Mod: CPTII,S$GLB,, | Performed by: INTERNAL MEDICINE

## 2021-09-30 PROCEDURE — 1159F MED LIST DOCD IN RCRD: CPT | Mod: CPTII,S$GLB,, | Performed by: INTERNAL MEDICINE

## 2021-09-30 PROCEDURE — 1101F PR PT FALLS ASSESS DOC 0-1 FALLS W/OUT INJ PAST YR: ICD-10-PCS | Mod: CPTII,S$GLB,, | Performed by: INTERNAL MEDICINE

## 2021-09-30 PROCEDURE — 99204 OFFICE O/P NEW MOD 45 MIN: CPT | Mod: S$GLB,,, | Performed by: INTERNAL MEDICINE

## 2021-09-30 PROCEDURE — 3288F PR FALLS RISK ASSESSMENT DOCUMENTED: ICD-10-PCS | Mod: CPTII,S$GLB,, | Performed by: INTERNAL MEDICINE

## 2021-09-30 PROCEDURE — 1101F PT FALLS ASSESS-DOCD LE1/YR: CPT | Mod: CPTII,S$GLB,, | Performed by: INTERNAL MEDICINE

## 2021-09-30 PROCEDURE — 1159F PR MEDICATION LIST DOCUMENTED IN MEDICAL RECORD: ICD-10-PCS | Mod: CPTII,S$GLB,, | Performed by: INTERNAL MEDICINE

## 2021-09-30 PROCEDURE — 99999 PR PBB SHADOW E&M-EST. PATIENT-LVL III: ICD-10-PCS | Mod: PBBFAC,,, | Performed by: INTERNAL MEDICINE

## 2021-09-30 PROCEDURE — 3288F FALL RISK ASSESSMENT DOCD: CPT | Mod: CPTII,S$GLB,, | Performed by: INTERNAL MEDICINE

## 2021-09-30 PROCEDURE — 99999 PR PBB SHADOW E&M-EST. PATIENT-LVL III: CPT | Mod: PBBFAC,,, | Performed by: INTERNAL MEDICINE

## 2021-09-30 PROCEDURE — 1126F AMNT PAIN NOTED NONE PRSNT: CPT | Mod: CPTII,S$GLB,, | Performed by: INTERNAL MEDICINE

## 2021-09-30 PROCEDURE — 99204 PR OFFICE/OUTPT VISIT, NEW, LEVL IV, 45-59 MIN: ICD-10-PCS | Mod: S$GLB,,, | Performed by: INTERNAL MEDICINE

## 2021-11-20 NOTE — PROGRESS NOTES
SUBJECTIVE:   Isamar Roe is a 82 y.o. female   Corrected distance visual acuity was 20/30 -1 in the right eye and 20/70 in the left eye.   Chief Complaint   Patient presents with    Cataract     1 year RTC NSC OU        HPI:  HPI     Cataract    Additional comments: 1 year RTC NSC OU           Comments   Pt was seen by [facility] today, was refracted for her contacts and dilated for   Dr. Rajan's visit. Information has been carried over from Dr. Giang's   notes.    Referred by [facility]  1. Cataracts OU  2. Choroidal nevus od    SCL wear monovision       Last edited by Peña Chavez on 2/15/2018  1:51 PM. (History)        Assessment /Plan :  1. Nuclear sclerosis, bilateral Patient does reports mild visual decline from incipient cataractous changes, but not sufficient to affect activities of daily living. I recommend monitoring visual status and follow up when visual symptoms worsen.     2. Choroidal nevus of right eye  -- Condition stable, no therapeutic change required. Monitoring routinely.         RTC 1 year          
Patient informed
pain, thigh

## 2021-11-27 ENCOUNTER — HOSPITAL ENCOUNTER (EMERGENCY)
Facility: HOSPITAL | Age: 86
Discharge: HOME | End: 2021-11-27
Attending: EMERGENCY MEDICINE
Payer: MEDICARE

## 2021-11-27 VITALS
BODY MASS INDEX: 27.71 KG/M2 | HEART RATE: 75 BPM | SYSTOLIC BLOOD PRESSURE: 170 MMHG | WEIGHT: 132 LBS | RESPIRATION RATE: 18 BRPM | TEMPERATURE: 98 F | HEIGHT: 58 IN | DIASTOLIC BLOOD PRESSURE: 70 MMHG | OXYGEN SATURATION: 97 %

## 2021-11-27 DIAGNOSIS — M19.09 PRIMARY OSTEOARTHRITIS OF OTHER SITE: ICD-10-CM

## 2021-11-27 DIAGNOSIS — M79.645 PAIN OF LEFT THUMB: Primary | ICD-10-CM

## 2021-11-27 PROCEDURE — 29125 APPL SHORT ARM SPLINT STATIC: CPT | Mod: LT

## 2021-11-27 PROCEDURE — 99283 EMERGENCY DEPT VISIT LOW MDM: CPT | Mod: 25,HCNC

## 2021-11-27 PROCEDURE — 25000003 PHARM REV CODE 250: Mod: HCNC | Performed by: NURSE PRACTITIONER

## 2021-11-27 RX ORDER — ONDANSETRON 4 MG/1
4 TABLET, ORALLY DISINTEGRATING ORAL
Status: COMPLETED | OUTPATIENT
Start: 2021-11-27 | End: 2021-11-27

## 2021-11-27 RX ORDER — OXYCODONE AND ACETAMINOPHEN 5; 325 MG/1; MG/1
1 TABLET ORAL
Status: COMPLETED | OUTPATIENT
Start: 2021-11-27 | End: 2021-11-27

## 2021-11-27 RX ADMIN — OXYCODONE HYDROCHLORIDE AND ACETAMINOPHEN 1 TABLET: 5; 325 TABLET ORAL at 06:11

## 2021-11-27 RX ADMIN — ONDANSETRON 4 MG: 4 TABLET, ORALLY DISINTEGRATING ORAL at 07:11

## 2021-11-29 ENCOUNTER — OFFICE VISIT (OUTPATIENT)
Dept: NEUROSURGERY | Facility: CLINIC | Age: 86
End: 2021-11-29
Payer: MEDICARE

## 2021-11-29 ENCOUNTER — HOSPITAL ENCOUNTER (OUTPATIENT)
Dept: RADIOLOGY | Facility: HOSPITAL | Age: 86
Discharge: HOME | End: 2021-11-29
Attending: NEUROLOGICAL SURGERY
Payer: MEDICARE

## 2021-11-29 VITALS
SYSTOLIC BLOOD PRESSURE: 160 MMHG | HEIGHT: 59 IN | HEART RATE: 80 BPM | BODY MASS INDEX: 25.8 KG/M2 | DIASTOLIC BLOOD PRESSURE: 71 MMHG | TEMPERATURE: 98 F | WEIGHT: 128 LBS

## 2021-11-29 DIAGNOSIS — M43.16 SPONDYLOLISTHESIS AT L4-L5 LEVEL: Primary | ICD-10-CM

## 2021-11-29 DIAGNOSIS — M54.16 LUMBAR RADICULOPATHY: ICD-10-CM

## 2021-11-29 DIAGNOSIS — M43.27 FUSION OF SPINE, LUMBOSACRAL REGION: ICD-10-CM

## 2021-11-29 PROCEDURE — 72131 CT LUMBAR SPINE W/O DYE: CPT | Mod: TC,HCNC

## 2021-11-29 PROCEDURE — 99213 OFFICE O/P EST LOW 20 MIN: CPT | Mod: HCNC,S$GLB,, | Performed by: NEUROLOGICAL SURGERY

## 2021-11-29 PROCEDURE — 99999 PR PBB SHADOW E&M-EST. PATIENT-LVL III: ICD-10-PCS | Mod: PBBFAC,HCNC,, | Performed by: NEUROLOGICAL SURGERY

## 2021-11-29 PROCEDURE — 72131 CT LUMBAR SPINE WITHOUT CONTRAST: ICD-10-PCS | Mod: 26,HCNC,, | Performed by: STUDENT IN AN ORGANIZED HEALTH CARE EDUCATION/TRAINING PROGRAM

## 2021-11-29 PROCEDURE — 72131 CT LUMBAR SPINE W/O DYE: CPT | Mod: 26,HCNC,, | Performed by: STUDENT IN AN ORGANIZED HEALTH CARE EDUCATION/TRAINING PROGRAM

## 2021-11-29 PROCEDURE — 99999 PR PBB SHADOW E&M-EST. PATIENT-LVL III: CPT | Mod: PBBFAC,HCNC,, | Performed by: NEUROLOGICAL SURGERY

## 2021-11-29 PROCEDURE — 99213 PR OFFICE/OUTPT VISIT, EST, LEVL III, 20-29 MIN: ICD-10-PCS | Mod: HCNC,S$GLB,, | Performed by: NEUROLOGICAL SURGERY

## 2021-12-14 PROBLEM — Z91.81 AT RISK FOR FALLS: Status: ACTIVE | Noted: 2021-12-14

## 2021-12-17 ENCOUNTER — HOSPITAL ENCOUNTER (OUTPATIENT)
Dept: RADIOLOGY | Facility: HOSPITAL | Age: 86
Discharge: HOME | End: 2021-12-17
Attending: NURSE PRACTITIONER
Payer: MEDICARE

## 2021-12-17 ENCOUNTER — OFFICE VISIT (OUTPATIENT)
Dept: FAMILY MEDICINE | Facility: CLINIC | Age: 86
End: 2021-12-17
Payer: MEDICARE

## 2021-12-17 VITALS
DIASTOLIC BLOOD PRESSURE: 65 MMHG | BODY MASS INDEX: 26.41 KG/M2 | WEIGHT: 131 LBS | TEMPERATURE: 98 F | SYSTOLIC BLOOD PRESSURE: 145 MMHG | HEIGHT: 59 IN | HEART RATE: 78 BPM
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
## 2021-12-17 DIAGNOSIS — S60.419A ABRASION OF FINGER OF RIGHT HAND, INITIAL ENCOUNTER: ICD-10-CM

## 2021-12-17 PROCEDURE — 70150 X-RAY EXAM OF FACIAL BONES: CPT | Mod: 26,HCNC,, | Performed by: RADIOLOGY

## 2021-12-17 PROCEDURE — 99999 PR PBB SHADOW E&M-EST. PATIENT-LVL III: CPT | Mod: PBBFAC,HCNC,, | Performed by: NURSE PRACTITIONER

## 2021-12-17 PROCEDURE — 99213 PR OFFICE/OUTPT VISIT, EST, LEVL III, 20-29 MIN: ICD-10-PCS | Mod: HCNC,S$GLB,, | Performed by: NURSE PRACTITIONER

## 2021-12-17 PROCEDURE — 99999 PR PBB SHADOW E&M-EST. PATIENT-LVL III: ICD-10-PCS | Mod: PBBFAC,HCNC,, | Performed by: NURSE PRACTITIONER

## 2021-12-17 PROCEDURE — 99213 OFFICE O/P EST LOW 20 MIN: CPT | Mod: HCNC,S$GLB,, | Performed by: NURSE PRACTITIONER

## 2021-12-17 PROCEDURE — 70150 X-RAY EXAM OF FACIAL BONES: CPT | Mod: TC,HCNC,PO

## 2021-12-17 PROCEDURE — 70150 XR FACIAL BONES 3 OR MORE VIEW: ICD-10-PCS | Mod: 26,HCNC,, | Performed by: RADIOLOGY

## 2021-12-17 RX ORDER — MUPIROCIN 20 MG/G
OINTMENT TOPICAL 3 TIMES DAILY
Qty: 22 G | Refills: 0 | Status: SHIPPED | OUTPATIENT
Start: 2021-12-17 | End: 2023-03-15

## 2021-12-22 ENCOUNTER — PATIENT MESSAGE (OUTPATIENT)
Dept: ADMINISTRATIVE | Facility: OTHER | Age: 86
End: 2021-12-22
Payer: MEDICARE

## 2021-12-27 ENCOUNTER — OFFICE VISIT (OUTPATIENT)
Dept: FAMILY MEDICINE | Facility: CLINIC | Age: 86
End: 2021-12-27
Payer: MEDICARE

## 2021-12-27 VITALS
WEIGHT: 131.81 LBS | BODY MASS INDEX: 26.57 KG/M2 | HEIGHT: 59 IN | OXYGEN SATURATION: 97 % | HEART RATE: 78 BPM | TEMPERATURE: 98 F | SYSTOLIC BLOOD PRESSURE: 153 MMHG | DIASTOLIC BLOOD PRESSURE: 71 MMHG

## 2021-12-27 DIAGNOSIS — R30.0 DYSURIA: Primary | ICD-10-CM

## 2021-12-27 DIAGNOSIS — N39.0 URINARY TRACT INFECTION WITHOUT HEMATURIA, SITE UNSPECIFIED: ICD-10-CM

## 2021-12-27 LAB
BACTERIA #/AREA URNS HPF: ABNORMAL /HPF
BILIRUB UR QL STRIP: NEGATIVE
CLARITY UR: CLEAR
COLOR UR: YELLOW
GLUCOSE UR QL STRIP: NEGATIVE
HGB UR QL STRIP: ABNORMAL
KETONES UR QL STRIP: NEGATIVE
LEUKOCYTE ESTERASE UR QL STRIP: ABNORMAL
MICROSCOPIC COMMENT: ABNORMAL
NITRITE UR QL STRIP: POSITIVE
PH UR STRIP: 6 [PH] (ref 5–8)
PROT UR QL STRIP: NEGATIVE
RBC #/AREA URNS HPF: 10 /HPF (ref 0–4)
SP GR UR STRIP: 1.02 (ref 1–1.03)
SQUAMOUS #/AREA URNS HPF: 5 /HPF
URN SPEC COLLECT METH UR: ABNORMAL
WBC #/AREA URNS HPF: 50 /HPF (ref 0–5)

## 2021-12-27 PROCEDURE — 1160F PR REVIEW ALL MEDS BY PRESCRIBER/CLIN PHARMACIST DOCUMENTED: ICD-10-PCS | Mod: HCNC,CPTII,S$GLB, | Performed by: NURSE PRACTITIONER

## 2021-12-27 PROCEDURE — 1101F PR PT FALLS ASSESS DOC 0-1 FALLS W/OUT INJ PAST YR: ICD-10-PCS | Mod: HCNC,CPTII,S$GLB, | Performed by: NURSE PRACTITIONER

## 2021-12-27 PROCEDURE — 81000 URINALYSIS NONAUTO W/SCOPE: CPT | Mod: HCNC,PO | Performed by: NURSE PRACTITIONER

## 2021-12-27 PROCEDURE — 1125F PR PAIN SEVERITY QUANTIFIED, PAIN PRESENT: ICD-10-PCS | Mod: HCNC,CPTII,S$GLB, | Performed by: NURSE PRACTITIONER

## 2021-12-27 PROCEDURE — 1159F MED LIST DOCD IN RCRD: CPT | Mod: HCNC,CPTII,S$GLB, | Performed by: NURSE PRACTITIONER

## 2021-12-27 PROCEDURE — 99999 PR PBB SHADOW E&M-EST. PATIENT-LVL III: CPT | Mod: PBBFAC,HCNC,, | Performed by: NURSE PRACTITIONER

## 2021-12-27 PROCEDURE — 1125F AMNT PAIN NOTED PAIN PRSNT: CPT | Mod: HCNC,CPTII,S$GLB, | Performed by: NURSE PRACTITIONER

## 2021-12-27 PROCEDURE — 1160F RVW MEDS BY RX/DR IN RCRD: CPT | Mod: HCNC,CPTII,S$GLB, | Performed by: NURSE PRACTITIONER

## 2021-12-27 PROCEDURE — 87086 URINE CULTURE/COLONY COUNT: CPT | Mod: HCNC | Performed by: NURSE PRACTITIONER

## 2021-12-27 PROCEDURE — 87077 CULTURE AEROBIC IDENTIFY: CPT | Mod: HCNC | Performed by: NURSE PRACTITIONER

## 2021-12-27 PROCEDURE — 1101F PT FALLS ASSESS-DOCD LE1/YR: CPT | Mod: HCNC,CPTII,S$GLB, | Performed by: NURSE PRACTITIONER

## 2021-12-27 PROCEDURE — 87186 SC STD MICRODIL/AGAR DIL: CPT | Mod: HCNC | Performed by: NURSE PRACTITIONER

## 2021-12-27 PROCEDURE — 99213 PR OFFICE/OUTPT VISIT, EST, LEVL III, 20-29 MIN: ICD-10-PCS | Mod: HCNC,S$GLB,, | Performed by: NURSE PRACTITIONER

## 2021-12-27 PROCEDURE — 99213 OFFICE O/P EST LOW 20 MIN: CPT | Mod: HCNC,S$GLB,, | Performed by: NURSE PRACTITIONER

## 2021-12-27 PROCEDURE — 3288F PR FALLS RISK ASSESSMENT DOCUMENTED: ICD-10-PCS | Mod: HCNC,CPTII,S$GLB, | Performed by: NURSE PRACTITIONER

## 2021-12-27 PROCEDURE — 87088 URINE BACTERIA CULTURE: CPT | Mod: HCNC | Performed by: NURSE PRACTITIONER

## 2021-12-27 PROCEDURE — 3288F FALL RISK ASSESSMENT DOCD: CPT | Mod: HCNC,CPTII,S$GLB, | Performed by: NURSE PRACTITIONER

## 2021-12-27 PROCEDURE — 99999 PR PBB SHADOW E&M-EST. PATIENT-LVL III: ICD-10-PCS | Mod: PBBFAC,HCNC,, | Performed by: NURSE PRACTITIONER

## 2021-12-27 PROCEDURE — 1159F PR MEDICATION LIST DOCUMENTED IN MEDICAL RECORD: ICD-10-PCS | Mod: HCNC,CPTII,S$GLB, | Performed by: NURSE PRACTITIONER

## 2021-12-27 RX ORDER — CIPROFLOXACIN 500 MG/1
500 TABLET ORAL EVERY 12 HOURS
Qty: 20 TABLET | Refills: 0 | Status: SHIPPED | OUTPATIENT
Start: 2021-12-27 | End: 2021-12-30 | Stop reason: ALTCHOICE

## 2021-12-27 NOTE — PROGRESS NOTES
Isamar Roe  04/30/2019  [id number]    Brian Shin MD  Patient Care Team:  Brian Shin MD as PCP - General (Family Medicine)  CASEY Giang OD as Consulting Physician (Optometry)  Reinaldo Noriega III, MD (Dermatology)  Leticia Maya MD as Consulting Physician (Endocrinology)  Oscar Hanson LPN as Care Coordinator (Internal Medicine)        Chief Complaint:  Chief Complaint   Patient presents with    Urinary Tract Infection     noticed sx on yesterday morning       History of Present Illness:  This work-in patient says that she has had some burning and stinging along with urinary frequency the for the past 3 days.  Otherwise she has not had a history of bladder infection in over a year.  She has noticed that her urine is somewhat cloudy but has not had fever.  When I mentioned following up with her primary care in a few weeks she said that because of location she is planning to change to a primary care here at [place] so I told her the nurse can give her a few ideas regarding that.  The urinary changes had an acute onset and she does not think that they have any relation to her low back problem.    The patient did complain of continuing low back pain with radiation down the right leg that she has had for 3 in 4 months and says that she is getting side effects with gabapentin and has not refilled it.  She did not do well on her 1st injection from pain management but I encouraged her to go ahead and try the 2nd 1 they have offered and to be sure and communicate clearly with the pain management staff about what helps her and what does not, and asking questions about what she does not understand.        History:  Past Medical History:   Diagnosis Date    Arthritis     hands    Benign hematuria     [place]    Calcific tendinitis of left shoulder 1/27/2016    Cataract     Diverticulosis     colonoscopy 5/22/2012    Duodenal diverticulum     EGD 5/22/2012    GERD (gastroesophageal reflux disease)   Pt called to report she has finally stopped bleeding (bled since iud insertion last year), but recently had 4 positive pregnancy tests.  Should I go ahead and schedule New Ob appt, or do you want to start with labs?    Please advise.     Pt # [phone] (please be sure you are speaking with pt)      Hemorrhoids     colonoscopy 5/22/2012    History of skin cancer     per patient s/p excision, no chemo or radiation, sees outside derm, Dr. Tinsley.    Hyperlipidemia     Hypertension     Impingement syndrome of left shoulder 1/27/2016    Osteoporosis     declines tx;11/17/16 phone note    Pneumonia     as a child    Sciatica     [place]; dr castillo    Skin cancer     Dr. Noriega    Vitamin D deficiency disease      Past Surgical History:   Procedure Laterality Date    APPENDECTOMY      CARPAL TUNNEL RELEASE Bilateral     CHOLECYSTECTOMY      FACETECTOMY Left 9/16/2014    [facility] 2ND F[facility]    HYSTERECTOMY      joint removal in right thumb      LAMINECTOMY-ANGELIA-SEMI Left 9/16/2014    [facility] OR 2ND FLR    MICRODISKECTOMY-MINIMALLY INVASIVE Left 9/16/2014    [facility] OR 2ND FLR    SKIN CANCER EXCISION      SPINE SURGERY  9/16/14    L4/5 laminectomy;dr lozada    TOE SURGERY      LT FIFTH     Family History   Problem Relation Age of Onset    Heart disease Mother     Hypertension Mother     Cataracts Mother     Heart disease Father     Hypertension Father     Cataracts Father     Heart disease Brother 45    Heart disease Sister         A-fib    Heart disease Sister         heart bypass x 5    Kidney disease Brother 70    Heart disease Brother     Cancer Maternal Aunt         breast    Diabetes Maternal Grandmother     Diabetes Maternal Aunt     Hypertension Other         multiple family members    Cancer Other         multiple with skin cancer    Stroke Neg Hx     Melanoma Neg Hx      Social History     Socioeconomic History    Marital status:      Spouse name: AMBREEN    Number of children: 4    Years of education: Not on file    Highest education level: Not on file   Occupational History    Not on file   Social Needs    Financial resource strain: Not on file    Food insecurity:      Worry: Not on file     Inability: Not on file    Transportation needs:     Medical: Not on file     Non-medical: Not on file   Tobacco Use    Smoking status: Never Smoker    Smokeless tobacco: Never Used   Substance and Sexual Activity    Alcohol use: No    Drug use: No    Sexual activity: Yes     Partners: Male     Birth control/protection: See Surgical Hx   Lifestyle    Physical activity:     Days per week: Not on file     Minutes per session: Not on file    Stress: Not on file   Relationships    Social connections:     Talks on phone: Not on file     Gets together: Not on file     Attends Latter-day service: Not on file     Active member of club or organization: Not on file     Attends meetings of clubs or organizations: Not on file     Relationship status: Not on file   Other Topics Concern    Are you pregnant or think you may be? No    Breast-feeding No   Social History Narrative    Wears a seatbelt.     Patient Active Problem List   Diagnosis    HTN (hypertension)    Hyperlipidemia    Hiatal hernia with gastroesophageal reflux    Osteoporosis    Nuclear sclerosis of both eyes    Choroidal nevus of right eye    Arteriosclerosis of aorta    Heart valve regurgitation    History of skin cancer    Maxillary sinusitis    Cough    Statin intolerance     Review of patient's allergies indicates:   Allergen Reactions    Ace inhibitors      Other reaction(s): cough    Amlodipine      Leg swelling    Chlorthalidone      Other reaction(s): ? dizzy  Other reaction(s): ? dizzy    Codeine Nausea And Vomiting    Demerol (pf)  [meperidine (pf)]      Other reaction(s): Itching    Fosamax  [alendronate]      Other reaction(s): aches    Fosamax plus d  [alendronate-vitamin d3]      Other reaction(s): aches    Hydrochlorothiazide (bulk)      Other reaction(s): bad taste    Hydrocodone-acetaminophen      Other reaction(s): Stomach upset  Other reaction(s): Nausea    Ibandronate      Other reaction(s):  aches  Other reaction(s): aches    Ibandronate sodium      Other reaction(s): Unknown    Losartan Diarrhea    Meperidine Itching    Opioids - morphine analogues     Opium     Statins-hmg-coa reductase inhibitors      Few statins intol    Toprol xl  [metoprolol succinate]      Other reaction(s): diarrhea  Other reaction(s): diarrhea    Tramadol        The following were reviewed at this visit: active problem list, medication list, allergies, family history, social history, and health maintenance.    Medications:  Current Outpatient Medications on File Prior to Visit   Medication Sig Dispense Refill    calcium-vitamin D 250-100 mg-unit per tablet Take 1 tablet by mouth.      gatifloxacin 0.5 % Drop drops Place 1 drop into the left eye 2 (two) times daily. Eyedrops to start one day before surgery 1 Bottle 2    hydrocortisone 2.5 % cream Apply to seborrhea at night if needed.      ketorolac 0.5% (ACULAR) 0.5 % Drop Place 1 drop into the left eye 4 (four) times daily. Eyedrops to start one day before surgery 1 Bottle 2    niacin, inositol niacinate, 400 mg niacin (500 mg) Cap Take 1 capsule by mouth once daily.       prednisoLONE acetate (PRED FORTE) 1 % DrpS Place 1 drop into the left eye 4 (four) times daily. Start one day before surgery 5 mL 2    verapamil (CALAN) 120 MG tablet Take 1 tablet (120 mg total) by mouth 2 (two) times daily. 60 tablet 5    vitamin E 400 UNIT capsule Take 400 Units by mouth once daily.      fish oil-fat acid comb.8-hb137 (OMEGA 3-6-9) 1,200 mg Cap Take 1 capsule by mouth once daily.       gabapentin (NEURONTIN) 300 MG capsule Take 1 capsule (300 mg total) by mouth 2 (two) times daily. 60 capsule 1    loratadine (CLARITIN) 10 mg tablet Take 1 tablet (10 mg total) by mouth once daily.  0    ondansetron (ZOFRAN) 4 MG tablet Take 4 mg by mouth.       No current facility-administered medications on file prior to visit.        Medications have been reviewed and reconciled with  patient at this visit.      Exam:  Wt Readings from Last 3 Encounters:   04/30/19 62.2 kg (137 lb 2 oz)   04/15/19 61 kg (134 lb 7.7 oz)   04/03/19 60.8 kg (134 lb)     Temp Readings from Last 3 Encounters:   04/30/19 97.4 °F (36.3 °C) (Tympanic)   02/11/19 96.5 °F (35.8 °C) (Tympanic)   02/04/19 97.4 °F (36.3 °C) (Tympanic)     BP Readings from Last 3 Encounters:   04/30/19 138/76   04/15/19 133/65   04/03/19 (!) 170/90     Pulse Readings from Last 3 Encounters:   04/30/19 91   04/15/19 80   04/03/19 82     Body mass index is 28.66 kg/m².      Review of Systems   Constitutional: Negative for chills, fever and weight loss.   Respiratory: Negative for shortness of breath.    Cardiovascular: Negative for chest pain and palpitations.     Physical Exam very pleasant adult female alert and oriented well-nourished well-developed and in no acute distress    Abdomen is soft and nontender and there is no CVA tenderness  Extremities no cyanosis clubbing or edema    The patient is not here for her back problems today and I have not specifically examined her but she does state that while she is sitting in the chair she feels low back pain radiating with tingling down to her toes of the right foot.        Isamar was seen today for urinary tract infection.    Diagnoses and all orders for this visit:    Dysuria  -     Urinalysis; Future  -     Urine culture; Future  -     Urine culture  -     Urinalysis    Lumbar radiculopathy, right    Acute cystitis with hematuria    Other orders  -     Urinalysis Microscopic  -     ciprofloxacin HCl (CIPRO) 500 MG tablet; Take 1 tablet (500 mg total) by mouth 2 (two) times daily.                  Follow up: Follow up in about 2 weeks (around 5/14/2019) for With PCP for follow-up on UTI.    After visit summary was printed and given to patient upon discharge today.  Patient goals and care plan are included in After Visit Summary.

## 2021-12-29 LAB — BACTERIA UR CULT: ABNORMAL

## 2021-12-30 ENCOUNTER — PATIENT MESSAGE (OUTPATIENT)
Dept: FAMILY MEDICINE | Facility: CLINIC | Age: 86
End: 2021-12-30
Payer: MEDICARE

## 2021-12-30 DIAGNOSIS — N39.0 URINARY TRACT INFECTION WITHOUT HEMATURIA, SITE UNSPECIFIED: Primary | ICD-10-CM

## 2021-12-30 RX ORDER — SULFAMETHOXAZOLE AND TRIMETHOPRIM 800; 160 MG/1; MG/1
1 TABLET ORAL 2 TIMES DAILY
Qty: 20 TABLET | Refills: 0 | Status: SHIPPED | OUTPATIENT
Start: 2021-12-30 | End: 2022-02-23

## 2022-02-01 ENCOUNTER — TELEPHONE (OUTPATIENT)
Dept: RHEUMATOLOGY | Facility: CLINIC | Age: 87
End: 2022-02-01
Payer: MEDICARE

## 2022-02-01 NOTE — TELEPHONE ENCOUNTER
Left message for patient to call regarding 4-21 appointment with Dr. Corbett . He will be out and appointment needs to be rescheduled.

## 2022-02-02 ENCOUNTER — TELEPHONE (OUTPATIENT)
Dept: RHEUMATOLOGY | Facility: CLINIC | Age: 87
End: 2022-02-02
Payer: MEDICARE

## 2022-02-02 NOTE — TELEPHONE ENCOUNTER
----- Message from Katherin Mills LPN sent at 2/1/2022  4:32 PM CST -----  Contact: Isamar    ----- Message -----  From: Latesha Muniz  Sent: 2/1/2022   3:47 PM CST  To: [facility] Castrejon Staff    Isamar would like a call back in regards to a missed call, please call her at [phone]

## 2022-02-02 NOTE — TELEPHONE ENCOUNTER
Spoke with patient regarding 4-21 book out for Dr. Corbett . Patient rescheduled with Casi Prieto PA-C 4-21.

## 2022-02-23 ENCOUNTER — OFFICE VISIT (OUTPATIENT)
Dept: FAMILY MEDICINE | Facility: CLINIC | Age: 87
End: 2022-02-23
Payer: MEDICARE

## 2022-02-23 ENCOUNTER — LAB VISIT (OUTPATIENT)
Dept: LAB | Facility: HOSPITAL | Age: 87
End: 2022-02-23
Attending: INTERNAL MEDICINE
Payer: MEDICARE

## 2022-02-23 VITALS
WEIGHT: 131 LBS | HEART RATE: 83 BPM | DIASTOLIC BLOOD PRESSURE: 85 MMHG | TEMPERATURE: 98 F | SYSTOLIC BLOOD PRESSURE: 134 MMHG | BODY MASS INDEX: 27.5 KG/M2 | HEIGHT: 58 IN

## 2022-02-23 DIAGNOSIS — M81.0 AGE-RELATED OSTEOPOROSIS WITHOUT CURRENT PATHOLOGICAL FRACTURE: Chronic | ICD-10-CM

## 2022-02-23 DIAGNOSIS — Z23 INFLUENZA VACCINE NEEDED: ICD-10-CM

## 2022-02-23 DIAGNOSIS — M43.16 SPONDYLOLISTHESIS AT L4-L5 LEVEL: ICD-10-CM

## 2022-02-23 DIAGNOSIS — I10 ESSENTIAL HYPERTENSION: Primary | ICD-10-CM

## 2022-02-23 DIAGNOSIS — I10 ESSENTIAL HYPERTENSION: ICD-10-CM

## 2022-02-23 DIAGNOSIS — E78.2 MIXED HYPERLIPIDEMIA: ICD-10-CM

## 2022-02-23 LAB
ANION GAP SERPL CALC-SCNC: 10 MMOL/L (ref 8–16)
BUN SERPL-MCNC: 21 MG/DL (ref 8–23)
CALCIUM SERPL-MCNC: 10.1 MG/DL (ref 8.7–10.5)
CHLORIDE SERPL-SCNC: 101 MMOL/L (ref 95–110)
CO2 SERPL-SCNC: 31 MMOL/L (ref 23–29)
CREAT SERPL-MCNC: 0.8 MG/DL (ref 0.5–1.4)
EST. GFR  (AFRICAN AMERICAN): >60 ML/MIN/1.73 M^2
EST. GFR  (NON AFRICAN AMERICAN): >60 ML/MIN/1.73 M^2
GLUCOSE SERPL-MCNC: 111 MG/DL (ref 70–110)
POTASSIUM SERPL-SCNC: 3.8 MMOL/L (ref 3.5–5.1)
SODIUM SERPL-SCNC: 142 MMOL/L (ref 136–145)

## 2022-02-23 PROCEDURE — G0008 FLU VACCINE - QUADRIVALENT - ADJUVANTED: ICD-10-PCS | Mod: HCNC,S$GLB,, | Performed by: INTERNAL MEDICINE

## 2022-02-23 PROCEDURE — 1159F PR MEDICATION LIST DOCUMENTED IN MEDICAL RECORD: ICD-10-PCS | Mod: HCNC,CPTII,S$GLB, | Performed by: INTERNAL MEDICINE

## 2022-02-23 PROCEDURE — 3288F PR FALLS RISK ASSESSMENT DOCUMENTED: ICD-10-PCS | Mod: HCNC,CPTII,S$GLB, | Performed by: INTERNAL MEDICINE

## 2022-02-23 PROCEDURE — 1126F AMNT PAIN NOTED NONE PRSNT: CPT | Mod: HCNC,CPTII,S$GLB, | Performed by: INTERNAL MEDICINE

## 2022-02-23 PROCEDURE — 90694 VACC AIIV4 NO PRSRV 0.5ML IM: CPT | Mod: HCNC,S$GLB,, | Performed by: INTERNAL MEDICINE

## 2022-02-23 PROCEDURE — 90694 FLU VACCINE - QUADRIVALENT - ADJUVANTED: ICD-10-PCS | Mod: HCNC,S$GLB,, | Performed by: INTERNAL MEDICINE

## 2022-02-23 PROCEDURE — 1159F MED LIST DOCD IN RCRD: CPT | Mod: HCNC,CPTII,S$GLB, | Performed by: INTERNAL MEDICINE

## 2022-02-23 PROCEDURE — 99214 OFFICE O/P EST MOD 30 MIN: CPT | Mod: HCNC,S$GLB,, | Performed by: INTERNAL MEDICINE

## 2022-02-23 PROCEDURE — 80048 BASIC METABOLIC PNL TOTAL CA: CPT | Mod: HCNC | Performed by: INTERNAL MEDICINE

## 2022-02-23 PROCEDURE — 99999 PR PBB SHADOW E&M-EST. PATIENT-LVL III: CPT | Mod: PBBFAC,HCNC,, | Performed by: INTERNAL MEDICINE

## 2022-02-23 PROCEDURE — 99999 PR PBB SHADOW E&M-EST. PATIENT-LVL III: ICD-10-PCS | Mod: PBBFAC,HCNC,, | Performed by: INTERNAL MEDICINE

## 2022-02-23 PROCEDURE — 36415 COLL VENOUS BLD VENIPUNCTURE: CPT | Mod: HCNC,PO | Performed by: INTERNAL MEDICINE

## 2022-02-23 PROCEDURE — 3288F FALL RISK ASSESSMENT DOCD: CPT | Mod: HCNC,CPTII,S$GLB, | Performed by: INTERNAL MEDICINE

## 2022-02-23 PROCEDURE — 1126F PR PAIN SEVERITY QUANTIFIED, NO PAIN PRESENT: ICD-10-PCS | Mod: HCNC,CPTII,S$GLB, | Performed by: INTERNAL MEDICINE

## 2022-02-23 PROCEDURE — G0008 ADMIN INFLUENZA VIRUS VAC: HCPCS | Mod: HCNC,S$GLB,, | Performed by: INTERNAL MEDICINE

## 2022-02-23 PROCEDURE — 1101F PR PT FALLS ASSESS DOC 0-1 FALLS W/OUT INJ PAST YR: ICD-10-PCS | Mod: HCNC,CPTII,S$GLB, | Performed by: INTERNAL MEDICINE

## 2022-02-23 PROCEDURE — 99214 PR OFFICE/OUTPT VISIT, EST, LEVL IV, 30-39 MIN: ICD-10-PCS | Mod: HCNC,S$GLB,, | Performed by: INTERNAL MEDICINE

## 2022-02-23 PROCEDURE — 1101F PT FALLS ASSESS-DOCD LE1/YR: CPT | Mod: HCNC,CPTII,S$GLB, | Performed by: INTERNAL MEDICINE

## 2022-02-27 NOTE — PROGRESS NOTES
Results have been released via [facility]. Please verify that these have been viewed by patient. If not, please call patient with results.    I have sent a message to them with the following interpretation (see below).    I have reviewed your recent lab results.     Electrolytes and kidney function are stable.    Please do not hesitate to call or message with any additional questions or concerns.    Alina Roe MD

## 2022-02-27 NOTE — PROGRESS NOTES
Assessment/Plan:    Problem List Items Addressed This Visit        Cardiac/Vascular    Essential hypertension - Primary    Overview     -at goal today  -currently on HCTZ 12.5 mg QD, verapamil 120 mg BID  -Did not tolerate ACE, Norvasc, Chlorothiadone in the past, per last PCP notes  -continue lifestyle modification with low sodium diet and exercise   -discussed hypertension disease course and importance of treating high blood pressure  -patient understood and advised of risk of untreated blood pressure.  ER precautions were given   for symptoms of hypertensive urgency and emergency.           Relevant Orders    Basic Metabolic Panel (Completed)    Mixed hyperlipidemia    Overview     Lab Results   Component Value Date    LDLCALC 163.2 (H) 09/23/2021   -statin intolerant  -working on dietary changes and take daily fish oil              Orthopedic    Osteoporosis (Chronic)    Overview     -last DEXA showing worsening osteoporosis  -intolerant of bisphonates  -referred to rheumatology and has start prolia  -continue weight bearing exercises           Spondylolisthesis at L4-L5 level    Overview     -status post L4-5 TLIF in June 2021  -followed by neurosurgery  -plan for follow up with CT soon             Other Visit Diagnoses     Influenza vaccine needed        Relevant Orders    Influenza - Quadrivalent (Adjuvanted) (Completed)          Follow up in about 6 months (around 8/23/2022).    Alina Roe MD  _____________________________________________________________________________________________________________________________________________________    CC: follow up of chronic medical conditions     HPI:    Patient is in clinic today as an established patient here for follow up of chronic medical conditions.    HTN: The patient is currently being treated for essential hypertension. This condition is chronic and stable. The patient is tolerating their medication well with good compliance.  Denies any adverse  effects of medications.  Counseling was offered regarding low sodium diet.  The patient has a reduced salt intake. Routine exercise recommended. The patient denies headache, vision changes, chest pain, palpitations, shortness of breath, or lower extremity edema.    No new complaints today. Remaining chronic conditions have been reviewed and remain stable. Further detail as stated above.       Past Medical History:  Past Medical History:   Diagnosis Date    Arthritis     hands    Benign hematuria     shingleton    Calcific tendinitis of left shoulder 1/27/2016    Cataract     Diverticulosis     colonoscopy 5/22/2012    Duodenal diverticulum     EGD 5/22/2012    GERD (gastroesophageal reflux disease)     Hemorrhoids     colonoscopy 5/22/2012    History of skin cancer     per patient s/p excision, no chemo or radiation, sees outside derm, Dr. Tinsley.    Hyperlipidemia     Hypertension     Impingement syndrome of left shoulder 1/27/2016    Osteoporosis     declines tx;11/17/16 phone note    Pneumonia     as a child    Sciatica     santhosh; dr castillo    Skin cancer     Dr. Noriega    Vitamin D deficiency disease      Past Surgical History:   Procedure Laterality Date    APPENDECTOMY      CARPAL TUNNEL RELEASE Bilateral     CATARACT EXTRACTION W/  INTRAOCULAR LENS IMPLANT Left 05/01/2019    CATARACT EXTRACTION W/  INTRAOCULAR LENS IMPLANT Right 05/15/2019    CHOLECYSTECTOMY      HYSTERECTOMY      joint removal in right thumb      MINIMALLY INVASIVE TRANSFORAMINAL LUMBAR INTERBODY FUSION (TLIF) N/A 6/29/2021    Procedure: FUSION, SPINE, LUMBAR, TLIF, MINIMALLY INVASIVE;  Surgeon: Iraida Lozada MD;  Location: [facility] OR [address] [place] [address];  Service: Neurosurgery;  Laterality: N/A;  L4-5    SKIN CANCER EXCISION      SPINE SURGERY  9/16/14    L4/5 laminectomy;dr lozada    TOE SURGERY      [facility]     Review of patient's allergies indicates:   Allergen Reactions    Meloxicam Swelling    Ace inhibitors      Other  reaction(s): cough    Amlodipine      Leg swelling    Chlorthalidone      Other reaction(s): ? dizzy  Other reaction(s): ? dizzy    Codeine Nausea And Vomiting    Demerol (pf)  [meperidine (pf)]      Other reaction(s): Itching    Fosamax  [alendronate]      Other reaction(s): aches    Fosamax plus d  [alendronate-vitamin d3]      Other reaction(s): aches    Hydrochlorothiazide (bulk)      Other reaction(s): bad taste    Hydrocodone-acetaminophen      Other reaction(s): Stomach upset  Other reaction(s): Nausea    Ibandronate      Other reaction(s): aches  Other reaction(s): aches    Ibandronate sodium      Other reaction(s): Unknown    Losartan Diarrhea    Meperidine Itching    Opioids - morphine analogues     Opium     Statins-hmg-coa reductase inhibitors      Few statins intol    Toprol xl  [metoprolol succinate]      Other reaction(s): diarrhea  Other reaction(s): diarrhea    Tramadol     Trazodone Other (See Comments)     Dry mouth     Social History     Tobacco Use    Smoking status: Never Smoker    Smokeless tobacco: Never Used   Substance Use Topics    Alcohol use: No    Drug use: No     Family History   Problem Relation Age of Onset    Heart disease Mother     Hypertension Mother     Cataracts Mother     Heart disease Father     Hypertension Father     Cataracts Father     Heart disease Brother 45    Heart disease Sister         A-fib    Heart disease Sister         heart bypass x 5    Kidney disease Brother 70    Heart disease Brother     Cancer Maternal Aunt         breast    Diabetes Maternal Grandmother     Diabetes Maternal Aunt     Hypertension Other         multiple family members    Cancer Other         multiple with skin cancer    Stroke Neg Hx     Melanoma Neg Hx     Hyperlipidemia Neg Hx      Current Outpatient Medications on File Prior to Visit   Medication Sig Dispense Refill    hydroCHLOROthiazide (MICROZIDE) 12.5 mg capsule TAKE 1 CAPSULE BY MOUTH ONCE  "DAILY 90 capsule 1    mupirocin (BACTROBAN) 2 % ointment Apply topically 3 (three) times daily. 22 g 0    verapamiL (CALAN) 120 MG tablet TAKE 1 TABLET BY MOUTH TWICE DAILY 180 tablet 3     No current facility-administered medications on file prior to visit.       Review of Systems   Constitutional: Negative for chills, diaphoresis, fatigue and fever.   HENT: Negative for congestion, ear pain, postnasal drip, sinus pain and sore throat.    Eyes: Negative for pain and redness.   Respiratory: Negative for cough, chest tightness and shortness of breath.    Cardiovascular: Negative for chest pain and leg swelling.   Gastrointestinal: Negative for abdominal pain, constipation, diarrhea, nausea and vomiting.   Genitourinary: Negative for dysuria and hematuria.   Musculoskeletal: Negative for arthralgias and joint swelling.   Skin: Negative for rash.   Neurological: Negative for dizziness, syncope and headaches.       Vitals:    02/23/22 1050   BP: 134/85   Pulse: 83   Temp: 97.8 °F (36.6 °C)   Weight: 59.4 kg (131 lb)   Height: 4' 10" (1.473 m)       Wt Readings from Last 3 Encounters:   02/23/22 59.4 kg (131 lb)   12/27/21 59.8 kg (131 lb 12.8 oz)   12/17/21 59.4 kg (131 lb)       Physical Exam  Constitutional:       General: She is not in acute distress.     Appearance: Normal appearance. She is well-developed.   HENT:      Head: Normocephalic and atraumatic.   Eyes:      Conjunctiva/sclera: Conjunctivae normal.   Cardiovascular:      Rate and Rhythm: Normal rate and regular rhythm.      Pulses: Normal pulses.      Heart sounds: Normal heart sounds. No murmur heard.  Pulmonary:      Effort: Pulmonary effort is normal. No respiratory distress.      Breath sounds: Normal breath sounds.   Abdominal:      General: Bowel sounds are normal. There is no distension.      Palpations: Abdomen is soft.      Tenderness: There is no abdominal tenderness.   Musculoskeletal:         General: Normal range of motion.      Cervical " back: Normal range of motion and neck supple.   Skin:     General: Skin is warm and dry.      Findings: No rash.   Neurological:      General: No focal deficit present.      Mental Status: She is alert and oriented to person, place, and time.         Health Maintenance   Topic Date Due    TETANUS VACCINE  06/25/2018    Lipid Panel  09/23/2022    DEXA Scan  03/08/2023

## 2022-03-31 ENCOUNTER — HOSPITAL ENCOUNTER (OUTPATIENT)
Dept: RADIOLOGY | Facility: HOSPITAL | Age: 87
Discharge: HOME | End: 2022-03-31
Attending: NEUROLOGICAL SURGERY
Payer: MEDICARE

## 2022-03-31 ENCOUNTER — OFFICE VISIT (OUTPATIENT)
Dept: NEUROSURGERY | Facility: CLINIC | Age: 87
End: 2022-03-31
Payer: MEDICARE

## 2022-03-31 VITALS
HEIGHT: 69 IN | BODY MASS INDEX: 19.4 KG/M2 | SYSTOLIC BLOOD PRESSURE: 136 MMHG | HEART RATE: 72 BPM | TEMPERATURE: 99 F | DIASTOLIC BLOOD PRESSURE: 74 MMHG | WEIGHT: 131 LBS

## 2022-03-31 DIAGNOSIS — M54.16 LUMBAR RADICULOPATHY: ICD-10-CM

## 2022-03-31 DIAGNOSIS — M43.16 SPONDYLOLISTHESIS AT L4-L5 LEVEL: Primary | ICD-10-CM

## 2022-03-31 DIAGNOSIS — M43.16 SPONDYLOLISTHESIS AT L4-L5 LEVEL: ICD-10-CM

## 2022-03-31 PROCEDURE — 72131 CT LUMBAR SPINE W/O DYE: CPT | Mod: TC

## 2022-03-31 PROCEDURE — 99213 PR OFFICE/OUTPT VISIT, EST, LEVL III, 20-29 MIN: ICD-10-PCS | Mod: S$GLB,,, | Performed by: NEUROLOGICAL SURGERY

## 2022-03-31 PROCEDURE — 1125F AMNT PAIN NOTED PAIN PRSNT: CPT | Mod: CPTII,S$GLB,, | Performed by: NEUROLOGICAL SURGERY

## 2022-03-31 PROCEDURE — 99999 PR PBB SHADOW E&M-EST. PATIENT-LVL III: ICD-10-PCS | Mod: PBBFAC,,, | Performed by: NEUROLOGICAL SURGERY

## 2022-03-31 PROCEDURE — 72131 CT LUMBAR SPINE W/O DYE: CPT | Mod: 26,,, | Performed by: RADIOLOGY

## 2022-03-31 PROCEDURE — 72131 CT LUMBAR SPINE WITHOUT CONTRAST: ICD-10-PCS | Mod: 26,,, | Performed by: RADIOLOGY

## 2022-03-31 PROCEDURE — 99999 PR PBB SHADOW E&M-EST. PATIENT-LVL III: CPT | Mod: PBBFAC,,, | Performed by: NEUROLOGICAL SURGERY

## 2022-03-31 PROCEDURE — 1125F PR PAIN SEVERITY QUANTIFIED, PAIN PRESENT: ICD-10-PCS | Mod: CPTII,S$GLB,, | Performed by: NEUROLOGICAL SURGERY

## 2022-03-31 PROCEDURE — 99213 OFFICE O/P EST LOW 20 MIN: CPT | Mod: S$GLB,,, | Performed by: NEUROLOGICAL SURGERY

## 2022-03-31 NOTE — PROGRESS NOTES
Neurosurgery  Established Patient    SUBJECTIVE:     History of Present Illness:  Ms. Roe is an 87-year-old female who is seeing me today in follow-up.  Her last neurosurgery clinic appointment was on November 29, 2021. She was taken to the operating room in June 2021. Preoperatively, she had history of a left L4-5 minimally invasive laminotomy for resection of synovial cyst.  This was done in September 2014. Prior to this procedure she complained of low back pain and mainly left leg pain.  This resolved after surgery.  More recently, she complained of worsening low back pain with right lower extremity radiating pain.  Imaging studies showed an L4-5 spondylolisthesis with movement on flexion and extension x-rays.  After failing conservative therapy, she was taken to the operating room for an L4-5 TLIF.  At the time of her last clinic appointment she was doing well.  She complained of some back soreness when she over exerted herself but denied any leg pain.  She is here today to see me in follow-up.  She states that her back pain has been worsening.  Especially when she is up and active for extended period of time.  She denies any lower extremity radiculopathy.  If she sits down and relaxes in a recliner, the back pain will eventually subside.  She does have back pain when she is sleeping at night that prevents her from getting back to sleep.  She denies any weakness.  She denies any bowel and bladder incontinence.    Review of patient's allergies indicates:   Allergen Reactions    Meloxicam Swelling    Ace inhibitors      Other reaction(s): cough    Amlodipine      Leg swelling    Chlorthalidone      Other reaction(s): ? dizzy  Other reaction(s): ? dizzy    Codeine Nausea And Vomiting    Demerol (pf)  [meperidine (pf)]      Other reaction(s): Itching    Fosamax  [alendronate]      Other reaction(s): aches    Fosamax plus d  [alendronate-vitamin d3]      Other reaction(s): aches    Hydrochlorothiazide  (bulk)      Other reaction(s): bad taste    Hydrocodone-acetaminophen      Other reaction(s): Stomach upset  Other reaction(s): Nausea    Ibandronate      Other reaction(s): aches  Other reaction(s): aches    Ibandronate sodium      Other reaction(s): Unknown    Losartan Diarrhea    Meperidine Itching    Opioids - morphine analogues     Opium     Statins-hmg-coa reductase inhibitors      Few statins intol    Toprol xl  [metoprolol succinate]      Other reaction(s): diarrhea  Other reaction(s): diarrhea    Tramadol     Trazodone Other (See Comments)     Dry mouth       Current Outpatient Medications   Medication Sig Dispense Refill    hydroCHLOROthiazide (MICROZIDE) 12.5 mg capsule TAKE 1 CAPSULE BY MOUTH ONCE DAILY 90 capsule 1    mupirocin (BACTROBAN) 2 % ointment Apply topically 3 (three) times daily. 22 g 0    verapamiL (CALAN) 120 MG tablet TAKE 1 TABLET BY MOUTH TWICE DAILY 180 tablet 3     No current facility-administered medications for this visit.       Past Medical History:   Diagnosis Date    Arthritis     hands    Benign hematuria     shingleton    Calcific tendinitis of left shoulder 1/27/2016    Cataract     Diverticulosis     colonoscopy 5/22/2012    Duodenal diverticulum     EGD 5/22/2012    GERD (gastroesophageal reflux disease)     Hemorrhoids     colonoscopy 5/22/2012    History of skin cancer     per patient s/p excision, no chemo or radiation, sees outside derm, Dr. Tinsley.    Hyperlipidemia     Hypertension     Impingement syndrome of left shoulder 1/27/2016    Osteoporosis     declines tx;11/17/16 phone note    Pneumonia     as a child    Sciatica     santhosh; dr castillo    Skin cancer     Dr. Noriega    Vitamin D deficiency disease      Past Surgical History:   Procedure Laterality Date    APPENDECTOMY      CARPAL TUNNEL RELEASE Bilateral     CATARACT EXTRACTION W/  INTRAOCULAR LENS IMPLANT Left 05/01/2019    CATARACT EXTRACTION W/  INTRAOCULAR LENS IMPLANT Right  "05/15/2019    CHOLECYSTECTOMY      HYSTERECTOMY      joint removal in right thumb      MINIMALLY INVASIVE TRANSFORAMINAL LUMBAR INTERBODY FUSION (TLIF) N/A 6/29/2021    Procedure: FUSION, SPINE, LUMBAR, TLIF, MINIMALLY INVASIVE;  Surgeon: Iraida Lozada MD;  Location: [facility] OR [address] [place] [address];  Service: Neurosurgery;  Laterality: N/A;  L4-5    SKIN CANCER EXCISION      SPINE SURGERY  9/16/14    L4/5 laminectomy;dr lozada    TOE SURGERY      LT FIF[facility]     Family History     Problem Relation (Age of Onset)    Cancer Maternal Aunt, Other    Cataracts Mother, Father    Diabetes Maternal Grandmother, Maternal Aunt    Heart disease Mother, Father, Brother (45), Sister, Sister, Brother    Hypertension Mother, Father, Other    Kidney disease Brother (70)        Social History     Socioeconomic History    Marital status:      Spouse name: AMBREEN    Number of children: 4   Tobacco Use    Smoking status: Never Smoker    Smokeless tobacco: Never Used   Substance and Sexual Activity    Alcohol use: No    Drug use: No    Sexual activity: Yes     Partners: Male     Birth control/protection: See Surgical Hx   Other Topics Concern    Are you pregnant or think you may be? No    Breast-feeding No   Social History Narrative    Wears a seatbelt.       Review of Systems    OBJECTIVE:     Vital Signs  Temp: 98.5 °F (36.9 °C)  Pulse: 72  BP: 136/74  Pain Score:   4  Height: 5' 9" (175.3 cm)  Weight: 59.4 kg (131 lb)  Body mass index is 19.35 kg/m².    Physical Exam:  Vitals reviewed.    Constitutional: She appears well-developed and well-nourished. No distress.     Eyes: Pupils are equal, round, and reactive to light. Conjunctivae and EOM are normal.     Cardiovascular: Normal rate, regular rhythm, normal pulses and no edema.     Abdominal: Soft. Bowel sounds are normal.     Skin: Skin displays no rash on trunk and no rash on extremities. Skin displays no lesions on trunk and no lesions on extremities.     Psych/Behavior: She is " alert. She is oriented to person, place, and time. She has a normal mood and affect.     Musculoskeletal: Gait is normal.        Neck: Range of motion is full. There is no tenderness. Muscle strength is 5/5. Tone is normal.        Back: Range of motion is full. There is no tenderness. Muscle strength is 5/5. Tone is normal.        Right Upper Extremities: Range of motion is full. There is no tenderness. Muscle strength is 5/5. Tone is normal.        Left Upper Extremities: Range of motion is full. There is no tenderness. Muscle strength is 5/5. Tone is normal.       Right Lower Extremities: Range of motion is full. There is no tenderness. Muscle strength is 5/5. Tone is normal.        Left Lower Extremities: Range of motion is full. There is no tenderness. Muscle strength is 5/5. Tone is normal.     Neurological:        Coordination: She has a normal Romberg Test, normal finger to nose coordination and normal tandem walking coordination.        Sensory: There is no sensory deficit in the trunk. There is no sensory deficit in the extremities.        DTRs: DTRs are DTRS NORMAL AND SYMMETRICnormal and symmetric. She displays no Babinski's sign on the right side. She displays no Babinski's sign on the left side.        Cranial nerves: Cranial nerve(s) II, III, IV, V, VI, VII, VIII, IX, X, XI and XII are intact.         Diagnostic Results:  She has a CT scan of the lumbar spine available for review which I personally reviewed.  This shows good fusion in the interbody space between L4 and L5.  There is no evidence of hardware failure or lucency.    ASSESSMENT/PLAN:     Ms. Roe is an 87-year-old female status post L4-5 TLIF in June 2021. Her latest CT of the lumbar spine shows good fusion at L4-5.  From a fusion standpoint, there is no need for any further follow-up CT scans.  She is complaining of some increasing low back pain which is worse with exertion.  This is not consistent with neurogenic claudication.  Is now  much more musculoskeletal in nature.  I would like to get the patient is in physical therapy to see if that helps with her pain.  I would like her to focus on core strengthening exercises.  I will see her back in 2-3 months to see how she is doing at that time.  She knows she can call with any further questions or concerns in the meantime.        Note dictated with voice recognition software, please excuse any grammatical errors.

## 2022-04-10 ENCOUNTER — PATIENT MESSAGE (OUTPATIENT)
Dept: ADMINISTRATIVE | Facility: OTHER | Age: 87
End: 2022-04-10
Payer: MEDICARE

## 2022-04-20 ENCOUNTER — TELEPHONE (OUTPATIENT)
Dept: RHEUMATOLOGY | Facility: CLINIC | Age: 87
End: 2022-04-20
Payer: MEDICARE

## 2022-04-20 ENCOUNTER — PATIENT OUTREACH (OUTPATIENT)
Dept: ADMINISTRATIVE | Facility: OTHER | Age: 87
End: 2022-04-20
Payer: MEDICARE

## 2022-04-20 RX ORDER — HYDROCHLOROTHIAZIDE 12.5 MG/1
CAPSULE ORAL
Qty: 90 CAPSULE | Refills: 3 | Status: SHIPPED | OUTPATIENT
Start: 2022-04-20 | End: 2023-01-20

## 2022-04-20 NOTE — TELEPHONE ENCOUNTER
No new care gaps identified.  Powered by [employer]. Reference number: [id number].   4/20/2022 7:47:50 AM CDT

## 2022-04-20 NOTE — TELEPHONE ENCOUNTER
Refill Routing Note   Medication(s) are not appropriate for processing by [facility] for the following reason(s):      - Allergy/Contraindication ( hydroCHLOROthiazide )    [facility] action(s):  Route          Medication reconciliation completed: No     Appointments  past 12m or future 3m with PCP    Date Provider   Last Visit   2/23/2022 Alina Roe MD   Next Visit   Visit date not found Alina Roe MD   ED visits in past 90 days: 0        Note composed:10:38 AM 04/20/2022

## 2022-04-21 ENCOUNTER — OFFICE VISIT (OUTPATIENT)
Dept: RHEUMATOLOGY | Facility: CLINIC | Age: 87
End: 2022-04-21
Payer: MEDICARE

## 2022-04-21 ENCOUNTER — INFUSION (OUTPATIENT)
Dept: INFUSION THERAPY | Facility: HOSPITAL | Age: 87
End: 2022-04-21
Attending: INTERNAL MEDICINE
Payer: MEDICARE

## 2022-04-21 VITALS
HEART RATE: 77 BPM | OXYGEN SATURATION: 96 % | TEMPERATURE: 98 F | RESPIRATION RATE: 18 BRPM | DIASTOLIC BLOOD PRESSURE: 70 MMHG | SYSTOLIC BLOOD PRESSURE: 141 MMHG

## 2022-04-21 VITALS
HEIGHT: 69 IN | DIASTOLIC BLOOD PRESSURE: 73 MMHG | BODY MASS INDEX: 19.52 KG/M2 | HEART RATE: 87 BPM | WEIGHT: 131.81 LBS | SYSTOLIC BLOOD PRESSURE: 138 MMHG

## 2022-04-21 DIAGNOSIS — Z71.89 COUNSELING ON HEALTH PROMOTION AND DISEASE PREVENTION: ICD-10-CM

## 2022-04-21 DIAGNOSIS — M81.0 AGE-RELATED OSTEOPOROSIS WITHOUT CURRENT PATHOLOGICAL FRACTURE: Primary | ICD-10-CM

## 2022-04-21 DIAGNOSIS — M15.9 PRIMARY OSTEOARTHRITIS INVOLVING MULTIPLE JOINTS: ICD-10-CM

## 2022-04-21 PROCEDURE — 1126F AMNT PAIN NOTED NONE PRSNT: CPT | Mod: CPTII,S$GLB,,

## 2022-04-21 PROCEDURE — 99999 PR PBB SHADOW E&M-EST. PATIENT-LVL III: CPT | Mod: PBBFAC,,,

## 2022-04-21 PROCEDURE — 1159F MED LIST DOCD IN RCRD: CPT | Mod: CPTII,S$GLB,,

## 2022-04-21 PROCEDURE — 1160F RVW MEDS BY RX/DR IN RCRD: CPT | Mod: CPTII,S$GLB,,

## 2022-04-21 PROCEDURE — 99499 UNLISTED E&M SERVICE: CPT | Mod: S$GLB,,,

## 2022-04-21 PROCEDURE — 63600175 PHARM REV CODE 636 W HCPCS: Mod: JG | Performed by: INTERNAL MEDICINE

## 2022-04-21 PROCEDURE — 3288F FALL RISK ASSESSMENT DOCD: CPT | Mod: CPTII,S$GLB,,

## 2022-04-21 PROCEDURE — 99215 PR OFFICE/OUTPT VISIT, EST, LEVL V, 40-54 MIN: ICD-10-PCS | Mod: S$GLB,,,

## 2022-04-21 PROCEDURE — 3288F PR FALLS RISK ASSESSMENT DOCUMENTED: ICD-10-PCS | Mod: CPTII,S$GLB,,

## 2022-04-21 PROCEDURE — 1160F PR REVIEW ALL MEDS BY PRESCRIBER/CLIN PHARMACIST DOCUMENTED: ICD-10-PCS | Mod: CPTII,S$GLB,,

## 2022-04-21 PROCEDURE — 96372 THER/PROPH/DIAG INJ SC/IM: CPT

## 2022-04-21 PROCEDURE — 1159F PR MEDICATION LIST DOCUMENTED IN MEDICAL RECORD: ICD-10-PCS | Mod: CPTII,S$GLB,,

## 2022-04-21 PROCEDURE — 1101F PR PT FALLS ASSESS DOC 0-1 FALLS W/OUT INJ PAST YR: ICD-10-PCS | Mod: CPTII,S$GLB,,

## 2022-04-21 PROCEDURE — 1126F PR PAIN SEVERITY QUANTIFIED, NO PAIN PRESENT: ICD-10-PCS | Mod: CPTII,S$GLB,,

## 2022-04-21 PROCEDURE — 1101F PT FALLS ASSESS-DOCD LE1/YR: CPT | Mod: CPTII,S$GLB,,

## 2022-04-21 PROCEDURE — 99215 OFFICE O/P EST HI 40 MIN: CPT | Mod: S$GLB,,,

## 2022-04-21 PROCEDURE — 99499 RISK ADDL DX/OHS AUDIT: ICD-10-PCS | Mod: S$GLB,,,

## 2022-04-21 PROCEDURE — 99999 PR PBB SHADOW E&M-EST. PATIENT-LVL III: ICD-10-PCS | Mod: PBBFAC,,,

## 2022-04-21 RX ADMIN — DENOSUMAB 60 MG: 60 INJECTION SUBCUTANEOUS at 10:04

## 2022-04-21 NOTE — NURSING
Spoke to mayank in lab regarding patient pending CMP.   Mayank stated patient blood had not been checked in at this time and was not sure if the collection had been sent to main campus. He stated he would look into where the cmp had been sent too.     approx 1250. Notified DEACON Brunner, the ordered used for patient cmp collection was routine and blood had not been checked into [facility] at this time and possibly sent to Main [facility] in [place].     Harry stated, she is comfortable giving today as Calcium levels have been high-normal and GFR, creatinine have been normal.     As of 1118 cmp has resulted and Ca level is 9.8.

## 2022-04-21 NOTE — PROGRESS NOTES
RHEUMATOLOGY OUTPATIENT CLINIC NOTE    04/21/2022    Subjective:       Patient ID: Isamar Roe is a 87 y.o. female.    Chief Complaint: Osteoporosis    HPI    Isamar Roe is a 87 y.o. pleasant female here for rheumatology follow up for osteoporosis. Patient in physical therapy post back surgery. They just gave her a shoe insert for her left foot to help with gait stability. Reports generalized stiffness but no joint pain today. Currently takes a vitamin D supplement daily but unsure of dosage. No falls or fractures since last appointment. Prior dental implants but no upcoming planned invasive dental work. She has received prolia in the past (2018) and tolerated well. States she did not continue therapy given back surgery.       Review of Systems   Constitutional: Negative for activity change, fatigue, fever and unexpected weight change.   Respiratory: Negative for cough, shortness of breath and wheezing.    Cardiovascular: Negative for chest pain, palpitations and leg swelling.   Gastrointestinal: Negative for constipation, diarrhea and nausea.   Musculoskeletal: Negative for arthralgias, back pain, gait problem, joint swelling and myalgias.   Skin: Negative for color change and rash.   Neurological: Negative for numbness.     Past Medical History:   Diagnosis Date    Arthritis     hands    Benign hematuria     shingleton    Calcific tendinitis of left shoulder 1/27/2016    Cataract     Diverticulosis     colonoscopy 5/22/2012    Duodenal diverticulum     EGD 5/22/2012    GERD (gastroesophageal reflux disease)     Hemorrhoids     colonoscopy 5/22/2012    History of skin cancer     per patient s/p excision, no chemo or radiation, sees outside derm, Dr. Tinsley.    Hyperlipidemia     Hypertension     Impingement syndrome of left shoulder 1/27/2016    Osteoporosis     declines tx;11/17/16 phone note    Pneumonia     as a child    Sciatica     santhosh; dr castillo    Skin cancer     Dr. Noriega     Vitamin D deficiency disease      Past Surgical History:   Procedure Laterality Date    APPENDECTOMY      CARPAL TUNNEL RELEASE Bilateral     CATARACT EXTRACTION W/  INTRAOCULAR LENS IMPLANT Left 05/01/2019    CATARACT EXTRACTION W/  INTRAOCULAR LENS IMPLANT Right 05/15/2019    CHOLECYSTECTOMY      HYSTERECTOMY      joint removal in right thumb      MINIMALLY INVASIVE TRANSFORAMINAL LUMBAR INTERBODY FUSION (TLIF) N/A 6/29/2021    Procedure: FUSION, SPINE, LUMBAR, TLIF, MINIMALLY INVASIVE;  Surgeon: Iraida Lozada MD;  Location: [facility] OR [address] [place] [address];  Service: Neurosurgery;  Laterality: N/A;  L4-5    SKIN CANCER EXCISION      SPINE SURGERY  9/16/14    L4/5 laminectomy;dr lozada    TOE SURGERY      LT FIFTH     Family History   Problem Relation Age of Onset    Heart disease Mother     Hypertension Mother     Cataracts Mother     Heart disease Father     Hypertension Father     Cataracts Father     Heart disease Brother 45    Heart disease Sister         A-fib    Heart disease Sister         heart bypass x 5    Kidney disease Brother 70    Heart disease Brother     Cancer Maternal Aunt         breast    Diabetes Maternal Grandmother     Diabetes Maternal Aunt     Hypertension Other         multiple family members    Cancer Other         multiple with skin cancer    Stroke Neg Hx     Melanoma Neg Hx     Hyperlipidemia Neg Hx      Social History     Socioeconomic History    Marital status:      Spouse name: AMBREEN    Number of children: 4   Tobacco Use    Smoking status: Never Smoker    Smokeless tobacco: Never Used   Substance and Sexual Activity    Alcohol use: No    Drug use: No    Sexual activity: Yes     Partners: Male     Birth control/protection: See Surgical Hx   Other Topics Concern    Are you pregnant or think you may be? No    Breast-feeding No   Social History Narrative    Wears a seatbelt.     Review of patient's allergies indicates:   Allergen Reactions    Meloxicam  "Swelling    Ace inhibitors      Other reaction(s): cough    Amlodipine      Leg swelling    Chlorthalidone      Other reaction(s): ? dizzy  Other reaction(s): ? dizzy    Codeine Nausea And Vomiting    Demerol (pf)  [meperidine (pf)]      Other reaction(s): Itching    Fosamax  [alendronate]      Other reaction(s): aches    Fosamax plus d  [alendronate-vitamin d3]      Other reaction(s): aches    Hydrochlorothiazide (bulk)      Other reaction(s): bad taste    Hydrocodone-acetaminophen      Other reaction(s): Stomach upset  Other reaction(s): Nausea    Ibandronate      Other reaction(s): aches  Other reaction(s): aches    Ibandronate sodium      Other reaction(s): Unknown    Losartan Diarrhea    Meperidine Itching    Opioids - morphine analogues     Opium     Statins-hmg-coa reductase inhibitors      Few statins intol    Toprol xl  [metoprolol succinate]      Other reaction(s): diarrhea  Other reaction(s): diarrhea    Tramadol     Trazodone Other (See Comments)     Dry mouth           Objective:   /73   Pulse 87   Ht 5' 9" (1.753 m)   Wt 59.8 kg (131 lb 13.4 oz)   BMI 19.47 kg/m²   Immunization History   Administered Date(s) Administered    COVID-19, MRNA, LN-S, PF (MODERNA FULL 0.5 ML DOSE) 01/05/2021, 02/02/2021    Influenza (FLUAD) - Quadrivalent - Adjuvanted - PF *Preferred* (65+) 02/23/2022    Influenza - High Dose - PF (65 years and older) 11/14/2012, 11/14/2012, 09/17/2014, 10/30/2017, 12/20/2018, 09/09/2019    Influenza - Trivalent (ADULT) 01/05/2010    Influenza A (H1N1) 2009 Monovalent - IM - PF 01/05/2010, 01/05/2010    Influenza Split 01/05/2010    Pneumococcal Conjugate - 13 Valent 08/04/2015    Pneumococcal Polysaccharide - 23 Valent 01/28/2015    Td (ADULT) 06/25/2008, 06/25/2008    Zoster 06/25/2008, 06/25/2008    Zoster Recombinant 05/13/2019, 05/13/2019, 05/13/2019, 09/09/2019, 09/09/2019, 09/09/2019              Physical Exam   Constitutional: She appears " well-developed. No distress.   HENT:   Head: Normocephalic and atraumatic.   Pulmonary/Chest: Effort normal. No respiratory distress.   Musculoskeletal:         General: No tenderness or deformity. Normal range of motion.      Comments: Patient rises from chair independently. Walks without assistance. Steady gait. Mild kyphosis    jose wrists, mcps, pips no synvoitis, no tenderness, no warmth, good rom  jose elbows shoulders no swelling or tenderness,full rom  jose knees no effusion, no warmth, no tenderness, full rom     Neurological: She is alert.   Skin: Skin is warm. Capillary refill takes less than 2 seconds. No rash noted. She is not diaphoretic. No erythema.   Psychiatric: Her behavior is normal. Judgment and thought content normal.   Vitals reviewed.        No results found for this or any previous visit (from the past 672 hour(s)).     No results found for: TBGOLDPLUS   Lab Results   Component Value Date    HEPAIGM Negative 07/19/2013    HEPBIGM Negative 07/19/2013    HEPCAB Negative 07/19/2013    Dexa 3.8.2021:  L1-L4  -1.8, FN -2.5, TH -1.9, -11.7% sig decline at hip. Osteoporosis    Assessment:       1. Age-related osteoporosis without current pathological fracture    2. Counseling on health promotion and disease prevention    3. Primary osteoarthritis involving multiple joints          Impression:     Osteoporosis scores on dexa march 2021.   History of fosamax therapy with reported MSK side effects.  Patient with GERD, would avoid oral bisphosphonates.  One dose of prolia in 2018, did not continue with series.   DEXA 2021 with osteoporotic values.   Reviewed dexa and discussed with patient. Described diagnosis of osteoporosis and possible therapy options. Patient verbalized understanding and agreed to plan.     Other specified counseling  Over 10 minutes spent regarding below topics:  - Immunization counseling done.  - Nutrition and exercise counseling.  - Medication counseling provided.     Med  monitoring  High normal calcium level on prior labs. GFR wnl, creatinine normal.   Plan:          Labs pending at time of appointment. Prior labs reviewed and Calcium, creatinine, and GFR levels have remained within normal limits. Okay to proceed with prolia today. I will review labs when they come back and if any adjustments need to be made I will address these with the patient.     Weight bearing activity as tolerated.  Caution to avoid falls     Continue vitamin D supplement 1,000-2,000 units daily.  Calcium in diet. No need for calcium supplement at this time given high-normal values    Resume prolia thearpy today (4/21/2022) and receive every 6 months. Discussed prolia therapy indefinitely with patient given risk of increased bone loss if discontinuing.   Follow up 6 months for prolia with CMP one week prior    Repeat Dexa scan 4/2023 to monitor prolia therapy     Casi Prieto PA-C  [facility]  Rheumatology       40 minutes of total time spent on the encounter, which includes face to face time and non-face to face time preparing to see the patient (eg, review of tests), Obtaining and/or reviewing separately obtained history, Documenting clinical information in the electronic or other health record, Independently interpreting results (not separately reported) and communicating results to the patient/family/caregiver, or Care coordination (not separately reported).

## 2022-04-21 NOTE — DISCHARGE INSTRUCTIONS
.[facility]  [address] [address]  [address] [address]  [phone] phone     [phone] fax  Hours of Operation: Monday- Friday 8:00am- 5:00pm  After hours phone  [phone]  Hematology / Oncology Physicians on call    Dr. Jayce Marcos      Nurse Practitioners:    BANDAR Lamar NP Phaon Dunbar, NP Jessica Porter, PA      Please don't hesitate to call if you have any concerns.

## 2022-04-21 NOTE — PROGRESS NOTES
Health Maintenance Due   Topic Date Due    TETANUS VACCINE  06/25/2018    COVID-19 Vaccine (3 - Booster for Moderna series) 07/02/2021     Updates were requested from care everywhere.  Chart was reviewed for overdue Proactive [facility] Encounters (CARLY) topics (CRS, Breast Cancer Screening, Eye exam)  Health Maintenance has been updated.  LINKS immunization registry triggered.  Immunizations were reconciled.

## 2022-05-13 ENCOUNTER — OFFICE VISIT (OUTPATIENT)
Dept: FAMILY MEDICINE | Facility: CLINIC | Age: 87
End: 2022-05-13
Payer: MEDICARE

## 2022-05-13 VITALS
TEMPERATURE: 98 F | WEIGHT: 131 LBS | DIASTOLIC BLOOD PRESSURE: 66 MMHG | BODY MASS INDEX: 27.5 KG/M2 | HEIGHT: 58 IN | SYSTOLIC BLOOD PRESSURE: 131 MMHG | HEART RATE: 72 BPM

## 2022-05-13 DIAGNOSIS — L30.4 INTERTRIGO: Primary | ICD-10-CM

## 2022-05-13 PROCEDURE — 1101F PR PT FALLS ASSESS DOC 0-1 FALLS W/OUT INJ PAST YR: ICD-10-PCS | Mod: CPTII,S$GLB,, | Performed by: INTERNAL MEDICINE

## 2022-05-13 PROCEDURE — 3288F FALL RISK ASSESSMENT DOCD: CPT | Mod: CPTII,S$GLB,, | Performed by: INTERNAL MEDICINE

## 2022-05-13 PROCEDURE — 1101F PT FALLS ASSESS-DOCD LE1/YR: CPT | Mod: CPTII,S$GLB,, | Performed by: INTERNAL MEDICINE

## 2022-05-13 PROCEDURE — 99999 PR PBB SHADOW E&M-EST. PATIENT-LVL III: ICD-10-PCS | Mod: PBBFAC,,, | Performed by: INTERNAL MEDICINE

## 2022-05-13 PROCEDURE — 1159F PR MEDICATION LIST DOCUMENTED IN MEDICAL RECORD: ICD-10-PCS | Mod: CPTII,S$GLB,, | Performed by: INTERNAL MEDICINE

## 2022-05-13 PROCEDURE — 1159F MED LIST DOCD IN RCRD: CPT | Mod: CPTII,S$GLB,, | Performed by: INTERNAL MEDICINE

## 2022-05-13 PROCEDURE — 1126F AMNT PAIN NOTED NONE PRSNT: CPT | Mod: CPTII,S$GLB,, | Performed by: INTERNAL MEDICINE

## 2022-05-13 PROCEDURE — 99213 OFFICE O/P EST LOW 20 MIN: CPT | Mod: S$GLB,,, | Performed by: INTERNAL MEDICINE

## 2022-05-13 PROCEDURE — 3288F PR FALLS RISK ASSESSMENT DOCUMENTED: ICD-10-PCS | Mod: CPTII,S$GLB,, | Performed by: INTERNAL MEDICINE

## 2022-05-13 PROCEDURE — 1126F PR PAIN SEVERITY QUANTIFIED, NO PAIN PRESENT: ICD-10-PCS | Mod: CPTII,S$GLB,, | Performed by: INTERNAL MEDICINE

## 2022-05-13 PROCEDURE — 99999 PR PBB SHADOW E&M-EST. PATIENT-LVL III: CPT | Mod: PBBFAC,,, | Performed by: INTERNAL MEDICINE

## 2022-05-13 PROCEDURE — 99213 PR OFFICE/OUTPT VISIT, EST, LEVL III, 20-29 MIN: ICD-10-PCS | Mod: S$GLB,,, | Performed by: INTERNAL MEDICINE

## 2022-05-13 RX ORDER — CLOTRIMAZOLE AND BETAMETHASONE DIPROPIONATE 10; .5 MG/ML; MG/ML
LOTION TOPICAL 2 TIMES DAILY
Qty: 30 ML | Refills: 0 | Status: SHIPPED | OUTPATIENT
Start: 2022-05-13 | End: 2023-03-15

## 2022-05-13 NOTE — PROGRESS NOTES
Assessment/Plan:    Problem List Items Addressed This Visit    None     Visit Diagnoses     Intertrigo    -  Primary  -start topical antifungal/steroid  -keep area clean and dry  -follow up if symptoms persist or worsen      Relevant Medications    clotrimazole-betamethasone (LOTRISONE) lotion          Follow up if symptoms worsen or fail to improve.    Alina Roe MD  _____________________________________________________________________________________________________________________________________________________    CC: Rash    HPI:    Patient is in clinic today as an established patient here for rash.    Rash: Patient complains of rash involving the bilateral breast folds and L groin region. Rash started several days ago. Appearance of rash at onset: Color of lesion(s): red. Rash has not changed over time Initial distribution.  Discomfort associated with rash: is pruritic.  Associated symptoms: none. Denies: abdominal pain, arthralgia and fever. Patient has not had previous evaluation of rash. Patient has not had previous treatment. Patient has not had contacts with similar rash. Patient has not identified precipitant. Patient has not had new exposures (soaps, lotions, laundry detergents, foods, medications, plants, insects or animals.)    No other new complaints today.     Past Medical History:  Past Medical History:   Diagnosis Date    Arthritis     hands    Benign hematuria     shingleton    Calcific tendinitis of left shoulder 1/27/2016    Cataract     Diverticulosis     colonoscopy 5/22/2012    Duodenal diverticulum     EGD 5/22/2012    GERD (gastroesophageal reflux disease)     Hemorrhoids     colonoscopy 5/22/2012    History of skin cancer     per patient s/p excision, no chemo or radiation, sees outside derm, Dr. Tinsley.    Hyperlipidemia     Hypertension     Impingement syndrome of left shoulder 1/27/2016    Osteoporosis     declines tx;11/17/16 phone note    Pneumonia     as a child     Sciatica     santhosh; dr castillo    Skin cancer     Dr. Noriega    Vitamin D deficiency disease      Past Surgical History:   Procedure Laterality Date    APPENDECTOMY      CARPAL TUNNEL RELEASE Bilateral     CATARACT EXTRACTION W/  INTRAOCULAR LENS IMPLANT Left 05/01/2019    CATARACT EXTRACTION W/  INTRAOCULAR LENS IMPLANT Right 05/15/2019    CHOLECYSTECTOMY      HYSTERECTOMY      joint removal in right thumb      MINIMALLY INVASIVE TRANSFORAMINAL LUMBAR INTERBODY FUSION (TLIF) N/A 6/29/2021    Procedure: FUSION, SPINE, LUMBAR, TLIF, MINIMALLY INVASIVE;  Surgeon: Iraida Lozada MD;  Location: [facility] OR [address] [place] [address];  Service: Neurosurgery;  Laterality: N/A;  L4-5    SKIN CANCER EXCISION      SPINE SURGERY  9/16/14    L4/5 laminectomy;dr lozada    TOE SURGERY      LT FIFTH     Review of patient's allergies indicates:   Allergen Reactions    Meloxicam Swelling    Ace inhibitors      Other reaction(s): cough    Amlodipine      Leg swelling    Chlorthalidone      Other reaction(s): ? dizzy  Other reaction(s): ? dizzy    Codeine Nausea And Vomiting    Demerol (pf)  [meperidine (pf)]      Other reaction(s): Itching    Fosamax  [alendronate]      Other reaction(s): aches    Fosamax plus d  [alendronate-vitamin d3]      Other reaction(s): aches    Hydrochlorothiazide (bulk)      Other reaction(s): bad taste    Hydrocodone-acetaminophen      Other reaction(s): Stomach upset  Other reaction(s): Nausea    Ibandronate      Other reaction(s): aches  Other reaction(s): aches    Ibandronate sodium      Other reaction(s): Unknown    Losartan Diarrhea    Meperidine Itching    Opioids - morphine analogues     Opium     Statins-hmg-coa reductase inhibitors      Few statins intol    Toprol xl  [metoprolol succinate]      Other reaction(s): diarrhea  Other reaction(s): diarrhea    Tramadol     Trazodone Other (See Comments)     Dry mouth     Social History     Tobacco Use    Smoking status: Never Smoker     Smokeless tobacco: Never Used   Substance Use Topics    Alcohol use: No    Drug use: No     Family History   Problem Relation Age of Onset    Heart disease Mother     Hypertension Mother     Cataracts Mother     Heart disease Father     Hypertension Father     Cataracts Father     Heart disease Brother 45    Heart disease Sister         A-fib    Heart disease Sister         heart bypass x 5    Kidney disease Brother 70    Heart disease Brother     Cancer Maternal Aunt         breast    Diabetes Maternal Grandmother     Diabetes Maternal Aunt     Hypertension Other         multiple family members    Cancer Other         multiple with skin cancer    Stroke Neg Hx     Melanoma Neg Hx     Hyperlipidemia Neg Hx      Current Outpatient Medications on File Prior to Visit   Medication Sig Dispense Refill    hydroCHLOROthiazide (MICROZIDE) 12.5 mg capsule TAKE 1 CAPSULE BY MOUTH EVERY DAY 90 capsule 3    mupirocin (BACTROBAN) 2 % ointment Apply topically 3 (three) times daily. 22 g 0    verapamiL (CALAN) 120 MG tablet TAKE 1 TABLET BY MOUTH TWICE DAILY 180 tablet 3     No current facility-administered medications on file prior to visit.       Review of Systems   Constitutional: Negative for chills, diaphoresis, fatigue and fever.   HENT: Negative for congestion, ear pain, postnasal drip, sinus pain and sore throat.    Eyes: Negative for pain and redness.   Respiratory: Negative for cough, chest tightness and shortness of breath.    Cardiovascular: Negative for chest pain and leg swelling.   Gastrointestinal: Negative for abdominal pain, constipation, diarrhea, nausea and vomiting.   Genitourinary: Negative for dysuria and hematuria.   Musculoskeletal: Negative for arthralgias and joint swelling.   Skin: Positive for rash.   Neurological: Negative for dizziness, syncope and headaches.       Vitals:    05/13/22 1027   BP: 131/66   Pulse: 72   Temp: 97.8 °F (36.6 °C)   Weight: 59.4 kg (131 lb)   Height:  "4' 10" (1.473 m)       Wt Readings from Last 3 Encounters:   05/13/22 59.4 kg (131 lb)   04/21/22 59.8 kg (131 lb 13.4 oz)   03/31/22 59.4 kg (131 lb)       Physical Exam  Constitutional:       General: She is not in acute distress.     Appearance: She is well-developed.   HENT:      Head: Normocephalic and atraumatic.   Pulmonary:      Effort: Pulmonary effort is normal. No respiratory distress.   Musculoskeletal:      Cervical back: Normal range of motion.   Skin:     Findings: Rash present.      Comments: Mild erythema at bilateral breast folds and L groin fold   Neurological:      Mental Status: She is alert and oriented to person, place, and time.   Psychiatric:         Behavior: Behavior normal.         Health Maintenance   Topic Date Due    TETANUS VACCINE  06/25/2018    Lipid Panel  09/23/2022    DEXA Scan  03/08/2023       "

## 2022-05-27 ENCOUNTER — PATIENT MESSAGE (OUTPATIENT)
Dept: FAMILY MEDICINE | Facility: CLINIC | Age: 87
End: 2022-05-27
Payer: MEDICARE

## 2022-07-22 ENCOUNTER — TELEPHONE (OUTPATIENT)
Dept: RHEUMATOLOGY | Facility: CLINIC | Age: 87
End: 2022-07-22
Payer: MEDICARE

## 2022-07-22 NOTE — TELEPHONE ENCOUNTER
Pa w/ pt in regards to rescheduling appointments on 10/21/22 being that the provider will be out of the office.      Pt vu and is aware of appointments time and date   [facility] PROFESSIONAL SERVICES  [facility] [address]   [address] [address]   Dept: [phone]   Dept Fax: [phone]   Loc: [phone]      Chief Complaint   Patient presents with    6 Month Follow-Up    Bradycardia     6 month f/u in patient with history of MV CAD, preserved EF, and CKD on HD. He states he still feels fine and denies chest pain, palpitations, sob, LISSET, lightheadedness, dizziness or syncope. Cardiologist:  Dr. Reji Green:   No fever, no chills, No fatigue or weight loss  Pulmonary:    No dyspnea, no wheezing  Cardiac:    Denies recent chest pain   GI:     No nausea or vomiting, no abdominal pain  Neuro:    No dizziness or light headedness  Musculoskeletal:  No recent active issues  Extremities:   No edema, good peripheral pulses      Past Medical History:   Diagnosis Date    ASCVD (arteriosclerotic cardiovascular disease)     Cancer ([facility] 11/2017    Dr. Nhi Paredes    CKD (chronic kidney disease) stage 3, GFR 30-59 ml/min ([facility])     see's Kirsty Jordan DM2 (diabetes mellitus, type 2) ([facility]     Dyslipidemia     Hemodialysis patient ([facility]     History of blood transfusion     Hyperlipidemia     Hypertension     Thyroid disease        No Known Allergies    Current Outpatient Medications   Medication Sig Dispense Refill    nitroGLYCERIN (NITROSTAT) 0.4 MG SL tablet up to max of 3 total doses. If no relief after 1 dose, call 911. (Patient taking differently: Place 0.4 mg under the tongue as needed up to max of 3 total doses.  If no relief after 1 dose, call 911.) 25 tablet 3    pantoprazole (PROTONIX) 40 MG tablet Take 1 tablet by mouth daily 30 tablet 3    torsemide (DEMADEX) 20 MG tablet Take 2 tablets by mouth daily 30 tablet 1    calcium acetate (PHOSLO) 667 MG capsule Take 1 capsule by mouth 3 times daily (with meals) 90 capsule 1    cloNIDine (CATAPRES) 0.1 MG tablet Take 2 tablets by mouth 2 times daily 60 tablet 3    doxazosin (CARDURA) 8 MG tablet Take 1 tablet by mouth 2 times daily 60 tablet 3    hydrALAZINE (APRESOLINE) 100 MG tablet Take 1 tablet by mouth 3 times daily 90 tablet 3    clopidogrel (PLAVIX) 75 MG tablet Take 1 tablet by mouth daily 30 tablet 3    insulin glargine (TOUJEO SOLOSTAR) 300 UNIT/ML injection pen Inject 10 Units into the skin daily      Blood Pressure Monitor KIT CHECK BP TWICE DAILY IF SBP >140 CALL PCP 1 kit 0    simvastatin (ZOCOR) 20 MG tablet Take 20 mg by mouth nightly      amLODIPine (NORVASC) 10 MG tablet Take 1 tablet by mouth daily 30 tablet 3    isosorbide mononitrate (IMDUR) 120 MG extended release tablet Take 1 tablet by mouth 2 times daily 60 tablet 5     No current facility-administered medications for this visit.         Social History     Socioeconomic History    Marital status: Single     Spouse name: None    Number of children: None    Years of education: 15    Highest education level: None   Occupational History    Occupation: factory work     [employer]er: [employer] resource strain: None    Food insecurity:     Worry: None     Inability: None    Transportation needs:     Medical: None     Non-medical: None   Tobacco Use    Smoking status: Never Smoker    Smokeless tobacco: Never Used   Substance and Sexual Activity    Alcohol use: No     Alcohol/week: 0.0 oz    Drug use: No    Sexual activity: Never   Lifestyle    Physical activity:     Days per week: None     Minutes per session: None    Stress: None   Relationships    Social connections:     Talks on phone: None     Gets together: None     Attends [employer] service: None     Active member of club or organization: None     Attends meetings of clubs or organizations: None     Relationship status: None    Intimate partner violence:     Fear of current or ex partner: None     Emotionally abused: None     Physically abused: None     Forced sexual activity: None   Other Topics Concern  None   Social History Narrative    None       Family History   Problem Relation Age of Onset    Cancer Father     Heart Disease Father     High Blood Pressure Mother        Blood pressure 138/84, pulse 72, height 5' 9\" (1.753 m), weight 240 lb (108.9 kg). General:   Well developed, well nourished  Lungs:   Clear to auscultation  Heart:    Normal S1 S2, No murmur, rubs, or gallops  Abdomen:   Soft, non tender, no organomegalies, positive bowel sounds  Extremities:   No edema, no cyanosis, good peripheral pulses  Neurological:   Awake, alert, oriented. No obvious focal deficits  Musculoskeletal:  No obvious deformities        Limited Echo: 3/17/18  Summary   Limited Echo   Left ventricle size is normal.   Mild concentric left ventricular hypertrophy.   Ejection fraction is visually estimated at 50-55%.   Mildly dilated left atrium.   Normal right ventricular size and function.   Small circumferential pericardial effusion without tamponade physiology.      Signature      ----------------------------------------------------------------   Electronically signed by Marco Antonio Odom MD (Interpreting  Mikayla Zaragoza) on 03/08/2018 at 07:49 PM        Diagnostic procedure: CA w/LHC & LV, Oximetry  Conclusions  Procedure Summary  NSTEMI  Multi-vessel Coronary Artery Disease. LAD - 80%  RCA - 70%  Lcx - 80%  LM - Mild ds  EF - 50%  Needs high risk sigmoid resection surgery. I will do multivessel PCI after surgical clearance. Signatures  Electronically signed by Radha Dias MD (Performing Physician) on 11/27/2017 at 10:41   Diagnosis Orders   1. Multi-vessel coronary artery stenosis     2. Coronary artery disease involving native coronary artery of native heart without angina pectoris     3. History of non-ST elevation myocardial infarction (NSTEMI)     4. ESRD (end stage renal disease) on dialysis ([facility]     5. Essential hypertension     6. Dyslipidemia, goal LDL below 100     7.  History of colon cancer         No

## 2022-08-17 DIAGNOSIS — I10 ESSENTIAL HYPERTENSION: ICD-10-CM

## 2022-08-17 RX ORDER — VERAPAMIL HYDROCHLORIDE 120 MG/1
TABLET, FILM COATED ORAL
Qty: 180 TABLET | Refills: 2 | Status: SHIPPED | OUTPATIENT
Start: 2022-08-17 | End: 2023-05-25

## 2022-08-17 NOTE — TELEPHONE ENCOUNTER
Refill Decision Note   Isamar Roe  is requesting a refill authorization.  Brief Assessment and Rationale for Refill:  Approve     Medication Therapy Plan:       Medication Reconciliation Completed: No   Comments:     No Care Gaps recommended.     Note composed:12:40 PM 08/17/2022

## 2022-08-17 NOTE — TELEPHONE ENCOUNTER
No new care gaps identified.  [facility] Embedded Care Gaps. Reference number: [id number]. 8/17/2022   10:28:30 AM CDT

## 2022-09-02 ENCOUNTER — PES CALL (OUTPATIENT)
Dept: ADMINISTRATIVE | Facility: CLINIC | Age: 87
End: 2022-09-02
Payer: MEDICARE

## 2022-09-12 ENCOUNTER — OFFICE VISIT (OUTPATIENT)
Dept: NEUROSURGERY | Facility: CLINIC | Age: 87
End: 2022-09-12
Payer: MEDICARE

## 2022-09-12 VITALS
WEIGHT: 130.94 LBS | HEART RATE: 74 BPM | TEMPERATURE: 97 F | HEIGHT: 58 IN | DIASTOLIC BLOOD PRESSURE: 66 MMHG | SYSTOLIC BLOOD PRESSURE: 152 MMHG | BODY MASS INDEX: 27.48 KG/M2

## 2022-09-12 DIAGNOSIS — M43.16 SPONDYLOLISTHESIS AT L4-L5 LEVEL: Primary | ICD-10-CM

## 2022-09-12 DIAGNOSIS — M54.16 LUMBAR RADICULOPATHY: ICD-10-CM

## 2022-09-12 PROCEDURE — 1126F PR PAIN SEVERITY QUANTIFIED, NO PAIN PRESENT: ICD-10-PCS | Mod: CPTII,S$GLB,, | Performed by: NEUROLOGICAL SURGERY

## 2022-09-12 PROCEDURE — 99213 OFFICE O/P EST LOW 20 MIN: CPT | Mod: S$GLB,,, | Performed by: NEUROLOGICAL SURGERY

## 2022-09-12 PROCEDURE — 99999 PR PBB SHADOW E&M-EST. PATIENT-LVL III: CPT | Mod: PBBFAC,,, | Performed by: NEUROLOGICAL SURGERY

## 2022-09-12 PROCEDURE — 1159F MED LIST DOCD IN RCRD: CPT | Mod: CPTII,S$GLB,, | Performed by: NEUROLOGICAL SURGERY

## 2022-09-12 PROCEDURE — 99999 PR PBB SHADOW E&M-EST. PATIENT-LVL III: ICD-10-PCS | Mod: PBBFAC,,, | Performed by: NEUROLOGICAL SURGERY

## 2022-09-12 PROCEDURE — 1159F PR MEDICATION LIST DOCUMENTED IN MEDICAL RECORD: ICD-10-PCS | Mod: CPTII,S$GLB,, | Performed by: NEUROLOGICAL SURGERY

## 2022-09-12 PROCEDURE — 3288F FALL RISK ASSESSMENT DOCD: CPT | Mod: CPTII,S$GLB,, | Performed by: NEUROLOGICAL SURGERY

## 2022-09-12 PROCEDURE — 3288F PR FALLS RISK ASSESSMENT DOCUMENTED: ICD-10-PCS | Mod: CPTII,S$GLB,, | Performed by: NEUROLOGICAL SURGERY

## 2022-09-12 PROCEDURE — 1160F PR REVIEW ALL MEDS BY PRESCRIBER/CLIN PHARMACIST DOCUMENTED: ICD-10-PCS | Mod: CPTII,S$GLB,, | Performed by: NEUROLOGICAL SURGERY

## 2022-09-12 PROCEDURE — 1160F RVW MEDS BY RX/DR IN RCRD: CPT | Mod: CPTII,S$GLB,, | Performed by: NEUROLOGICAL SURGERY

## 2022-09-12 PROCEDURE — 1101F PT FALLS ASSESS-DOCD LE1/YR: CPT | Mod: CPTII,S$GLB,, | Performed by: NEUROLOGICAL SURGERY

## 2022-09-12 PROCEDURE — 1101F PR PT FALLS ASSESS DOC 0-1 FALLS W/OUT INJ PAST YR: ICD-10-PCS | Mod: CPTII,S$GLB,, | Performed by: NEUROLOGICAL SURGERY

## 2022-09-12 PROCEDURE — 99213 PR OFFICE/OUTPT VISIT, EST, LEVL III, 20-29 MIN: ICD-10-PCS | Mod: S$GLB,,, | Performed by: NEUROLOGICAL SURGERY

## 2022-09-12 PROCEDURE — 1126F AMNT PAIN NOTED NONE PRSNT: CPT | Mod: CPTII,S$GLB,, | Performed by: NEUROLOGICAL SURGERY

## 2022-09-12 NOTE — PROGRESS NOTES
Neurosurgery  Established Patient    SUBJECTIVE:     History of Present Illness:  Ms. Roe is an 87-year-old female who is seeing me today in follow-up.  Her last neurosurgery clinic appointment was on March 31, 2022.  She was taken to the operating room in June 2021.  Preoperatively, she had a history of a left L4-5 minimally invasive laminotomy for resection of synovial cyst.  This was done in September 2014.  Prior to this procedure, she complained of low back pain and mainly left leg pain.  This resolved after surgery.  More recently, she complained of worsening low back pain with right lower extremity radiating pain.  Imaging studies showed an L4-5 spondylolisthesis with movement on flexion-extension x-rays.  After failing conservative therapy, she was taken to the operating room for an L4-5 TLIF.   Postoperatively, she did well with near complete resolution of her low back pain and leg pain.  At the time of her last clinic appointment, she complained of some increasing low back pain without leg pain, especially when she was up and exerting herself.  I sent her for physical therapy.  She is here today to see me in follow-up.  Currently, she is doing well.  She states that her back pain is much improved.  She still has some back pain when she does too much but this is much better than what it was at the time of her last clinic appointment.  She believes that the physical therapy helped and is no longer going because she feels she no longer needs therapy.      Review of patient's allergies indicates:   Allergen Reactions    Meloxicam Swelling    Ace inhibitors      Other reaction(s): cough    Amlodipine      Leg swelling    Chlorthalidone      Other reaction(s): ? dizzy  Other reaction(s): ? dizzy    Codeine Nausea And Vomiting    Demerol (pf)  [meperidine (pf)]      Other reaction(s): Itching    Fosamax  [alendronate]      Other reaction(s): aches    Fosamax plus d  [alendronate-vitamin d3]      Other  reaction(s): aches    Hydrochlorothiazide (bulk)      Other reaction(s): bad taste    Hydrocodone-acetaminophen      Other reaction(s): Stomach upset  Other reaction(s): Nausea    Ibandronate      Other reaction(s): aches  Other reaction(s): aches    Ibandronate sodium      Other reaction(s): Unknown    Losartan Diarrhea    Meperidine Itching    Opioids - morphine analogues     Opium     Statins-hmg-coa reductase inhibitors      Few statins intol    Toprol xl  [metoprolol succinate]      Other reaction(s): diarrhea  Other reaction(s): diarrhea    Tramadol     Trazodone Other (See Comments)     Dry mouth       Current Outpatient Medications   Medication Sig Dispense Refill    clotrimazole-betamethasone (LOTRISONE) lotion Apply topically 2 (two) times daily. 30 mL 0    hydroCHLOROthiazide (MICROZIDE) 12.5 mg capsule TAKE 1 CAPSULE BY MOUTH EVERY DAY 90 capsule 3    mupirocin (BACTROBAN) 2 % ointment Apply topically 3 (three) times daily. 22 g 0    verapamiL (CALAN) 120 MG tablet TAKE 1 TABLET BY MOUTH 2 TIMES A  tablet 2     No current facility-administered medications for this visit.       Past Medical History:   Diagnosis Date    Arthritis     hands    Benign hematuria     shingleton    Calcific tendinitis of left shoulder 1/27/2016    Cataract     Diverticulosis     colonoscopy 5/22/2012    Duodenal diverticulum     EGD 5/22/2012    GERD (gastroesophageal reflux disease)     Hemorrhoids     colonoscopy 5/22/2012    History of skin cancer     per patient s/p excision, no chemo or radiation, sees outside derm, Dr. Tinsley.    Hyperlipidemia     Hypertension     Impingement syndrome of left shoulder 1/27/2016    Osteoporosis     declines tx;11/17/16 phone note    Pneumonia     as a child    Sciatica     santhosh; dr castillo    Skin cancer     Dr. Noriega    Vitamin D deficiency disease      Past Surgical History:   Procedure Laterality Date    APPENDECTOMY      CARPAL TUNNEL RELEASE Bilateral     CATARACT EXTRACTION W/   "INTRAOCULAR LENS IMPLANT Left 05/01/2019    CATARACT EXTRACTION W/  INTRAOCULAR LENS IMPLANT Right 05/15/2019    CHOLECYSTECTOMY      HYSTERECTOMY      joint removal in right thumb      MINIMALLY INVASIVE TRANSFORAMINAL LUMBAR INTERBODY FUSION (TLIF) N/A 6/29/2021    Procedure: FUSION, SPINE, LUMBAR, TLIF, MINIMALLY INVASIVE;  Surgeon: Iraida Lozada MD;  Location: [facility] OR [address] [place] [address];  Service: Neurosurgery;  Laterality: N/A;  L4-5    SKIN CANCER EXCISION      SPINE SURGERY  9/16/14    L4/5 laminectomy;dr lozada    TOE SURGERY      LT FIFTH     Family History       Problem Relation (Age of Onset)    Cancer Maternal Aunt, Other    Cataracts Mother, Father    Diabetes Maternal Grandmother, Maternal Aunt    Heart disease Mother, Father, Brother (45), Sister, Sister, Brother    Hypertension Mother, Father, Other    Kidney disease Brother (70)          Social History     Socioeconomic History    Marital status:      Spouse name: AMBREEN    Number of children: 4   Tobacco Use    Smoking status: Never    Smokeless tobacco: Never   Substance and Sexual Activity    Alcohol use: No    Drug use: No    Sexual activity: Yes     Partners: Male     Birth control/protection: See Surgical Hx   Other Topics Concern    Are you pregnant or think you may be? No    Breast-feeding No   Social History Narrative    Wears a seatbelt.       Review of Systems    OBJECTIVE:     Vital Signs  Temp: 97.4 °F (36.3 °C)  Pulse: 74  BP: (!) 152/66  Pain Score: 0-No pain  Height: 4' 10" (147.3 cm)  Weight: 59.4 kg (130 lb 15.3 oz)  Body mass index is 27.37 kg/m².    Physical Exam:  Vitals reviewed.    Constitutional: She appears well-developed and well-nourished. No distress.     Eyes: Pupils are equal, round, and reactive to light. Conjunctivae and EOM are normal.     Cardiovascular: Normal rate, regular rhythm, normal pulses and no edema.     Abdominal: Soft. Bowel sounds are normal.     Skin: Skin displays no rash on trunk and no rash on extremities. " Skin displays no lesions on trunk and no lesions on extremities.     Psych/Behavior: She is alert. She is oriented to person, place, and time. She has a normal mood and affect.     Musculoskeletal: Gait is normal.        Neck: Range of motion is full. There is no tenderness. Muscle strength is 5/5. Tone is normal.        Back: Range of motion is full. There is no tenderness. Muscle strength is 5/5. Tone is normal.        Right Upper Extremities: Range of motion is full. There is no tenderness. Muscle strength is 5/5. Tone is normal.        Left Upper Extremities: Range of motion is full. There is no tenderness. Muscle strength is 5/5. Tone is normal.       Right Lower Extremities: Range of motion is full. There is no tenderness. Muscle strength is 5/5. Tone is normal.        Left Lower Extremities: Range of motion is full. There is no tenderness. Muscle strength is 5/5. Tone is normal.     Neurological:        Coordination: She has a normal Romberg Test, normal finger to nose coordination and normal tandem walking coordination.        Sensory: There is no sensory deficit in the trunk. There is no sensory deficit in the extremities.        DTRs: DTRs are DTRS NORMAL AND SYMMETRICnormal and symmetric. She displays no Babinski's sign on the right side. She displays no Babinski's sign on the left side.        Cranial nerves: Cranial nerve(s) II, III, IV, V, VI, VII, VIII, IX, X, XI and XII are intact.       Diagnostic Results:  She has no imaging studies available for review today.    ASSESSMENT/PLAN:     Ms. Roe is an 87-year-old female status post L4-5 TLIF in June 2021.  Imaging studies showed complete fusion at the L4-5 level.  She was having some increasing low back pain at the time of her last clinic appointment.  This is now resolved.  She is doing well.  At this point, there is no need for a neurosurgical follow-up.  I will see her on an as-needed basis.  She knows she can call with any further questions or  concerns in the meantime.        Note dictated with voice recognition software, please excuse any grammatical errors.

## 2022-09-14 ENCOUNTER — TELEPHONE (OUTPATIENT)
Dept: ADMINISTRATIVE | Facility: CLINIC | Age: 87
End: 2022-09-14
Payer: MEDICARE

## 2022-09-14 NOTE — TELEPHONE ENCOUNTER
Called pt, informed pt I was calling to remind pt of her in office EAWV on 9/16/22; clinic location provided to patient; pt confirmed appointment

## 2022-09-16 ENCOUNTER — OFFICE VISIT (OUTPATIENT)
Dept: FAMILY MEDICINE | Facility: CLINIC | Age: 87
End: 2022-09-16
Payer: MEDICARE

## 2022-09-16 VITALS
HEART RATE: 70 BPM | SYSTOLIC BLOOD PRESSURE: 132 MMHG | BODY MASS INDEX: 27.32 KG/M2 | TEMPERATURE: 97 F | WEIGHT: 130.13 LBS | HEIGHT: 58 IN | OXYGEN SATURATION: 97 % | DIASTOLIC BLOOD PRESSURE: 60 MMHG

## 2022-09-16 DIAGNOSIS — K21.9 HIATAL HERNIA WITH GASTROESOPHAGEAL REFLUX: ICD-10-CM

## 2022-09-16 DIAGNOSIS — M46.1 SACROILIITIS, NOT ELSEWHERE CLASSIFIED: ICD-10-CM

## 2022-09-16 DIAGNOSIS — M54.16 LUMBAR RADICULOPATHY: ICD-10-CM

## 2022-09-16 DIAGNOSIS — E78.2 MIXED HYPERLIPIDEMIA: ICD-10-CM

## 2022-09-16 DIAGNOSIS — M81.0 AGE-RELATED OSTEOPOROSIS WITHOUT CURRENT PATHOLOGICAL FRACTURE: Chronic | ICD-10-CM

## 2022-09-16 DIAGNOSIS — Z00.00 ANNUAL PHYSICAL EXAM: Primary | ICD-10-CM

## 2022-09-16 DIAGNOSIS — K44.9 HIATAL HERNIA WITH GASTROESOPHAGEAL REFLUX: ICD-10-CM

## 2022-09-16 DIAGNOSIS — M43.16 SPONDYLOLISTHESIS AT L4-L5 LEVEL: ICD-10-CM

## 2022-09-16 DIAGNOSIS — Z23 IMMUNIZATION DUE: ICD-10-CM

## 2022-09-16 DIAGNOSIS — I38 HEART VALVE REGURGITATION: ICD-10-CM

## 2022-09-16 DIAGNOSIS — I10 ESSENTIAL HYPERTENSION: ICD-10-CM

## 2022-09-16 DIAGNOSIS — I70.0 ARTERIOSCLEROSIS OF AORTA: ICD-10-CM

## 2022-09-16 PROCEDURE — G0008 FLU VACCINE - QUADRIVALENT - ADJUVANTED: ICD-10-PCS | Mod: S$GLB,,, | Performed by: NURSE PRACTITIONER

## 2022-09-16 PROCEDURE — 1170F FXNL STATUS ASSESSED: CPT | Mod: CPTII,S$GLB,, | Performed by: NURSE PRACTITIONER

## 2022-09-16 PROCEDURE — 1159F PR MEDICATION LIST DOCUMENTED IN MEDICAL RECORD: ICD-10-PCS | Mod: CPTII,S$GLB,, | Performed by: NURSE PRACTITIONER

## 2022-09-16 PROCEDURE — 3288F FALL RISK ASSESSMENT DOCD: CPT | Mod: CPTII,S$GLB,, | Performed by: NURSE PRACTITIONER

## 2022-09-16 PROCEDURE — 3288F PR FALLS RISK ASSESSMENT DOCUMENTED: ICD-10-PCS | Mod: CPTII,S$GLB,, | Performed by: NURSE PRACTITIONER

## 2022-09-16 PROCEDURE — 1170F PR FUNCTIONAL STATUS ASSESSED: ICD-10-PCS | Mod: CPTII,S$GLB,, | Performed by: NURSE PRACTITIONER

## 2022-09-16 PROCEDURE — 99499 UNLISTED E&M SERVICE: CPT | Mod: S$GLB,,, | Performed by: NURSE PRACTITIONER

## 2022-09-16 PROCEDURE — G0439 PR MEDICARE ANNUAL WELLNESS SUBSEQUENT VISIT: ICD-10-PCS | Mod: S$GLB,,, | Performed by: NURSE PRACTITIONER

## 2022-09-16 PROCEDURE — 1101F PT FALLS ASSESS-DOCD LE1/YR: CPT | Mod: CPTII,S$GLB,, | Performed by: NURSE PRACTITIONER

## 2022-09-16 PROCEDURE — 90694 VACC AIIV4 NO PRSRV 0.5ML IM: CPT | Mod: S$GLB,,, | Performed by: NURSE PRACTITIONER

## 2022-09-16 PROCEDURE — 1126F AMNT PAIN NOTED NONE PRSNT: CPT | Mod: CPTII,S$GLB,, | Performed by: NURSE PRACTITIONER

## 2022-09-16 PROCEDURE — 90694 FLU VACCINE - QUADRIVALENT - ADJUVANTED: ICD-10-PCS | Mod: S$GLB,,, | Performed by: NURSE PRACTITIONER

## 2022-09-16 PROCEDURE — 99999 PR PBB SHADOW E&M-EST. PATIENT-LVL IV: CPT | Mod: PBBFAC,,, | Performed by: NURSE PRACTITIONER

## 2022-09-16 PROCEDURE — G0008 ADMIN INFLUENZA VIRUS VAC: HCPCS | Mod: S$GLB,,, | Performed by: NURSE PRACTITIONER

## 2022-09-16 PROCEDURE — 99999 PR PBB SHADOW E&M-EST. PATIENT-LVL IV: ICD-10-PCS | Mod: PBBFAC,,, | Performed by: NURSE PRACTITIONER

## 2022-09-16 PROCEDURE — 1159F MED LIST DOCD IN RCRD: CPT | Mod: CPTII,S$GLB,, | Performed by: NURSE PRACTITIONER

## 2022-09-16 PROCEDURE — G0439 PPPS, SUBSEQ VISIT: HCPCS | Mod: S$GLB,,, | Performed by: NURSE PRACTITIONER

## 2022-09-16 PROCEDURE — 1126F PR PAIN SEVERITY QUANTIFIED, NO PAIN PRESENT: ICD-10-PCS | Mod: CPTII,S$GLB,, | Performed by: NURSE PRACTITIONER

## 2022-09-16 PROCEDURE — 99499 RISK ADDL DX/OHS AUDIT: ICD-10-PCS | Mod: S$GLB,,, | Performed by: NURSE PRACTITIONER

## 2022-09-16 PROCEDURE — 1101F PR PT FALLS ASSESS DOC 0-1 FALLS W/OUT INJ PAST YR: ICD-10-PCS | Mod: CPTII,S$GLB,, | Performed by: NURSE PRACTITIONER

## 2022-09-16 RX ORDER — DENOSUMAB 60 MG/ML
INJECTION SUBCUTANEOUS
COMMUNITY
Start: 2022-04-21 | End: 2023-05-02 | Stop reason: ALTCHOICE

## 2022-09-16 NOTE — PROGRESS NOTES
"  Isamar Roe presented for a  Medicare AWV and comprehensive Health Risk Assessment today. The following components were reviewed and updated:    Medical history  Family History  Social history  Allergies and Current Medications  Health Risk Assessment  Health Maintenance  Care Team         ** See Completed Assessments for Annual Wellness Visit within the encounter summary.**         The following assessments were completed:  Living Situation  CAGE  Depression Screening  Timed Get Up and Go  Whisper Test  Cognitive Function Screening  Nutrition Screening  ADL Screening  PAQ Screening    Review for Opioid Screening: Pt does not have Rx for Opioids (If patient has Rx list here)      Review for Substance Use Disorders: Patient does not use substance (If patient has Rx list here)       Vitals:    09/16/22 1053   BP: 132/60   Pulse: 70   Temp: 97.3 °F (36.3 °C)   SpO2: 97%   Weight: 59 kg (130 lb 1.6 oz)   Height: 4' 10" (1.473 m)     Body mass index is 27.19 kg/m².  Physical Exam  Vitals and nursing note reviewed.   Constitutional:       General: She is not in acute distress.     Appearance: She is well-developed.   HENT:      Head: Normocephalic and atraumatic.   Eyes:      Pupils: Pupils are equal, round, and reactive to light.   Cardiovascular:      Rate and Rhythm: Normal rate and regular rhythm.   Pulmonary:      Effort: Pulmonary effort is normal.      Breath sounds: Normal breath sounds.   Musculoskeletal:         General: Normal range of motion.      Cervical back: Normal range of motion and neck supple.   Skin:     General: Skin is warm and dry.      Findings: No rash.   Neurological:      Mental Status: She is alert and oriented to person, place, and time.   Psychiatric:         Judgment: Judgment normal.             Diagnoses and health risks identified today and associated recommendations/orders:    1. Annual physical exam  Stable and controlled   Continue medication as prescribed  Continue current " treatment plan as previously prescribed with your PCP      2. Immunization due    - Influenza (FLUAD) - Quadrivalent (Adjuvanted) *Preferred* (65+) (PF)    3. Arteriosclerosis of aorta  Stable and controlled   Continue medication as prescribed  Continue current treatment plan as previously prescribed with your PCP      4. Essential hypertension  Stable and controlled on verapamil, HCTZ  Continue medication as prescribed  Continue current treatment plan as previously prescribed with your PCP      5. Mixed hyperlipidemia  Stable and controlled on diet  Continue medication as prescribed  Continue current treatment plan as previously prescribed with your PCP      6. Heart valve regurgitation  Stable and controlled  Continue medication as prescribed  Continue current treatment plan as previously prescribed with your PCP      7. Sacroiliitis, not elsewhere classified  Stable and controlled   Continue medication as prescribed  Continue current treatment plan as previously prescribed with your PCP      8. Spondylolisthesis at L4-L5 level  Stable and controlled   Continue medication as prescribed  Continue current treatment plan as previously prescribed with your PCP      9. Age-related osteoporosis without current pathological fracture  Stable and controlled on prolia  Continue medication as prescribed  Continue current treatment plan as previously prescribed with your PCP      10. Lumbar radiculopathy  Stable and controlled   Continue medication as prescribed  Continue current treatment plan as previously prescribed with your PCP      11. Hiatal hernia with gastroesophageal reflux  Stable and controlled   Continue medication as prescribed  Continue current treatment plan as previously prescribed with your PCP          I offered to discuss end of life issues, including information on how to make advance directives that the patient could use to name someone who would make medical decisions on their behalf if they became too  ill to make themselves.    ___Patient declined - already done.    _x__Patient is interested, I provided paperwork and offered to discuss.      Provided Isamar with a 5-10 year written screening schedule and personal prevention plan. Recommendations were developed using the USPSTF age appropriate recommendations. Education, counseling, and referrals were provided as needed. After Visit Summary printed and given to patient which includes a list of additional screenings\tests needed.    No follow-ups on file.    Cata Samaniego NP

## 2022-10-20 ENCOUNTER — TELEPHONE (OUTPATIENT)
Dept: RHEUMATOLOGY | Facility: CLINIC | Age: 87
End: 2022-10-20
Payer: MEDICARE

## 2022-10-20 NOTE — TELEPHONE ENCOUNTER
Tayke w/ pt in regards to rescheduling upcoming lab appt and appt w/ provider.    Pt is aware of new appt info

## 2022-10-27 ENCOUNTER — LAB VISIT (OUTPATIENT)
Dept: LAB | Facility: HOSPITAL | Age: 87
End: 2022-10-27
Payer: MEDICARE

## 2022-10-27 DIAGNOSIS — M81.0 AGE-RELATED OSTEOPOROSIS WITHOUT CURRENT PATHOLOGICAL FRACTURE: ICD-10-CM

## 2022-10-27 LAB
ALBUMIN SERPL BCP-MCNC: 3.7 G/DL (ref 3.5–5.2)
ALP SERPL-CCNC: 75 U/L (ref 55–135)
ALT SERPL W/O P-5'-P-CCNC: 14 U/L (ref 10–44)
ANION GAP SERPL CALC-SCNC: 10 MMOL/L (ref 8–16)
AST SERPL-CCNC: 16 U/L (ref 10–40)
BILIRUB SERPL-MCNC: 0.6 MG/DL (ref 0.1–1)
BUN SERPL-MCNC: 16 MG/DL (ref 8–23)
CALCIUM SERPL-MCNC: 9.8 MG/DL (ref 8.7–10.5)
CHLORIDE SERPL-SCNC: 101 MMOL/L (ref 95–110)
CO2 SERPL-SCNC: 29 MMOL/L (ref 23–29)
CREAT SERPL-MCNC: 0.7 MG/DL (ref 0.5–1.4)
EST. GFR  (NO RACE VARIABLE): >60 ML/MIN/1.73 M^2
GLUCOSE SERPL-MCNC: 107 MG/DL (ref 70–110)
POTASSIUM SERPL-SCNC: 4.1 MMOL/L (ref 3.5–5.1)
PROT SERPL-MCNC: 6.9 G/DL (ref 6–8.4)
SODIUM SERPL-SCNC: 140 MMOL/L (ref 136–145)

## 2022-10-27 PROCEDURE — 80053 COMPREHEN METABOLIC PANEL: CPT

## 2022-10-27 PROCEDURE — 36415 COLL VENOUS BLD VENIPUNCTURE: CPT | Mod: PO

## 2022-11-02 ENCOUNTER — INFUSION (OUTPATIENT)
Dept: INFUSION THERAPY | Facility: HOSPITAL | Age: 87
End: 2022-11-02
Attending: INTERNAL MEDICINE
Payer: MEDICARE

## 2022-11-02 ENCOUNTER — OFFICE VISIT (OUTPATIENT)
Dept: RHEUMATOLOGY | Facility: CLINIC | Age: 87
End: 2022-11-02
Payer: MEDICARE

## 2022-11-02 VITALS
SYSTOLIC BLOOD PRESSURE: 137 MMHG | OXYGEN SATURATION: 96 % | DIASTOLIC BLOOD PRESSURE: 68 MMHG | TEMPERATURE: 98 F | RESPIRATION RATE: 18 BRPM | HEART RATE: 75 BPM

## 2022-11-02 VITALS
BODY MASS INDEX: 27.45 KG/M2 | HEART RATE: 88 BPM | SYSTOLIC BLOOD PRESSURE: 125 MMHG | DIASTOLIC BLOOD PRESSURE: 68 MMHG | RESPIRATION RATE: 16 BRPM | WEIGHT: 130.75 LBS | HEIGHT: 58 IN

## 2022-11-02 DIAGNOSIS — Z71.89 COUNSELING ON HEALTH PROMOTION AND DISEASE PREVENTION: ICD-10-CM

## 2022-11-02 DIAGNOSIS — M15.9 PRIMARY OSTEOARTHRITIS INVOLVING MULTIPLE JOINTS: ICD-10-CM

## 2022-11-02 DIAGNOSIS — M81.0 AGE-RELATED OSTEOPOROSIS WITHOUT CURRENT PATHOLOGICAL FRACTURE: Primary | ICD-10-CM

## 2022-11-02 PROCEDURE — 1159F PR MEDICATION LIST DOCUMENTED IN MEDICAL RECORD: ICD-10-PCS | Mod: CPTII,S$GLB,,

## 2022-11-02 PROCEDURE — 1101F PR PT FALLS ASSESS DOC 0-1 FALLS W/OUT INJ PAST YR: ICD-10-PCS | Mod: CPTII,S$GLB,,

## 2022-11-02 PROCEDURE — 1101F PT FALLS ASSESS-DOCD LE1/YR: CPT | Mod: CPTII,S$GLB,,

## 2022-11-02 PROCEDURE — 1160F RVW MEDS BY RX/DR IN RCRD: CPT | Mod: CPTII,S$GLB,,

## 2022-11-02 PROCEDURE — 63600175 PHARM REV CODE 636 W HCPCS: Mod: JG

## 2022-11-02 PROCEDURE — 3288F FALL RISK ASSESSMENT DOCD: CPT | Mod: CPTII,S$GLB,,

## 2022-11-02 PROCEDURE — 1126F PR PAIN SEVERITY QUANTIFIED, NO PAIN PRESENT: ICD-10-PCS | Mod: CPTII,S$GLB,,

## 2022-11-02 PROCEDURE — 99999 PR PBB SHADOW E&M-EST. PATIENT-LVL IV: ICD-10-PCS | Mod: PBBFAC,,,

## 2022-11-02 PROCEDURE — 1160F PR REVIEW ALL MEDS BY PRESCRIBER/CLIN PHARMACIST DOCUMENTED: ICD-10-PCS | Mod: CPTII,S$GLB,,

## 2022-11-02 PROCEDURE — 99214 PR OFFICE/OUTPT VISIT, EST, LEVL IV, 30-39 MIN: ICD-10-PCS | Mod: S$GLB,,,

## 2022-11-02 PROCEDURE — 1159F MED LIST DOCD IN RCRD: CPT | Mod: CPTII,S$GLB,,

## 2022-11-02 PROCEDURE — 99499 RISK ADDL DX/OHS AUDIT: ICD-10-PCS | Mod: S$GLB,,,

## 2022-11-02 PROCEDURE — 1126F AMNT PAIN NOTED NONE PRSNT: CPT | Mod: CPTII,S$GLB,,

## 2022-11-02 PROCEDURE — 96372 THER/PROPH/DIAG INJ SC/IM: CPT

## 2022-11-02 PROCEDURE — 99999 PR PBB SHADOW E&M-EST. PATIENT-LVL IV: CPT | Mod: PBBFAC,,,

## 2022-11-02 PROCEDURE — 99214 OFFICE O/P EST MOD 30 MIN: CPT | Mod: S$GLB,,,

## 2022-11-02 PROCEDURE — 3288F PR FALLS RISK ASSESSMENT DOCUMENTED: ICD-10-PCS | Mod: CPTII,S$GLB,,

## 2022-11-02 PROCEDURE — 99499 UNLISTED E&M SERVICE: CPT | Mod: S$GLB,,,

## 2022-11-02 RX ADMIN — DENOSUMAB 60 MG: 60 INJECTION SUBCUTANEOUS at 10:11

## 2022-11-02 NOTE — NURSING
.Injection given without difficulties.Bandaid applied. Patient instructed to stay in the clinic for 15 minutes. Patient verbalized understanding and will notify nurse with any complaints.

## 2022-11-02 NOTE — DISCHARGE INSTRUCTIONS
.[facility]  [address] [address] [address]  [address]  [phone] phone     [phone] fax  Hours of Operation: Monday- Friday 8:00am- 5:00pm  After hours phone  [phone]  Hematology / Oncology Physicians on call    Dr. Jayce Argueta      Nurse Practitioners:    Bria Mota, BANDAR Smith, BANDAR Fernandez, BANDAR Verma, BANDAR eBttencourt, DEACON Gomez      Please don't hesitate to call if you have any concerns.

## 2022-11-02 NOTE — NURSING
Prolia 60 mg q 6 months  Last dose given-4/21/22    Any invasive dental procedures in past 3 months or upcoming 3 months: denies    Last Rheumatology provider visit- Seen by Casi on 11/2/22    Recent labs? 10/27/22;     Lab Results   Component Value Date    CALCIUM 9.8 10/27/2022     Lab Results   Component Value Date    CREATININE 0.7 10/27/2022     Lab Results   Component Value Date    ESTGFRAFRICA >60 04/21/2022     Lab Results   Component Value Date    EGFRNONAA >60 04/21/2022     Lab Results   Component Value Date    [id number] 42 09/23/2021         Prolia 60 mg/ml administered SQ to left arm. Tolerated without any complaints. No adverse reaction noted or reported after 15 minutes of administration. No redness, swelling, or drainage noted to site. Pt instructed on signs and symptoms of reaction to report. Verbalizes understanding.

## 2022-11-02 NOTE — PROGRESS NOTES
RHEUMATOLOGY OUTPATIENT CLINIC NOTE    11/02/2022    Subjective:       Patient ID: Isamar Roe is a 87 y.o. female.    Chief Complaint: Osteoporosis    HPI    Isamar Roe is a 87 y.o. pleasant female here for rheumatology follow up for osteoporosis.     Still with occ lumbar back pain post back surgery. Denies radiation and no numbness/tingling LE.     No issues tolerating prolia. Takes vitamin-D daily unsure of dosage.  Denies any falls or fractures since last appointment.  Vitamin-D daily. Prior dental implants but no upcoming planned invasive dental work. She has received prolia in the past (2018) and tolerated well. States she did not continue therapy given back surgery.       Rheumatologic review of systems negative otherwise.    Physical exam mild tenderness to palpation lumbar spine, Able to rise from a chair independently.  Walks without assistance.  Steady gait.  Mild kyphosis.   Past Medical History:   Diagnosis Date    Arthritis     hands    Benign hematuria     shingleton    Calcific tendinitis of left shoulder 1/27/2016    Cataract     Diverticulosis     colonoscopy 5/22/2012    Duodenal diverticulum     EGD 5/22/2012    GERD (gastroesophageal reflux disease)     Hemorrhoids     colonoscopy 5/22/2012    History of skin cancer     per patient s/p excision, no chemo or radiation, sees outside derm, Dr. Tinsley.    Hyperlipidemia     Hypertension     Impingement syndrome of left shoulder 1/27/2016    Osteoporosis     declines tx;11/17/16 phone note    Pneumonia     as a child    Sciatica     santhosh; dr castillo    Skin cancer     Dr. Noriega    Vitamin D deficiency disease      Past Surgical History:   Procedure Laterality Date    APPENDECTOMY      CARPAL TUNNEL RELEASE Bilateral     CATARACT EXTRACTION W/  INTRAOCULAR LENS IMPLANT Left 05/01/2019    CATARACT EXTRACTION W/  INTRAOCULAR LENS IMPLANT Right 05/15/2019    CHOLECYSTECTOMY      HYSTERECTOMY      joint removal in right thumb      MINIMALLY  INVASIVE TRANSFORAMINAL LUMBAR INTERBODY FUSION (TLIF) N/A 6/29/2021    Procedure: FUSION, SPINE, LUMBAR, TLIF, MINIMALLY INVASIVE;  Surgeon: Iraida Lozada MD;  Location: [facility] OR [address] [place] [address];  Service: Neurosurgery;  Laterality: N/A;  L4-5    SKIN CANCER EXCISION      SPINE SURGERY  9/16/14    L4/5 laminectomy;dr lozada    TOE SURGERY      LT FIFTH     Family History   Problem Relation Age of Onset    Heart disease Mother     Hypertension Mother     Cataracts Mother     Heart disease Father     Hypertension Father     Cataracts Father     Heart disease Brother 45    Heart disease Sister         A-fib    Heart disease Sister         heart bypass x 5    Kidney disease Brother 70    Heart disease Brother     Cancer Maternal Aunt         breast    Diabetes Maternal Grandmother     Diabetes Maternal Aunt     Hypertension Other         multiple family members    Cancer Other         multiple with skin cancer    Stroke Neg Hx     Melanoma Neg Hx     Hyperlipidemia Neg Hx      Social History     Socioeconomic History    Marital status:      Spouse name: AMBREEN    Number of children: 4   Tobacco Use    Smoking status: Never    Smokeless tobacco: Never   Substance and Sexual Activity    Alcohol use: No    Drug use: No    Sexual activity: Yes     Partners: Male     Birth control/protection: See Surgical Hx   Other Topics Concern    Are you pregnant or think you may be? No    Breast-feeding No   Social History Narrative    Wears a seatbelt.     Review of patient's allergies indicates:   Allergen Reactions    Meloxicam Swelling    Ace inhibitors      Other reaction(s): cough    Amlodipine      Leg swelling    Chlorthalidone      Other reaction(s): ? dizzy  Other reaction(s): ? dizzy    Codeine Nausea And Vomiting    Demerol (pf)  [meperidine (pf)]      Other reaction(s): Itching    Fosamax  [alendronate]      Other reaction(s): aches    Fosamax plus d  [alendronate-vitamin d3]      Other reaction(s): aches    Hydrochlorothiazide  "(bulk)      Other reaction(s): bad taste    Hydrocodone-acetaminophen      Other reaction(s): Stomach upset  Other reaction(s): Nausea    Ibandronate      Other reaction(s): aches  Other reaction(s): aches    Ibandronate sodium      Other reaction(s): Unknown    Losartan Diarrhea    Meperidine Itching    Opioids - morphine analogues     Opium     Statins-hmg-coa reductase inhibitors      Few statins intol    Toprol xl  [metoprolol succinate]      Other reaction(s): diarrhea  Other reaction(s): diarrhea    Tramadol     Trazodone Other (See Comments)     Dry mouth           Objective:   /68   Pulse 88   Resp 16   Ht 4' 10" (1.473 m)   Wt 59.3 kg (130 lb 11.7 oz)   BMI 27.32 kg/m²   Immunization History   Administered Date(s) Administered    COVID-19, MRNA, LN-S, PF (MODERNA FULL 0.5 ML DOSE) 01/05/2021, 02/02/2021    Influenza (FLUAD) - Quadrivalent - Adjuvanted - PF *Preferred* (65+) 02/23/2022, 09/16/2022    Influenza - High Dose - PF (65 years and older) 11/14/2012, 11/14/2012, 09/17/2014, 10/30/2017, 12/20/2018, 09/09/2019    Influenza - Trivalent (ADULT) 01/05/2010    Influenza A (H1N1) 2009 Monovalent - IM - PF 01/05/2010, 01/05/2010    Influenza Split 01/05/2010    Pneumococcal Conjugate - 13 Valent 08/04/2015    Pneumococcal Polysaccharide - 23 Valent 01/28/2015    Td (ADULT) 06/25/2008, 06/25/2008    Zoster 06/25/2008, 06/25/2008    Zoster Recombinant 05/13/2019, 05/13/2019, 05/13/2019, 09/09/2019, 09/09/2019, 09/09/2019          Recent Results (from the past 672 hour(s))   Comprehensive Metabolic Panel    Collection Time: 10/27/22 10:14 AM   Result Value Ref Range    Sodium 140 136 - 145 mmol/L    Potassium 4.1 3.5 - 5.1 mmol/L    Chloride 101 95 - 110 mmol/L    CO2 29 23 - 29 mmol/L    Glucose 107 70 - 110 mg/dL    BUN 16 8 - 23 mg/dL    Creatinine 0.7 0.5 - 1.4 mg/dL    Calcium 9.8 8.7 - 10.5 mg/dL    Total Protein 6.9 6.0 - 8.4 g/dL    Albumin 3.7 3.5 - 5.2 g/dL    Total Bilirubin 0.6 0.1 - 1.0 " mg/dL    Alkaline Phosphatase 75 55 - 135 U/L    AST 16 10 - 40 U/L    ALT 14 10 - 44 U/L    Anion Gap 10 8 - 16 mmol/L    eGFR >60.0 >60 mL/min/1.73 m^2        No results found for: TBGOLDPLUS   Lab Results   Component Value Date    HEPAIGM Negative 07/19/2013    HEPBIGM Negative 07/19/2013    HEPCAB Negative 07/19/2013        Dexa 3.8.2021:  L1-L4  -1.8, FN -2.5, TH -1.9, -11.7% sig decline at hip. Osteoporosis    Assessment:       1. Age-related osteoporosis without current pathological fracture    2. Counseling on health promotion and disease prevention    3. Primary osteoarthritis involving multiple joints            Impression:   Osteoporosis   Osteoporosis scores on dexa march 2021.   History of fosamax therapy with reported MSK side effects.  Patient with GERD, would avoid oral bisphosphonates.  One dose of prolia in 2018, did not continue with series.   DEXA 2021 with osteoporotic values.   Reviewed dexa and discussed with patient. Described diagnosis of osteoporosis and possible therapy options. Patient verbalized understanding and agreed to plan.   Resumed Prolia 04/21/2022    Osteoarthritis   Doing well from a joint standpoint aside from continued low back pain status post low back surgery.    Other specified counseling  Over 10 minutes spent regarding below topics:  - Nutrition and exercise counseling.  - Medication counseling provided.     Med monitoring  Tolerating Prolia well, labs stable.    Plan:          Calcium 9.8, GFR greater than 60, creatinine 0.7.    Okay for Prolia today.      Weight bearing activity as tolerated.  Caution to avoid falls     Continue vitamin D supplement 1,000-2,000 units daily.  Calcium in diet. No need for calcium supplement at this time given high-normal values    Completed physical therapy for low back.  If worsening pain, discuss with orthopedic or back and spine.    Follow up 6 months for prolia with CMP, DEXA, vitamin-D one week prior    Casi Prieto,  LESLIE  [facility]  Rheumatology       30 minutes of total time spent on the encounter, which includes face to face time and non-face to face time preparing to see the patient (eg, review of tests), Obtaining and/or reviewing separately obtained history, Documenting clinical information in the electronic or other health record, Independently interpreting results (not separately reported) and communicating results to the patient/family/caregiver, or Care coordination (not separately reported).

## 2023-02-07 DIAGNOSIS — Z00.00 ENCOUNTER FOR MEDICARE ANNUAL WELLNESS EXAM: ICD-10-CM

## 2023-02-09 DIAGNOSIS — Z00.00 ENCOUNTER FOR MEDICARE ANNUAL WELLNESS EXAM: ICD-10-CM

## 2023-03-15 ENCOUNTER — OFFICE VISIT (OUTPATIENT)
Dept: FAMILY MEDICINE | Facility: CLINIC | Age: 88
End: 2023-03-15
Payer: MEDICARE

## 2023-03-15 VITALS
DIASTOLIC BLOOD PRESSURE: 77 MMHG | SYSTOLIC BLOOD PRESSURE: 126 MMHG | WEIGHT: 129 LBS | HEART RATE: 78 BPM | BODY MASS INDEX: 27.08 KG/M2 | TEMPERATURE: 98 F | HEIGHT: 58 IN

## 2023-03-15 DIAGNOSIS — E78.2 MIXED HYPERLIPIDEMIA: ICD-10-CM

## 2023-03-15 DIAGNOSIS — M81.0 AGE-RELATED OSTEOPOROSIS WITHOUT CURRENT PATHOLOGICAL FRACTURE: Chronic | ICD-10-CM

## 2023-03-15 DIAGNOSIS — M43.16 SPONDYLOLISTHESIS AT L4-L5 LEVEL: ICD-10-CM

## 2023-03-15 DIAGNOSIS — Z79.899 ENCOUNTER FOR LONG-TERM (CURRENT) USE OF HIGH-RISK MEDICATION: ICD-10-CM

## 2023-03-15 DIAGNOSIS — I10 ESSENTIAL HYPERTENSION: Primary | ICD-10-CM

## 2023-03-15 DIAGNOSIS — I70.0 ARTERIOSCLEROSIS OF AORTA: ICD-10-CM

## 2023-03-15 PROCEDURE — 99214 OFFICE O/P EST MOD 30 MIN: CPT | Mod: HCNC,S$GLB,, | Performed by: INTERNAL MEDICINE

## 2023-03-15 PROCEDURE — 99999 PR PBB SHADOW E&M-EST. PATIENT-LVL III: ICD-10-PCS | Mod: PBBFAC,HCNC,, | Performed by: INTERNAL MEDICINE

## 2023-03-15 PROCEDURE — 1126F AMNT PAIN NOTED NONE PRSNT: CPT | Mod: HCNC,CPTII,S$GLB, | Performed by: INTERNAL MEDICINE

## 2023-03-15 PROCEDURE — 3288F FALL RISK ASSESSMENT DOCD: CPT | Mod: HCNC,CPTII,S$GLB, | Performed by: INTERNAL MEDICINE

## 2023-03-15 PROCEDURE — 99214 PR OFFICE/OUTPT VISIT, EST, LEVL IV, 30-39 MIN: ICD-10-PCS | Mod: HCNC,S$GLB,, | Performed by: INTERNAL MEDICINE

## 2023-03-15 PROCEDURE — 1126F PR PAIN SEVERITY QUANTIFIED, NO PAIN PRESENT: ICD-10-PCS | Mod: HCNC,CPTII,S$GLB, | Performed by: INTERNAL MEDICINE

## 2023-03-15 PROCEDURE — 1159F PR MEDICATION LIST DOCUMENTED IN MEDICAL RECORD: ICD-10-PCS | Mod: HCNC,CPTII,S$GLB, | Performed by: INTERNAL MEDICINE

## 2023-03-15 PROCEDURE — 1101F PR PT FALLS ASSESS DOC 0-1 FALLS W/OUT INJ PAST YR: ICD-10-PCS | Mod: HCNC,CPTII,S$GLB, | Performed by: INTERNAL MEDICINE

## 2023-03-15 PROCEDURE — 99999 PR PBB SHADOW E&M-EST. PATIENT-LVL III: CPT | Mod: PBBFAC,HCNC,, | Performed by: INTERNAL MEDICINE

## 2023-03-15 PROCEDURE — 1101F PT FALLS ASSESS-DOCD LE1/YR: CPT | Mod: HCNC,CPTII,S$GLB, | Performed by: INTERNAL MEDICINE

## 2023-03-15 PROCEDURE — 3288F PR FALLS RISK ASSESSMENT DOCUMENTED: ICD-10-PCS | Mod: HCNC,CPTII,S$GLB, | Performed by: INTERNAL MEDICINE

## 2023-03-15 PROCEDURE — 1159F MED LIST DOCD IN RCRD: CPT | Mod: HCNC,CPTII,S$GLB, | Performed by: INTERNAL MEDICINE

## 2023-03-15 RX ORDER — KETOCONAZOLE 20 MG/G
CREAM TOPICAL
COMMUNITY
Start: 2023-02-02 | End: 2023-08-16

## 2023-03-15 RX ORDER — TRIAMCINOLONE ACETONIDE 1 MG/G
CREAM TOPICAL
COMMUNITY
Start: 2023-02-02 | End: 2023-08-16

## 2023-03-15 NOTE — PROGRESS NOTES
Assessment/Plan:    Problem List Items Addressed This Visit          Cardiac/Vascular    Arteriosclerosis of aorta    Overview     -CT Abdomen/Pelvis 4/2/2012---Arteriosclerotic disease in the aorta and iliac arteries  -hx of statin intolerance         Essential hypertension - Primary    Overview     -at goal today  -currently on HCTZ 12.5 mg QD, verapamil 120 mg BID  -Did not tolerate ACE, Norvasc, Chlorothiadone in the past, per last PCP notes  -continue lifestyle modification with low sodium diet and exercise   -discussed hypertension disease course and importance of treating high blood pressure  -patient understood and advised of risk of untreated blood pressure.  ER precautions were given   for symptoms of hypertensive urgency and emergency.         Mixed hyperlipidemia    Overview     -chronic condition. Currently stable.    -hx of statin intolerance  -taking daily fish oil  -most recent labs listed below; repeat labs scheduled  Lab Results   Component Value Date    CHOL 259 (H) 09/23/2021     Lab Results   Component Value Date    HDL 64 09/23/2021     Lab Results   Component Value Date    LDLCALC 163.2 (H) 09/23/2021     Lab Results   Component Value Date    TRIG 159 (H) 09/23/2021     Lab Results   Component Value Date    ALT 14 10/27/2022    AST 16 10/27/2022    ALKPHOS 75 10/27/2022    BILITOT 0.6 10/27/2022             Orthopedic    Osteoporosis (Chronic)    Overview     -last DEXA showing worsening osteoporosis  -intolerant of bisphonates  -managed by rheumatology   -receiving prolia injections every 6 months  -continue weight bearing exercises  -repeat DEXA scheduled in April         Spondylolisthesis at L4-L5 level    Overview     -status post L4-5 TLIF in June 2021  -followed by neurosurgery          Other Visit Diagnoses       Encounter for long-term (current) use of high-risk medication        Relevant Orders    CBC Without Differential            Follow up in about 1 year (around  3/15/2024).    Alina Roe MD  _____________________________________________________________________________________________________________________________________________________    CC: follow up of chronic medical conditions     HPI:    Patient is in clinic today as an established patient.    HTN: The patient is currently being treated for essential hypertension. This condition is chronic and stable. The patient is tolerating their medication well with good compliance.  Denies any adverse effects of medications.  Counseling was offered regarding low sodium diet.  The patient has a reduced salt intake. Routine exercise recommended. The patient denies headache, vision changes, chest pain, palpitations, shortness of breath, or lower extremity edema.    No other new complaints today.  Remaining chronic conditions have been reviewed and remain stable. Further detail as stated above.     HM reviewed. Labs ordered.    No recent changes to medical/surgical history.    Current Outpatient Medications on File Prior to Visit   Medication Sig Dispense Refill    hydroCHLOROthiazide (MICROZIDE) 12.5 mg capsule TAKE 1 CAPSULE BY MOUTH EVERY DAY 90 capsule 1    ketoconazole (NIZORAL) 2 % cream Apply topically.      triamcinolone acetonide 0.1% (KENALOG) 0.1 % cream Apply topically.      verapamiL (CALAN) 120 MG tablet TAKE 1 TABLET BY MOUTH 2 TIMES A  tablet 2    PROLIA 60 mg/mL Syrg        No current facility-administered medications on file prior to visit.       Review of Systems   Constitutional:  Negative for chills, diaphoresis, fatigue and fever.   HENT:  Negative for congestion, ear pain, postnasal drip, sinus pain and sore throat.    Eyes:  Negative for pain and redness.   Respiratory:  Negative for cough, chest tightness and shortness of breath.    Cardiovascular:  Negative for chest pain and leg swelling.   Gastrointestinal:  Negative for abdominal pain, constipation, diarrhea, nausea and vomiting.  "  Genitourinary:  Negative for dysuria and hematuria.   Musculoskeletal:  Negative for arthralgias and joint swelling.   Skin:  Negative for rash.   Neurological:  Negative for dizziness, syncope and headaches.   Psychiatric/Behavioral:  Negative for dysphoric mood. The patient is not nervous/anxious.      Vitals:    03/15/23 1113   BP: 126/77   Pulse: 78   Temp: 98.1 °F (36.7 °C)   TempSrc: Temporal   Weight: 58.5 kg (129 lb)   Height: 4' 10" (1.473 m)       Wt Readings from Last 3 Encounters:   03/15/23 58.5 kg (129 lb)   11/02/22 59.3 kg (130 lb 11.7 oz)   09/16/22 59 kg (130 lb 1.6 oz)       Physical Exam  Constitutional:       General: She is not in acute distress.     Appearance: Normal appearance. She is well-developed.   HENT:      Head: Normocephalic and atraumatic.   Eyes:      Conjunctiva/sclera: Conjunctivae normal.   Cardiovascular:      Rate and Rhythm: Normal rate and regular rhythm.      Pulses: Normal pulses.      Heart sounds: Normal heart sounds. No murmur heard.  Pulmonary:      Effort: Pulmonary effort is normal. No respiratory distress.      Breath sounds: Normal breath sounds.   Abdominal:      General: Bowel sounds are normal. There is no distension.      Palpations: Abdomen is soft.      Tenderness: There is no abdominal tenderness.   Musculoskeletal:         General: Normal range of motion.      Cervical back: Normal range of motion and neck supple.   Skin:     General: Skin is warm and dry.      Findings: No rash.   Neurological:      General: No focal deficit present.      Mental Status: She is alert and oriented to person, place, and time.   Psychiatric:         Mood and Affect: Mood normal.         Behavior: Behavior normal.       Health Maintenance   Topic Date Due    TETANUS VACCINE  06/25/2018    Lipid Panel  09/23/2022    DEXA Scan  03/08/2023     "

## 2023-03-16 PROBLEM — M46.1 SACROILIITIS, NOT ELSEWHERE CLASSIFIED: Status: RESOLVED | Noted: 2019-05-30 | Resolved: 2023-03-16

## 2023-04-14 ENCOUNTER — TELEPHONE (OUTPATIENT)
Dept: ADMINISTRATIVE | Facility: HOSPITAL | Age: 88
End: 2023-04-14
Payer: MEDICARE

## 2023-04-25 ENCOUNTER — HOSPITAL ENCOUNTER (OUTPATIENT)
Dept: RADIOLOGY | Facility: HOSPITAL | Age: 88
Discharge: HOME | End: 2023-04-25
Payer: MEDICARE

## 2023-04-25 DIAGNOSIS — M81.0 AGE-RELATED OSTEOPOROSIS WITHOUT CURRENT PATHOLOGICAL FRACTURE: ICD-10-CM

## 2023-04-25 PROCEDURE — 77080 DXA BONE DENSITY AXIAL: CPT | Mod: 26,HCNC,, | Performed by: RADIOLOGY

## 2023-04-25 PROCEDURE — 77080 DEXA BONE DENSITY SPINE HIP: ICD-10-PCS | Mod: 26,HCNC,, | Performed by: RADIOLOGY

## 2023-04-25 PROCEDURE — 77080 DXA BONE DENSITY AXIAL: CPT | Mod: TC,HCNC,PO

## 2023-04-27 ENCOUNTER — TELEPHONE (OUTPATIENT)
Dept: RHEUMATOLOGY | Facility: CLINIC | Age: 88
End: 2023-04-27
Payer: MEDICARE

## 2023-04-27 NOTE — TELEPHONE ENCOUNTER
Spke to pt regarding canceled f/u appt w/ Casi Prieto PA-C.    Pt stated she doesn't want to take the prolia injection anymore. Every since she started taking it she formed two knots on the top of  her feet.    I asked pt can we get her scheduled to come in clinic to let Casi take a look at her feet and possible discuss alternative trx.    Pt vu and accepted appt

## 2023-04-27 NOTE — TELEPHONE ENCOUNTER
----- Message from Casi Prieto PA-C sent at 4/27/2023 11:14 AM CDT -----  Needs appointment with me rescheduled.  I see were my appointment was canceled but Prolia still scheduled.

## 2023-05-01 NOTE — PROGRESS NOTES
RHEUMATOLOGY OUTPATIENT CLINIC NOTE    05/02/2023    Subjective:       Patient ID: Isamar Roe is a 88 y.o. female.    Chief Complaint: Osteoporosis      HPI    Isamar Roe is a 88 y.o. pleasant female here for rheumatology follow up for osteoporosis.     She comes in today to review her bone density scan and discussed osteoporosis treatment.    She wishes to discontinue Prolia she has a hard lump on the dorsal aspect of her left foot which she says was not there prior to starting Prolia.  It is painful and limits her footwear.    Denies any falls or fractures since last appointment.    Still with occ lumbar back pain post back surgery. Denies radiation and no numbness/tingling LE.     Takes vitamin-D daily unsure of dosage.  Denies any falls or fractures since last appointment.  Vitamin-D daily. Prior dental implants but no upcoming planned invasive dental work. She has received prolia in the past (2018) and tolerated well. States she did not continue therapy given back surgery.       Rheumatologic review of systems negative otherwise.    Physical exam  Hard immobile nodule on dorsal left foot, mildly erythematous but swelling increased warmth, no tenderness to palpation. Able to rise from a chair independently.  Walks without assistance.  Steady gait.  Mild kyphosis.   Past Medical History:   Diagnosis Date    Arthritis     hands    Benign hematuria     [place]    Calcific tendinitis of left shoulder 1/27/2016    Cataract     Diverticulosis     colonoscopy 5/22/2012    Duodenal diverticulum     EGD 5/22/2012    GERD (gastroesophageal reflux disease)     Hemorrhoids     colonoscopy 5/22/2012    History of skin cancer     per patient s/p excision, no chemo or radiation, sees outside derm, Dr. Tinsley.    Hyperlipidemia     Hypertension     Impingement syndrome of left shoulder 1/27/2016    Osteoporosis     declines tx;11/17/16 phone note    Pneumonia     as a child    Sciatica     [place]; dr castillo     Skin cancer     Dr. Noriega    Vitamin D deficiency disease      Past Surgical History:   Procedure Laterality Date    APPENDECTOMY      CARPAL TUNNEL RELEASE Bilateral     CATARACT EXTRACTION W/  INTRAOCULAR LENS IMPLANT Left 05/01/2019    CATARACT EXTRACTION W/  INTRAOCULAR LENS IMPLANT Right 05/15/2019    CHOLECYSTECTOMY      HYSTERECTOMY      joint removal in right thumb      MINIMALLY INVASIVE TRANSFORAMINAL LUMBAR INTERBODY FUSION (TLIF) N/A 6/29/2021    Procedure: FUSION, SPINE, LUMBAR, TLIF, MINIMALLY INVASIVE;  Surgeon: Iraida Lozada MD;  Location: [facility] OR [address] [place] [address];  Service: Neurosurgery;  Laterality: N/A;  L4-5    SKIN CANCER EXCISION      SPINE SURGERY  9/16/14    L4/5 laminectomy;dr lozada    TOE SURGERY      LT FIFTH     Family History   Problem Relation Age of Onset    Heart disease Mother     Hypertension Mother     Cataracts Mother     Heart disease Father     Hypertension Father     Cataracts Father     Heart disease Brother 45    Heart disease Sister         A-fib    Heart disease Sister         heart bypass x 5    Kidney disease Brother 70    Heart disease Brother     Cancer Maternal Aunt         breast    Diabetes Maternal Grandmother     Diabetes Maternal Aunt     Hypertension Other         multiple family members    Cancer Other         multiple with skin cancer    Stroke Neg Hx     Melanoma Neg Hx     Hyperlipidemia Neg Hx      Social History     Socioeconomic History    Marital status:      Spouse name: AMBREEN    Number of children: 4   Tobacco Use    Smoking status: Never    Smokeless tobacco: Never   Substance and Sexual Activity    Alcohol use: No    Drug use: No    Sexual activity: Yes     Partners: Male     Birth control/protection: See Surgical Hx   Other Topics Concern    Are you pregnant or think you may be? No    Breast-feeding No   Social History Narrative    Wears a seatbelt.     Review of patient's allergies indicates:   Allergen Reactions    Meloxicam Swelling    Ace  "inhibitors      Other reaction(s): cough    Amlodipine      Leg swelling    Chlorthalidone      Other reaction(s): ? dizzy  Other reaction(s): ? dizzy    Codeine Nausea And Vomiting    Demerol (pf)  [meperidine (pf)]      Other reaction(s): Itching    Fosamax  [alendronate]      Other reaction(s): aches    Fosamax plus d  [alendronate-vitamin d3]      Other reaction(s): aches    Hydrochlorothiazide (bulk)      Other reaction(s): bad taste    Hydrocodone-acetaminophen      Other reaction(s): Stomach upset  Other reaction(s): Nausea    Ibandronate      Other reaction(s): aches  Other reaction(s): aches    Ibandronate sodium      Other reaction(s): Unknown    Losartan Diarrhea    Meperidine Itching    Opioids - morphine analogues     Opium     Statins-hmg-coa reductase inhibitors      Few statins intol    Toprol xl  [metoprolol succinate]      Other reaction(s): diarrhea  Other reaction(s): diarrhea    Tramadol     Trazodone Other (See Comments)     Dry mouth           Objective:   BP (!) 176/76 (BP Location: Right arm, Patient Position: Sitting, BP Method: Medium (Automatic))   Pulse 71   Resp 16   Ht 4' 10" (1.473 m)   Wt 58.8 kg (129 lb 10.1 oz)   BMI 27.09 kg/m²   Immunization History   Administered Date(s) Administered    COVID-19, MRNA, LN-S, PF (MODERNA FULL 0.5 ML DOSE) 01/05/2021, 02/02/2021    Influenza (FLUAD) - Quadrivalent - Adjuvanted - PF *Preferred* (65+) 02/23/2022, 09/16/2022    Influenza - High Dose - PF (65 years and older) 11/14/2012, 11/14/2012, 09/17/2014, 10/30/2017, 12/20/2018, 09/09/2019    Influenza - Trivalent (ADULT) 01/05/2010    Influenza A (H1N1) 2009 Monovalent - IM - PF 01/05/2010, 01/05/2010    Influenza Split 01/05/2010    Pneumococcal Conjugate - 13 Valent 08/04/2015    Pneumococcal Polysaccharide - 23 Valent 01/28/2015    Td (ADULT) 06/25/2008, 06/25/2008    Zoster 06/25/2008, 06/25/2008    Zoster Recombinant 05/13/2019, 05/13/2019, 05/13/2019, 09/09/2019, 09/09/2019, " 09/09/2019          Recent Results (from the past 672 hour(s))   Lipid Panel    Collection Time: 04/25/23  9:46 AM   Result Value Ref Range    Cholesterol 268 (H) 120 - 199 mg/dL    Triglycerides 91 30 - 150 mg/dL    HDL 69 40 - 75 mg/dL    LDL Cholesterol 180.8 (H) 63.0 - 159.0 mg/dL    HDL/Cholesterol Ratio 25.7 20.0 - 50.0 %    Total Cholesterol/HDL Ratio 3.9 2.0 - 5.0    Non-HDL Cholesterol 199 mg/dL   Comprehensive Metabolic Panel    Collection Time: 04/25/23  9:46 AM   Result Value Ref Range    Sodium 141 136 - 145 mmol/L    Potassium 4.0 3.5 - 5.1 mmol/L    Chloride 104 95 - 110 mmol/L    CO2 31 (H) 23 - 29 mmol/L    Glucose 89 70 - 110 mg/dL    BUN 21 8 - 23 mg/dL    Creatinine 0.7 0.5 - 1.4 mg/dL    Calcium 9.6 8.7 - 10.5 mg/dL    Total Protein 7.1 6.0 - 8.4 g/dL    Albumin 4.0 3.5 - 5.2 g/dL    Total Bilirubin 0.6 0.1 - 1.0 mg/dL    Alkaline Phosphatase 71 55 - 135 U/L    AST 16 10 - 40 U/L    ALT 13 10 - 44 U/L    Anion Gap 6 (L) 8 - 16 mmol/L    eGFR >60.0 >60 mL/min/1.73 m^2   Vitamin D    Collection Time: 04/25/23  9:46 AM   Result Value Ref Range    Vit D, 25-Hydroxy 42 30 - 96 ng/mL   CBC Without Differential    Collection Time: 04/25/23  9:46 AM   Result Value Ref Range    WBC 6.57 3.90 - 12.70 K/uL    RBC 4.91 4.00 - 5.40 M/uL    Hemoglobin 14.4 12.0 - 16.0 g/dL    Hematocrit 44.2 37.0 - 48.5 %    MCV 90 82 - 98 fL    MCH 29.3 27.0 - 31.0 pg    MCHC 32.6 32.0 - 36.0 g/dL    RDW 13.5 11.5 - 14.5 %    Platelets 293 150 - 450 K/uL    MPV 8.7 (L) 9.2 - 12.9 fL        No results found for: TBGOLDPLUS   Lab Results   Component Value Date    HEPAIGM Negative 07/19/2013    HEPBIGM Negative 07/19/2013    HEPCAB Negative 07/19/2013        Dexa 3.8.2021:  L1-L4  -1.8, FN -2.5, TH -1.9, -11.7% sig decline at hip. Osteoporosis    DEXA 04/25/2023:   Spine -2.0, FN-2.2, TH -1.9, stable osteopenia, major 15.2, hip fracture 5.3    Assessment:       1. Age-related osteoporosis without current pathological fracture     2. Left foot pain    3. Counseling on health promotion and disease prevention    4. Primary osteoarthritis involving multiple joints              Impression:   Osteoporosis   Osteoporosis scores on dexa march 2021.   History of fosamax therapy with reported MSK side effects.  Patient with GERD, would avoid oral bisphosphonates.  One dose of prolia in 2018, did not continue with series.   DEXA 2021 with osteoporotic values.   Reviewed dexa and discussed with patient. Described diagnosis of osteoporosis and possible therapy options. Patient verbalized understanding and agreed to plan.   Resumed Prolia 04/21/2022.  Last dose 11/02/2022-stable bone density on DEXA 04/20/2023  Patient with hard nodule on left foot which she feels is related to Prolia.  Likely unrelated to medication, however if patient does not want to continue Prolia, would rather discontinue now rather than down the road and risk decline in BMD with discontinuation.    Prior history of Fosamax per chart review many years ago. Patient does not recall taking this medication. Kidney function within acceptable range.  Agrees to do trial of fosamax.     Foot pain   New problem.  Hard mobile nodule on dorsal aspect of left foot.  Likely unrelated to Prolia therapy.  No prior foot x-ray on file    Osteoarthritis   Doing well from a joint standpoint aside from continued low back pain status post low back surgery.    Other specified counseling  Over 10 minutes spent regarding below topics:  - Nutrition and exercise counseling.  - Medication counseling provided.     Med monitoring  Tolerating Prolia well, labs stable.    Plan:          Osteoporosis:  Discontinue Prolia  Trial of Fosamax 70 mg once weekly   Discussed with patient side effects and risks of medication.  She denies reflux currently.  Can trial Fosamax x1 month to see how she does.  Could consider Reclast if tolerates Fosamax well  Weight bearing activity as tolerated.  Caution to avoid falls    Repeat dexa 5/2024 to assess therapy with bisphosphonate    Continue vitamin D supplement 1,000-2,000 units daily.  Calcium in diet. No need for calcium supplement at this time given high-normal values    Will x-ray bilateral feet today   Referral to Podiatry     Completed physical therapy for low back.  If worsening pain, discuss with orthopedic or back and spine.    Follow up 6 months for prolia with CMP, DEXA, vitamin-D one week prior    Casi Prieto PA-C  [facility]  Rheumatology       30 minutes of total time spent on the encounter, which includes face to face time and non-face to face time preparing to see the patient (eg, review of tests), Obtaining and/or reviewing separately obtained history, Documenting clinical information in the electronic or other health record, Independently interpreting results (not separately reported) and communicating results to the patient/family/caregiver, or Care coordination (not separately reported).

## 2023-05-02 ENCOUNTER — OFFICE VISIT (OUTPATIENT)
Dept: RHEUMATOLOGY | Facility: CLINIC | Age: 88
End: 2023-05-02
Payer: MEDICARE

## 2023-05-02 ENCOUNTER — HOSPITAL ENCOUNTER (OUTPATIENT)
Dept: RADIOLOGY | Facility: HOSPITAL | Age: 88
Discharge: HOME | End: 2023-05-02
Payer: MEDICARE

## 2023-05-02 VITALS
DIASTOLIC BLOOD PRESSURE: 76 MMHG | SYSTOLIC BLOOD PRESSURE: 176 MMHG | HEART RATE: 71 BPM | BODY MASS INDEX: 27.21 KG/M2 | RESPIRATION RATE: 16 BRPM | WEIGHT: 129.63 LBS | HEIGHT: 58 IN

## 2023-05-02 DIAGNOSIS — M15.9 PRIMARY OSTEOARTHRITIS INVOLVING MULTIPLE JOINTS: ICD-10-CM

## 2023-05-02 DIAGNOSIS — M79.672 LEFT FOOT PAIN: ICD-10-CM

## 2023-05-02 DIAGNOSIS — M81.0 AGE-RELATED OSTEOPOROSIS WITHOUT CURRENT PATHOLOGICAL FRACTURE: Primary | ICD-10-CM

## 2023-05-02 DIAGNOSIS — Z71.89 COUNSELING ON HEALTH PROMOTION AND DISEASE PREVENTION: ICD-10-CM

## 2023-05-02 PROCEDURE — 99999 PR PBB SHADOW E&M-EST. PATIENT-LVL IV: CPT | Mod: PBBFAC,,,

## 2023-05-02 PROCEDURE — 73630 XR FOOT COMPLETE 3 VIEW BILATERAL: ICD-10-PCS | Mod: 26,50,, | Performed by: RADIOLOGY

## 2023-05-02 PROCEDURE — 3288F FALL RISK ASSESSMENT DOCD: CPT | Mod: CPTII,S$GLB,,

## 2023-05-02 PROCEDURE — 73630 X-RAY EXAM OF FOOT: CPT | Mod: TC,50

## 2023-05-02 PROCEDURE — 1101F PR PT FALLS ASSESS DOC 0-1 FALLS W/OUT INJ PAST YR: ICD-10-PCS | Mod: CPTII,S$GLB,,

## 2023-05-02 PROCEDURE — 73630 X-RAY EXAM OF FOOT: CPT | Mod: 26,50,, | Performed by: RADIOLOGY

## 2023-05-02 PROCEDURE — 3288F PR FALLS RISK ASSESSMENT DOCUMENTED: ICD-10-PCS | Mod: CPTII,S$GLB,,

## 2023-05-02 PROCEDURE — 1101F PT FALLS ASSESS-DOCD LE1/YR: CPT | Mod: CPTII,S$GLB,,

## 2023-05-02 PROCEDURE — 99214 OFFICE O/P EST MOD 30 MIN: CPT | Mod: S$GLB,,,

## 2023-05-02 PROCEDURE — 1126F PR PAIN SEVERITY QUANTIFIED, NO PAIN PRESENT: ICD-10-PCS | Mod: CPTII,S$GLB,,

## 2023-05-02 PROCEDURE — 99999 PR PBB SHADOW E&M-EST. PATIENT-LVL IV: ICD-10-PCS | Mod: PBBFAC,,,

## 2023-05-02 PROCEDURE — 1159F MED LIST DOCD IN RCRD: CPT | Mod: CPTII,S$GLB,,

## 2023-05-02 PROCEDURE — 1160F PR REVIEW ALL MEDS BY PRESCRIBER/CLIN PHARMACIST DOCUMENTED: ICD-10-PCS | Mod: CPTII,S$GLB,,

## 2023-05-02 PROCEDURE — 1159F PR MEDICATION LIST DOCUMENTED IN MEDICAL RECORD: ICD-10-PCS | Mod: CPTII,S$GLB,,

## 2023-05-02 PROCEDURE — 99214 PR OFFICE/OUTPT VISIT, EST, LEVL IV, 30-39 MIN: ICD-10-PCS | Mod: S$GLB,,,

## 2023-05-02 PROCEDURE — 1126F AMNT PAIN NOTED NONE PRSNT: CPT | Mod: CPTII,S$GLB,,

## 2023-05-02 PROCEDURE — 1160F RVW MEDS BY RX/DR IN RCRD: CPT | Mod: CPTII,S$GLB,,

## 2023-05-02 RX ORDER — ALENDRONATE SODIUM 70 MG/1
70 TABLET ORAL
Qty: 12 TABLET | Refills: 3 | Status: SHIPPED | OUTPATIENT
Start: 2023-05-02 | End: 2023-09-06

## 2023-05-02 NOTE — PATIENT INSTRUCTIONS
Fosamax (alendronate)   Take one tablet (70 mg) one day a week. (For   Take it first thing in the morning with a full glass of water.   Do not eat, drink, or take any other medication for 30 minutes after the fosamax.   Remain upright for one hour.      If you do well with fosamax for one month without side effects, or if you experience worsening heartburn with the fosamax, we can try for an infusion that would be once a year.

## 2023-05-04 ENCOUNTER — OFFICE VISIT (OUTPATIENT)
Dept: PODIATRY | Facility: CLINIC | Age: 88
End: 2023-05-04
Payer: MEDICARE

## 2023-05-04 DIAGNOSIS — M77.52 BONE SPUR OF LEFT FOOT: ICD-10-CM

## 2023-05-04 DIAGNOSIS — M81.0 AGE-RELATED OSTEOPOROSIS WITHOUT CURRENT PATHOLOGICAL FRACTURE: Chronic | ICD-10-CM

## 2023-05-04 DIAGNOSIS — M77.51 BONE SPUR OF RIGHT FOOT: Primary | ICD-10-CM

## 2023-05-04 PROCEDURE — 99999 PR PBB SHADOW E&M-EST. PATIENT-LVL II: CPT | Mod: PBBFAC,,, | Performed by: PODIATRIST

## 2023-05-04 PROCEDURE — 99203 PR OFFICE/OUTPT VISIT, NEW, LEVL III, 30-44 MIN: ICD-10-PCS | Mod: S$GLB,,, | Performed by: PODIATRIST

## 2023-05-04 PROCEDURE — 99203 OFFICE O/P NEW LOW 30 MIN: CPT | Mod: S$GLB,,, | Performed by: PODIATRIST

## 2023-05-04 PROCEDURE — 1159F PR MEDICATION LIST DOCUMENTED IN MEDICAL RECORD: ICD-10-PCS | Mod: CPTII,S$GLB,, | Performed by: PODIATRIST

## 2023-05-04 PROCEDURE — 1101F PT FALLS ASSESS-DOCD LE1/YR: CPT | Mod: CPTII,S$GLB,, | Performed by: PODIATRIST

## 2023-05-04 PROCEDURE — 1159F MED LIST DOCD IN RCRD: CPT | Mod: CPTII,S$GLB,, | Performed by: PODIATRIST

## 2023-05-04 PROCEDURE — 1126F PR PAIN SEVERITY QUANTIFIED, NO PAIN PRESENT: ICD-10-PCS | Mod: CPTII,S$GLB,, | Performed by: PODIATRIST

## 2023-05-04 PROCEDURE — 99999 PR PBB SHADOW E&M-EST. PATIENT-LVL II: ICD-10-PCS | Mod: PBBFAC,,, | Performed by: PODIATRIST

## 2023-05-04 PROCEDURE — 3288F FALL RISK ASSESSMENT DOCD: CPT | Mod: CPTII,S$GLB,, | Performed by: PODIATRIST

## 2023-05-04 PROCEDURE — 3288F PR FALLS RISK ASSESSMENT DOCUMENTED: ICD-10-PCS | Mod: CPTII,S$GLB,, | Performed by: PODIATRIST

## 2023-05-04 PROCEDURE — 1126F AMNT PAIN NOTED NONE PRSNT: CPT | Mod: CPTII,S$GLB,, | Performed by: PODIATRIST

## 2023-05-04 PROCEDURE — 1101F PR PT FALLS ASSESS DOC 0-1 FALLS W/OUT INJ PAST YR: ICD-10-PCS | Mod: CPTII,S$GLB,, | Performed by: PODIATRIST

## 2023-05-04 NOTE — PROGRESS NOTES
Subjective:       Patient ID: Isamar Roe is a 88 y.o. female.    Chief Complaint: Foot Problem (Patient is non diabetic and denies pain at present. She states she has bilateral knots to dorsal feet. They do not cause pain or discomfort. PCP wanted her to have them checked out. )      HPI:  Isamar Roe presents to the office afternoon at the referral of DEACON Brunner with concerns of knots to the dorsal aspect of the right and left foot.  She states these have been ongoing for some time.  Relates having some concern with these due to her utilizing Prolia.  Reports causing 0/10 pain presently does not affect her regular life style or ambulation\\. Patient's PMD is Alina Roe MD.     Review of patient's allergies indicates:   Allergen Reactions    Meloxicam Swelling    Ace inhibitors      Other reaction(s): cough    Amlodipine      Leg swelling    Chlorthalidone      Other reaction(s): ? dizzy  Other reaction(s): ? dizzy    Codeine Nausea And Vomiting    Demerol (pf)  [meperidine (pf)]      Other reaction(s): Itching    Fosamax  [alendronate]      Other reaction(s): aches    Fosamax plus d  [alendronate-vitamin d3]      Other reaction(s): aches    Hydrochlorothiazide (bulk)      Other reaction(s): bad taste    Hydrocodone-acetaminophen      Other reaction(s): Stomach upset  Other reaction(s): Nausea    Ibandronate      Other reaction(s): aches  Other reaction(s): aches    Ibandronate sodium      Other reaction(s): Unknown    Losartan Diarrhea    Meperidine Itching    Opioids - morphine analogues     Opium     Statins-hmg-coa reductase inhibitors      Few statins intol    Toprol xl  [metoprolol succinate]      Other reaction(s): diarrhea  Other reaction(s): diarrhea    Tramadol     Trazodone Other (See Comments)     Dry mouth       Past Medical History:   Diagnosis Date    Arthritis     hands    Benign hematuria     [place]    Calcific tendinitis of left shoulder 1/27/2016    Cataract      Diverticulosis     colonoscopy 5/22/2012    Duodenal diverticulum     EGD 5/22/2012    GERD (gastroesophageal reflux disease)     Hemorrhoids     colonoscopy 5/22/2012    History of skin cancer     per patient s/p excision, no chemo or radiation, sees outside derm, Dr. Tinsley.    Hyperlipidemia     Hypertension     Impingement syndrome of left shoulder 1/27/2016    Osteoporosis     declines tx;11/17/16 phone note    Pneumonia     as a child    Sciatica     [place]; dr castillo    Skin cancer     Dr. Noriega    Vitamin D deficiency disease        Family History   Problem Relation Age of Onset    Heart disease Mother     Hypertension Mother     Cataracts Mother     Heart disease Father     Hypertension Father     Cataracts Father     Heart disease Brother 45    Heart disease Sister         A-fib    Heart disease Sister         heart bypass x 5    Kidney disease Brother 70    Heart disease Brother     Cancer Maternal Aunt         breast    Diabetes Maternal Grandmother     Diabetes Maternal Aunt     Hypertension Other         multiple family members    Cancer Other         multiple with skin cancer    Stroke Neg Hx     Melanoma Neg Hx     Hyperlipidemia Neg Hx        Social History     Socioeconomic History    Marital status:      Spouse name: AMBREEN    Number of children: 4   Tobacco Use    Smoking status: Never    Smokeless tobacco: Never   Substance and Sexual Activity    Alcohol use: No    Drug use: No    Sexual activity: Yes     Partners: Male     Birth control/protection: See Surgical Hx   Other Topics Concern    Are you pregnant or think you may be? No    Breast-feeding No   Social History Narrative    Wears a seatbelt.       Past Surgical History:   Procedure Laterality Date    APPENDECTOMY      CARPAL TUNNEL RELEASE Bilateral     CATARACT EXTRACTION W/  INTRAOCULAR LENS IMPLANT Left 05/01/2019    CATARACT EXTRACTION W/  INTRAOCULAR LENS IMPLANT Right 05/15/2019    CHOLECYSTECTOMY      HYSTERECTOMY      joint  removal in right thumb      MINIMALLY INVASIVE TRANSFORAMINAL LUMBAR INTERBODY FUSION (TLIF) N/A 6/29/2021    Procedure: FUSION, SPINE, LUMBAR, TLIF, MINIMALLY INVASIVE;  Surgeon: Iraida Lozada MD;  Location: [facility] OR [address] [place] [address];  Service: Neurosurgery;  Laterality: N/A;  L4-5    SKIN CANCER EXCISION      SPINE SURGERY  9/16/14    L4/5 laminectomy;dr lozada    TOE SURGERY       FIFTH       Review of Systems       Objective:   There were no vitals taken for this visit.    X-Ray Foot Complete Bilateral  Narrative: EXAM: XR FOOT COMPLETE 3 VIEW BILATERAL    CLINICAL INDICATION:   Pain in right foot.  Pain in left foot.    FINDINGS:  No comparison studies are available.  AP, oblique and lateral views of the bilateral feet were submitted for interpretation.  Large plantar and Achilles calcaneal spurs noted bilaterally.  Mild degenerative changes of the bilateral interphalangeal joints as well as the bilateral first metatarsal phalangeal joints.  Degenerative spurring of the dorsal surface of the tarsal bones.    Alignment is satisfactory. No     fractures, dislocations, or erosive arthritic change.  Negative for radiopaque foreign bodies or air in the soft tissues.  Impression: 1.  Negative for acute process involving the right or left foot.  2.  Degenerative changes involving the hindfoot and forefoot as detailed above.    Finalized on: 5/2/2023 11:56 AM By:  Brian Palacios MD  [facility]# [id number]      2023-05-02 11:58:[id number]    [facility]       Physical Exam  LOWER EXTREMITY PHYSICAL EXAMINATION        ORTHOPEDIC :  Palpable bone spurring present to the dorsal aspect the 1st tarsometatarsal joint.  Spurring is present.  There is no open wounds or ulceration.  There is no pain with direct palpation.    Assessment:     1. Bone spur of right foot    2. Bone spur of left foot    3. Age-related osteoporosis without current pathological fracture        Plan:     Bone spur of right foot  -     Ambulatory referral/consult to Podiatry    Bone  spur of left foot    Age-related osteoporosis without current pathological fracture      Thorough discussion is had with the patient today, concerning the diagnosis, its etiology, and the treatment algorithm at present.    X-rays were reviewed by myself patient room.  Bone spurring present to the dorsal aspect the 1st tarsometatarsal joint likely associated with the proximal lateral aspect the 1st metatarsal bone and the distal lateral aspect of the medial cuneiform.    As this is not causing pain with palpation and no open wounds/sores present, I would recommend to continue to ambulate in regular shoe gear as tolerated.  We discussed that if she does have pain or discomfort associated with this area, we can perform removal of bone spur intraoperatively.  Patient like to continue to ambulate in regular shoes as tolerated.        Future Appointments   Date Time Provider Department Center   5/30/2023 11:00 AM Cata Samaniego NP [facility]   9/6/2023  1:00 PM Casi Prieto PA-C [facility]

## 2023-05-29 ENCOUNTER — TELEPHONE (OUTPATIENT)
Dept: ADMINISTRATIVE | Facility: CLINIC | Age: 88
End: 2023-05-29
Payer: MEDICARE

## 2023-05-30 ENCOUNTER — OFFICE VISIT (OUTPATIENT)
Dept: FAMILY MEDICINE | Facility: CLINIC | Age: 88
End: 2023-05-30
Payer: MEDICARE

## 2023-05-30 VITALS
RESPIRATION RATE: 18 BRPM | WEIGHT: 129 LBS | HEIGHT: 58 IN | SYSTOLIC BLOOD PRESSURE: 125 MMHG | BODY MASS INDEX: 27.08 KG/M2 | TEMPERATURE: 99 F | DIASTOLIC BLOOD PRESSURE: 66 MMHG | HEART RATE: 81 BPM

## 2023-05-30 DIAGNOSIS — I10 ESSENTIAL HYPERTENSION: Primary | ICD-10-CM

## 2023-05-30 DIAGNOSIS — E78.2 MIXED HYPERLIPIDEMIA: ICD-10-CM

## 2023-05-30 DIAGNOSIS — M81.0 AGE-RELATED OSTEOPOROSIS WITHOUT CURRENT PATHOLOGICAL FRACTURE: Chronic | ICD-10-CM

## 2023-05-30 DIAGNOSIS — Z00.00 ENCOUNTER FOR MEDICARE ANNUAL WELLNESS EXAM: ICD-10-CM

## 2023-05-30 DIAGNOSIS — K44.9 HIATAL HERNIA WITH GASTROESOPHAGEAL REFLUX: ICD-10-CM

## 2023-05-30 DIAGNOSIS — D31.31 CHOROIDAL NEVUS OF RIGHT EYE: ICD-10-CM

## 2023-05-30 DIAGNOSIS — I70.0 ARTERIOSCLEROSIS OF AORTA: ICD-10-CM

## 2023-05-30 DIAGNOSIS — H25.13 NUCLEAR SCLEROSIS OF BOTH EYES: ICD-10-CM

## 2023-05-30 DIAGNOSIS — Z96.1 PSEUDOPHAKIA: ICD-10-CM

## 2023-05-30 DIAGNOSIS — K21.9 HIATAL HERNIA WITH GASTROESOPHAGEAL REFLUX: ICD-10-CM

## 2023-05-30 DIAGNOSIS — I38 HEART VALVE REGURGITATION: ICD-10-CM

## 2023-05-30 PROCEDURE — 1101F PR PT FALLS ASSESS DOC 0-1 FALLS W/OUT INJ PAST YR: ICD-10-PCS | Mod: CPTII,S$GLB,, | Performed by: NURSE PRACTITIONER

## 2023-05-30 PROCEDURE — 99999 PR PBB SHADOW E&M-EST. PATIENT-LVL IV: CPT | Mod: PBBFAC,,, | Performed by: NURSE PRACTITIONER

## 2023-05-30 PROCEDURE — 1159F MED LIST DOCD IN RCRD: CPT | Mod: CPTII,S$GLB,, | Performed by: NURSE PRACTITIONER

## 2023-05-30 PROCEDURE — 1159F PR MEDICATION LIST DOCUMENTED IN MEDICAL RECORD: ICD-10-PCS | Mod: CPTII,S$GLB,, | Performed by: NURSE PRACTITIONER

## 2023-05-30 PROCEDURE — G0439 PPPS, SUBSEQ VISIT: HCPCS | Mod: S$GLB,,, | Performed by: NURSE PRACTITIONER

## 2023-05-30 PROCEDURE — 99999 PR PBB SHADOW E&M-EST. PATIENT-LVL IV: ICD-10-PCS | Mod: PBBFAC,,, | Performed by: NURSE PRACTITIONER

## 2023-05-30 PROCEDURE — 1126F AMNT PAIN NOTED NONE PRSNT: CPT | Mod: CPTII,S$GLB,, | Performed by: NURSE PRACTITIONER

## 2023-05-30 PROCEDURE — 1101F PT FALLS ASSESS-DOCD LE1/YR: CPT | Mod: CPTII,S$GLB,, | Performed by: NURSE PRACTITIONER

## 2023-05-30 PROCEDURE — 3288F PR FALLS RISK ASSESSMENT DOCUMENTED: ICD-10-PCS | Mod: CPTII,S$GLB,, | Performed by: NURSE PRACTITIONER

## 2023-05-30 PROCEDURE — 1160F RVW MEDS BY RX/DR IN RCRD: CPT | Mod: CPTII,S$GLB,, | Performed by: NURSE PRACTITIONER

## 2023-05-30 PROCEDURE — 1160F PR REVIEW ALL MEDS BY PRESCRIBER/CLIN PHARMACIST DOCUMENTED: ICD-10-PCS | Mod: CPTII,S$GLB,, | Performed by: NURSE PRACTITIONER

## 2023-05-30 PROCEDURE — 3288F FALL RISK ASSESSMENT DOCD: CPT | Mod: CPTII,S$GLB,, | Performed by: NURSE PRACTITIONER

## 2023-05-30 PROCEDURE — G0439 PR MEDICARE ANNUAL WELLNESS SUBSEQUENT VISIT: ICD-10-PCS | Mod: S$GLB,,, | Performed by: NURSE PRACTITIONER

## 2023-05-30 PROCEDURE — 1126F PR PAIN SEVERITY QUANTIFIED, NO PAIN PRESENT: ICD-10-PCS | Mod: CPTII,S$GLB,, | Performed by: NURSE PRACTITIONER

## 2023-05-30 NOTE — PROGRESS NOTES
"Isamar Roe presented for a  Medicare AWV and comprehensive Health Risk Assessment today. The following components were reviewed and updated:    Medical history  Family History  Social history  Allergies and Current Medications  Health Risk Assessment  Health Maintenance  Care Team         ** See Completed Assessments for Annual Wellness Visit within the encounter summary.**         The following assessments were completed:  Living Situation  CAGE  Depression Screening  Timed Get Up and Go  Whisper Test  Cognitive Function Screening  Nutrition Screening  ADL Screening  PAQ Screening        Vitals:    05/30/23 1046   BP: 125/66   BP Location: Right arm   Patient Position: Sitting   BP Method: Medium (Automatic)   Pulse: 81   Resp: 18   Temp: 98.5 °F (36.9 °C)   Weight: 58.5 kg (129 lb)   Height: 4' 10" (1.473 m)     Body mass index is 26.96 kg/m².  Physical Exam  Vitals and nursing note reviewed.   Constitutional:       General: She is not in acute distress.     Appearance: She is well-developed.   HENT:      Head: Normocephalic and atraumatic.   Eyes:      Pupils: Pupils are equal, round, and reactive to light.   Cardiovascular:      Rate and Rhythm: Normal rate and regular rhythm.   Pulmonary:      Effort: Pulmonary effort is normal.      Breath sounds: Normal breath sounds.   Musculoskeletal:         General: Normal range of motion.      Cervical back: Normal range of motion and neck supple.   Skin:     General: Skin is warm and dry.      Findings: No rash.   Neurological:      Mental Status: She is alert and oriented to person, place, and time.   Psychiatric:         Judgment: Judgment normal.           Diagnoses and health risks identified today and associated recommendations/orders:    1. Encounter for Medicare annual wellness exam  Stable and controlled   Continue medication as prescribed  Continue current treatment plan as previously prescribed with your PCP  - Ambulatory Referral/Consult to Enhanced Annual " Wellness Visit (eAWV)    2. Essential hypertension  Stable and controlled on verapamil, HCTZ  Continue medication as prescribed  Continue current treatment plan as previously prescribed with your PCP    3. Mixed hyperlipidemia  Stable and controlled on diet  Continue medication as prescribed  Continue current treatment plan as previously prescribed with your PCP    4. Arteriosclerosis of aorta  Stable and controlled   Continue medication as prescribed  Continue current treatment plan as previously prescribed with your PCP    5. Heart valve regurgitation  Stable and controlled   Continue medication as prescribed  Continue current treatment plan as previously prescribed with your PCP    6. Hiatal hernia with gastroesophageal reflux  Stable and controlled on diet  Continue medication as prescribed  Continue current treatment plan as previously prescribed with your PCP    7. Age-related osteoporosis without current pathological fracture  Stable and controlled on fosamax  Continue medication as prescribed  Continue current treatment plan as previously prescribed with your PCP    8. Nuclear sclerosis of both eyes  Stable and controlled  Continue medication as prescribed  Continue current treatment plan as previously prescribed with your PCP  Routine follow up with ophthalmology    9. Choroidal nevus of right eye  Stable and controlled   Continue medication as prescribed  Continue current treatment plan as previously prescribed with your PCP  Routine follow up with ophthalmology    10. Pseudophakia  Stable and controlled   Continue medication as prescribed  Continue current treatment plan as previously prescribed with your PCP  Routine follow up with ophthalmology        Review for Opioid Screening: Pt does not have Rx for Opioids (If patient has Rx list here)    Review for Substance Use Disorders: Patient does not use substance (If patient has Rx list here)          I offered to discuss end of life issues, including  information on how to make advance directives that the patient could use to name someone who would make medical decisions on their behalf if they became too ill to make themselves.    _x__Patient declined     ___Patient is interested, I provided paperwork and offered to discuss.      Provided Isamar with a 5-10 year written screening schedule and personal prevention plan. Recommendations were developed using the USPSTF age appropriate recommendations. Education, counseling, and referrals were provided as needed. After Visit Summary printed and given to patient which includes a list of additional screenings\tests needed.    No follow-ups on file.    Cata Samaniego NP

## 2023-06-06 ENCOUNTER — TELEPHONE (OUTPATIENT)
Dept: RHEUMATOLOGY | Facility: CLINIC | Age: 88
End: 2023-06-06
Payer: MEDICARE

## 2023-06-06 NOTE — TELEPHONE ENCOUNTER
----- Message from Aimee Sin MA sent at 5/2/2023  9:01 AM CDT -----  Regarding: Fosamax check up  Reminder for one month to see how she is doing with fosamax. If tolerating okay can try for reclast (she may want the once monthly infusion rather than pill weekly)

## 2023-06-06 NOTE — TELEPHONE ENCOUNTER
"I spoke w/ the patient to see if she is tolerating the Fosamax well.     Pt responded with a "Yes" and stated she is taking it 1 tab every Monday.    Asked patient have she considered the monthly infusion?  Pt stated she would rather continue to take the pill weekly.    I informed pt I will let Casi know the update and keep f/u as scheduled in September.    No further questions or concerns.       "

## 2023-08-08 NOTE — TELEPHONE ENCOUNTER
No care due was identified.  [facility] Embedded Care Due Messages. Reference number: [id number].   8/08/2023 8:51:57 AM CDT

## 2023-08-09 RX ORDER — HYDROCHLOROTHIAZIDE 12.5 MG/1
CAPSULE ORAL
Qty: 90 CAPSULE | Refills: 3 | Status: SHIPPED | OUTPATIENT
Start: 2023-08-09

## 2023-08-09 NOTE — TELEPHONE ENCOUNTER
Refill Routing Note   Medication(s) are not appropriate for processing by [facility] for the following reason(s):      Allergy or intolerance    [facility] action(s):  Defer Care Due:  None identified     Medication Therapy Plan: Allergy/Contraindication: hydroCHLOROthiazide      Appointments  past 12m or future 3m with PCP    Date Provider   Last Visit   3/15/2023 Alina Roe MD   Next Visit   Visit date not found Alina Roe MD   ED visits in past 90 days: 0        Note composed:8:23 PM 08/08/2023

## 2023-08-16 ENCOUNTER — OFFICE VISIT (OUTPATIENT)
Dept: DERMATOLOGY | Facility: CLINIC | Age: 88
End: 2023-08-16
Payer: MEDICARE

## 2023-08-16 ENCOUNTER — TELEPHONE (OUTPATIENT)
Dept: DERMATOLOGY | Facility: CLINIC | Age: 88
End: 2023-08-16
Payer: MEDICARE

## 2023-08-16 DIAGNOSIS — L30.4 INTERTRIGO: Primary | ICD-10-CM

## 2023-08-16 PROCEDURE — 99999 PR PBB SHADOW E&M-EST. PATIENT-LVL II: CPT | Mod: PBBFAC,HCNC,, | Performed by: DERMATOLOGY

## 2023-08-16 PROCEDURE — 1159F MED LIST DOCD IN RCRD: CPT | Mod: HCNC,CPTII,S$GLB, | Performed by: DERMATOLOGY

## 2023-08-16 PROCEDURE — 1160F PR REVIEW ALL MEDS BY PRESCRIBER/CLIN PHARMACIST DOCUMENTED: ICD-10-PCS | Mod: HCNC,CPTII,S$GLB, | Performed by: DERMATOLOGY

## 2023-08-16 PROCEDURE — 99999 PR PBB SHADOW E&M-EST. PATIENT-LVL II: ICD-10-PCS | Mod: PBBFAC,HCNC,, | Performed by: DERMATOLOGY

## 2023-08-16 PROCEDURE — 1160F RVW MEDS BY RX/DR IN RCRD: CPT | Mod: HCNC,CPTII,S$GLB, | Performed by: DERMATOLOGY

## 2023-08-16 PROCEDURE — 99203 PR OFFICE/OUTPT VISIT, NEW, LEVL III, 30-44 MIN: ICD-10-PCS | Mod: HCNC,S$GLB,, | Performed by: DERMATOLOGY

## 2023-08-16 PROCEDURE — 1159F PR MEDICATION LIST DOCUMENTED IN MEDICAL RECORD: ICD-10-PCS | Mod: HCNC,CPTII,S$GLB, | Performed by: DERMATOLOGY

## 2023-08-16 PROCEDURE — 99203 OFFICE O/P NEW LOW 30 MIN: CPT | Mod: HCNC,S$GLB,, | Performed by: DERMATOLOGY

## 2023-08-16 RX ORDER — CICLOPIROX OLAMINE 7.7 MG/G
CREAM TOPICAL
Qty: 30 G | Refills: 3 | Status: SHIPPED | OUTPATIENT
Start: 2023-08-16

## 2023-08-16 RX ORDER — TRIAMCINOLONE ACETONIDE 1 MG/G
CREAM TOPICAL
Qty: 30 G | Refills: 3 | Status: SHIPPED | OUTPATIENT
Start: 2023-08-16

## 2023-08-16 NOTE — PROGRESS NOTES
Subjective:      Patient ID:  Isamar Roe is a 88 y.o. female who presents for   Chief Complaint   Patient presents with    Fungus     Pt has skin rash under both breast and it is worst under the left breast which looks like possible yeast     History of Present Illness: The patient presents with chief complaint of rash. She states rash appeared after having a test done on the back in walker.   Location: inframammary  Duration: 6 months  Signs/Symptoms: pruritus    Prior treatments: ketoconazole, TAC 0.1% cream          Review of Systems   Constitutional:  Negative for fever and chills.   Gastrointestinal:  Negative for nausea and vomiting.   Skin:  Positive for itching, rash and activity-related sunscreen use. Negative for daily sunscreen use and recent sunburn.   Hematologic/Lymphatic: Does not bruise/bleed easily.       Objective:   Physical Exam   Constitutional: She appears well-developed and well-nourished. No distress.   Neurological: She is alert and oriented to person, place, and time. She is not disoriented.   Psychiatric: She has a normal mood and affect.   Skin:   Areas Examined (abnormalities noted in diagram):   Head / Face Inspection Performed  Neck Inspection Performed  Chest / Axilla Inspection Performed  Abdomen Inspection Performed  Back Inspection Performed  RUE Inspected  LUE Inspection Performed             Assessment / Plan:        Intertrigo  -     ciclopirox (LOPROX) 0.77 % Crea; AAA bid after cool blow dry. Patient instructions: mix 30 grams of triamcinolone 0.1% cream with 30 grams of ciclopirox cream in 120 cc of Milk of Magnesia  Dispense: 30 g; Refill: 3  -     triamcinolone acetonide 0.1% (KENALOG) 0.1 % cream; AAA bid after cool blow dry. Patient instructions: mix 30 grams of triamcinolone 0.1% cream with 30 grams of ciclopirox cream in 120 cc of Milk of Magnesia  Dispense: 30 g; Refill: 3  -     failed TAC 0.1% cream and ketoconazole cream. Will start shake solution and  recommend air drying location when possible. The patient acknowledged understanding. Consider diflucan PO vs. Miconazole powder if fails to improve.            Follow up in about 3 months (around 11/16/2023).

## 2023-08-16 NOTE — PATIENT INSTRUCTIONS
Instructions for intertrigo:    Use milk of magnesia shake solution 2-3 times/daily    Frequently air out folds while at home (use a hand-held blow dryer set to cool)

## 2023-08-16 NOTE — TELEPHONE ENCOUNTER
Returned call to patient. No answer. LVM.    ----- Message from Regina Hays sent at 8/16/2023  9:22 AM CDT -----  Contact: Isamar  Patient has called to schedule a visit for dermatology. Patient has been scheduled for visit today at 2:30 pm due to a rash and appointment instructions requested a message is sent to inform staff. If necessary, please give patient a call back at [phone] to assist.   Thank you,  GH

## 2023-09-06 ENCOUNTER — OFFICE VISIT (OUTPATIENT)
Dept: RHEUMATOLOGY | Facility: CLINIC | Age: 88
End: 2023-09-06
Payer: MEDICARE

## 2023-09-06 ENCOUNTER — TELEPHONE (OUTPATIENT)
Dept: INFUSION THERAPY | Facility: HOSPITAL | Age: 88
End: 2023-09-06
Payer: MEDICARE

## 2023-09-06 ENCOUNTER — LAB VISIT (OUTPATIENT)
Dept: LAB | Facility: HOSPITAL | Age: 88
End: 2023-09-06
Payer: MEDICARE

## 2023-09-06 VITALS
HEART RATE: 71 BPM | SYSTOLIC BLOOD PRESSURE: 136 MMHG | HEIGHT: 58 IN | BODY MASS INDEX: 27.54 KG/M2 | DIASTOLIC BLOOD PRESSURE: 74 MMHG | WEIGHT: 131.19 LBS

## 2023-09-06 DIAGNOSIS — M79.672 LEFT FOOT PAIN: ICD-10-CM

## 2023-09-06 DIAGNOSIS — M81.0 AGE-RELATED OSTEOPOROSIS WITHOUT CURRENT PATHOLOGICAL FRACTURE: ICD-10-CM

## 2023-09-06 DIAGNOSIS — Z71.89 COUNSELING ON HEALTH PROMOTION AND DISEASE PREVENTION: ICD-10-CM

## 2023-09-06 DIAGNOSIS — M81.0 AGE-RELATED OSTEOPOROSIS WITHOUT CURRENT PATHOLOGICAL FRACTURE: Primary | ICD-10-CM

## 2023-09-06 DIAGNOSIS — M15.9 PRIMARY OSTEOARTHRITIS INVOLVING MULTIPLE JOINTS: ICD-10-CM

## 2023-09-06 LAB
ALBUMIN SERPL BCP-MCNC: 4.1 G/DL (ref 3.5–5.2)
ALP SERPL-CCNC: 101 U/L (ref 55–135)
ALT SERPL W/O P-5'-P-CCNC: 15 U/L (ref 10–44)
ANION GAP SERPL CALC-SCNC: 9 MMOL/L (ref 8–16)
AST SERPL-CCNC: 17 U/L (ref 10–40)
BILIRUB SERPL-MCNC: 0.5 MG/DL (ref 0.1–1)
BUN SERPL-MCNC: 20 MG/DL (ref 8–23)
CALCIUM SERPL-MCNC: 10.3 MG/DL (ref 8.7–10.5)
CHLORIDE SERPL-SCNC: 103 MMOL/L (ref 95–110)
CO2 SERPL-SCNC: 29 MMOL/L (ref 23–29)
CREAT SERPL-MCNC: 0.8 MG/DL (ref 0.5–1.4)
EST. GFR  (NO RACE VARIABLE): >60 ML/MIN/1.73 M^2
GLUCOSE SERPL-MCNC: 78 MG/DL (ref 70–110)
POTASSIUM SERPL-SCNC: 4.4 MMOL/L (ref 3.5–5.1)
PROT SERPL-MCNC: 7.4 G/DL (ref 6–8.4)
SODIUM SERPL-SCNC: 141 MMOL/L (ref 136–145)

## 2023-09-06 PROCEDURE — 1159F MED LIST DOCD IN RCRD: CPT | Mod: HCNC,CPTII,S$GLB,

## 2023-09-06 PROCEDURE — 1159F PR MEDICATION LIST DOCUMENTED IN MEDICAL RECORD: ICD-10-PCS | Mod: HCNC,CPTII,S$GLB,

## 2023-09-06 PROCEDURE — 3288F FALL RISK ASSESSMENT DOCD: CPT | Mod: HCNC,CPTII,S$GLB,

## 2023-09-06 PROCEDURE — 1101F PT FALLS ASSESS-DOCD LE1/YR: CPT | Mod: HCNC,CPTII,S$GLB,

## 2023-09-06 PROCEDURE — 1101F PR PT FALLS ASSESS DOC 0-1 FALLS W/OUT INJ PAST YR: ICD-10-PCS | Mod: HCNC,CPTII,S$GLB,

## 2023-09-06 PROCEDURE — 99999 PR PBB SHADOW E&M-EST. PATIENT-LVL III: ICD-10-PCS | Mod: PBBFAC,HCNC,,

## 2023-09-06 PROCEDURE — 99215 PR OFFICE/OUTPT VISIT, EST, LEVL V, 40-54 MIN: ICD-10-PCS | Mod: HCNC,S$GLB,,

## 2023-09-06 PROCEDURE — 1126F AMNT PAIN NOTED NONE PRSNT: CPT | Mod: HCNC,CPTII,S$GLB,

## 2023-09-06 PROCEDURE — 1160F RVW MEDS BY RX/DR IN RCRD: CPT | Mod: HCNC,CPTII,S$GLB,

## 2023-09-06 PROCEDURE — 80053 COMPREHEN METABOLIC PANEL: CPT | Mod: HCNC

## 2023-09-06 PROCEDURE — 1126F PR PAIN SEVERITY QUANTIFIED, NO PAIN PRESENT: ICD-10-PCS | Mod: HCNC,CPTII,S$GLB,

## 2023-09-06 PROCEDURE — 99999 PR PBB SHADOW E&M-EST. PATIENT-LVL III: CPT | Mod: PBBFAC,HCNC,,

## 2023-09-06 PROCEDURE — 3288F PR FALLS RISK ASSESSMENT DOCUMENTED: ICD-10-PCS | Mod: HCNC,CPTII,S$GLB,

## 2023-09-06 PROCEDURE — 99215 OFFICE O/P EST HI 40 MIN: CPT | Mod: HCNC,S$GLB,,

## 2023-09-06 PROCEDURE — 1160F PR REVIEW ALL MEDS BY PRESCRIBER/CLIN PHARMACIST DOCUMENTED: ICD-10-PCS | Mod: HCNC,CPTII,S$GLB,

## 2023-09-06 PROCEDURE — 36415 COLL VENOUS BLD VENIPUNCTURE: CPT | Mod: HCNC

## 2023-09-06 RX ORDER — HEPARIN 100 UNIT/ML
500 SYRINGE INTRAVENOUS
Status: CANCELLED | OUTPATIENT
Start: 2023-09-06

## 2023-09-06 RX ORDER — SODIUM CHLORIDE 0.9 % (FLUSH) 0.9 %
10 SYRINGE (ML) INJECTION
Status: CANCELLED | OUTPATIENT
Start: 2023-09-06

## 2023-09-06 RX ORDER — ACETAMINOPHEN 325 MG/1
650 TABLET ORAL
Status: CANCELLED | OUTPATIENT
Start: 2023-09-06

## 2023-09-06 RX ORDER — ZOLEDRONIC ACID 5 MG/100ML
5 INJECTION, SOLUTION INTRAVENOUS
Status: CANCELLED | OUTPATIENT
Start: 2023-09-06

## 2023-09-06 NOTE — PATIENT INSTRUCTIONS
Our plan:  Continue Alendronate once weekly until we start the infusion (Reclast) in 1-2 weeks.   Once you start the Reclast infusions, discontinue Alendronate.      Reclast infusion:  Take tylenol 325 mg morning of infusion or just prior to infusion.   If you experience flu like symptoms (fever, chills, muscle aches) following the infusion, take 650 mg tylenol every 8 hours as needed. Let me know if symptoms do not improve after a few days.     Important things for keeping bones strong and prevent weakening:  Taking medication appropriately. This is the infusion we are planning to start.   Continue Calcium and Vitamin D   Weight bearing activities as tolerated. But be careful to avoid falls. Putting weight through your bones helps remind your body to keep them strong.

## 2023-09-06 NOTE — Clinical Note
Plan to start Reclast infusion in the next 1-2 weeks.  She is getting CMP today.  Juliann-patient sometimes gets confused about medications.  She knows that in the past she had a bad reaction with Fosamax.  However, in May we did a retrial with the medication and she did very well without any side effects.  Planning to move to Reclast due to difficulties remembering to take weekly medication.  I discussed with her that since she did well with the recent trial of alendronate, she should do well with the Reclast.  However, I did discuss the risk of acute phase reaction and advised her to take Tylenol should these symptoms occur.  She is very sweet but sometimes have difficulty remembering things.  Just heads-up.  Thank you!

## 2023-09-06 NOTE — PROGRESS NOTES
"         RHEUMATOLOGY OUTPATIENT CLINIC NOTE    09/06/2023    Subjective:       Patient ID: Isamar Roe is a 88 y.o. female.    Chief Complaint: Osteoporosis      HPI    Isamar Roe is a 88 y.o. pleasant female here for rheumatology follow up for osteoporosis.     She states she is not sure if she has been taking a once a week pill therefore unsure if she has been taking alendronate.  When we called her in June she said she had been taking it and tolerating it well.    Denies any falls or fractures since last appointment.    Still with occ lumbar back pain post back surgery. Denies radiation and no numbness/tingling LE.     Takes vitamin-D daily unsure of dosage.  Denies any falls or fractures since last appointment.  Vitamin-D daily. Prior dental implants but no upcoming planned invasive dental work.     Rheumatologic review of systems negative otherwise.    Physical exam  Able to rise from a chair independently.  Walks without assistance.  Steady gait.  Mild kyphosis.       Objective:   /74 (BP Location: Left arm, Patient Position: Sitting, BP Method: Medium (Automatic))   Pulse 71   Ht 4' 10" (1.473 m)   Wt 59.5 kg (131 lb 2.8 oz)   BMI 27.42 kg/m²        No results found for this or any previous visit (from the past 672 hour(s)).         Dexa 3.8.2021:  L1-L4  -1.8, FN -2.5, TH -1.9, -11.7% sig decline at hip. Osteoporosis    DEXA 04/25/2023:   Spine -2.0, FN-2.2, TH -1.9, stable osteopenia, major 15.2, hip fracture 5.3    Assessment:       1. Age-related osteoporosis without current pathological fracture    2. Counseling on health promotion and disease prevention    3. Left foot pain    4. Primary osteoarthritis involving multiple joints            Impression:   Osteoporosis   Prior therapies:   Alendronate many years ago.  Reported MSK side effects   Prolia 04/21/2022-11/02/2022.  Patient felt hard nodules on her foot related to Prolia.  She has bone spurs on her foot, unrelated to Prolia " therapy, but she ultimately chose to discontinue Prolia regardless.  Trial of alendronate weekly 05/2023.  We called her 1 month later and she said she had been tolerating it without any side effects.  There is some confusion on whether or not she has been taking alendronate since 05/20/2023 present.  When we called her in June she was tolerating it well.  Due to difficulties with remembering medications, would recommend transitioning to a IV bisphosphonate Reclast.  She also has GERD, so this would be preferred over oral bisphosphonate.    Foot pain   Bone spurring seen on xray. Has seen podiatry, watchful waiting for now.    Osteoarthritis   Doing well from a joint standpoint aside from continued low back pain status post low back surgery.    Other specified counseling  Over 10 minutes spent regarding below topics:  - Nutrition and exercise counseling.  - Medication counseling provided.     Med monitoring  Tolerating Prolia well, labs stable.    Plan:          Osteoporosis  Medication  Stop Fosamax   Plan for Reclast in next 1-2 weeks  Patient has reported allergy of bisphosphonate.  We did a trial of alendronate starting in May 2023.  We called her 1 month later, she confirmed she was taking it weekly and denied any side effects.  Due to difficulty remembering to take weekly medication, planning to switch to IV bisphosphonate.  Dexa  Repeat 1 year after Reclast (09/2024)  Counseling   Weight bearing activity as tolerated  Caution to avoid falls    We discussed at length different treatment options for osteoporosis.  Discussed similarities and differences of alendronate and Reclast.  Ultimately, patient would like to do trial of Reclast.  Discussed risks of APR, advised patient to take Tylenol p.r.n. should these symptoms occur and to let me know if they last longer than 3 days.  Continue vitamin D supplement 1,000-2,000 units daily.  Calcium in diet. No need for calcium supplement at this time given high-normal  values    Completed physical therapy for low back.  If worsening pain, discuss with orthopedic or back and spine.    CMP today  Return to clinic 1 year after Reclast infusion with early CMP, LEXI Prieto PA-C  [facility]       40 minutes of total time spent on the encounter, which includes face to face time and non-face to face time preparing to see the patient (eg, review of tests), Obtaining and/or reviewing separately obtained history, Documenting clinical information in the electronic or other health record, Independently interpreting results (not separately reported) and communicating results to the patient/family/caregiver, or Care coordination (not separately reported).    Disclaimer: This note was prepared using voice recognition system and is likely to have sound alike errors and is not proof read.  Please call me with any questions

## 2023-09-07 ENCOUNTER — TELEPHONE (OUTPATIENT)
Dept: RHEUMATOLOGY | Facility: CLINIC | Age: 88
End: 2023-09-07
Payer: MEDICARE

## 2023-09-07 NOTE — TELEPHONE ENCOUNTER
----- Message from Karissa Woody LPN sent at 9/6/2023  3:40 PM CDT -----  I did try to contact the patient to schedule, but wasn't able to reach her.  I left a VM asking her to call back.   +    ----- Message -----  From: Casi Prieto PA-C  Sent: 9/6/2023   1:52 PM CDT  To: Juliann Pandey RN; #    Plan to start Reclast infusion in the next 1-2 weeks.  She is getting CMP today.    Juliann-patient sometimes gets confused about medications.  She knows that in the past she had a bad reaction with Fosamax.  However, in May we did a retrial with the medication and she did very well without any side effects.  Planning to move to Reclast due to difficulties remembering to take weekly medication.  I discussed with her that since she did well with the recent trial of alendronate, she should do well with the Reclast.  However, I did discuss the risk of acute phase reaction and advised her to take Tylenol should these symptoms occur.  She is very sweet but sometimes have difficulty remembering things.  Just heads-up.  Thank you!

## 2023-09-08 ENCOUNTER — TELEPHONE (OUTPATIENT)
Dept: RHEUMATOLOGY | Facility: CLINIC | Age: 88
End: 2023-09-08
Payer: MEDICARE

## 2023-09-08 NOTE — TELEPHONE ENCOUNTER
----- Message from Casi Prieto PA-C sent at 9/6/2023  1:52 PM CDT -----  Plan to start Reclast infusion in the next 1-2 weeks.  She is getting CMP today.    Juliann-patient sometimes gets confused about medications.  She knows that in the past she had a bad reaction with Fosamax.  However, in May we did a retrial with the medication and she did very well without any side effects.  Planning to move to Reclast due to difficulties remembering to take weekly medication.  I discussed with her that since she did well with the recent trial of alendronate, she should do well with the Reclast.  However, I did discuss the risk of acute phase reaction and advised her to take Tylenol should these symptoms occur.  She is very sweet but sometimes have difficulty remembering things.  Just heads-up.  Thank you!

## 2023-09-08 NOTE — TELEPHONE ENCOUNTER
Discussed reclast infusion protocol. Scheduled for 9/15/23 at 9:00 at the . Directions given. Verbalized understanding and wrote information on a note.

## 2023-09-08 NOTE — TELEPHONE ENCOUNTER
I attempted to contact pt primary contact on file and alternative contact. Both calls were unsuccessful, lvm w/ call back number

## 2023-09-08 NOTE — TELEPHONE ENCOUNTER
----- Message from Juliann Pandey RN sent at 9/7/2023  3:26 PM CDT -----  I have tried 3 times  ----- Message -----  From: Casi Prieto PA-C  Sent: 9/7/2023   3:16 PM CDT  To: Karissa Woody LPN; #    Jonathan Yu, can you also try contacting this patient?  Thank you!  ----- Message -----  From: Karissa Woody LPN  Sent: 9/6/2023   3:41 PM CDT  To: Juliann Pandey RN; #    I did try to contact the patient to schedule, but wasn't able to reach her.  I left a VM asking her to call back.   +    ----- Message -----  From: Casi Prieto PA-C  Sent: 9/6/2023   1:52 PM CDT  To: Juliann Pandey RN; #    Plan to start Reclast infusion in the next 1-2 weeks.  She is getting CMP today.    Juliann-patient sometimes gets confused about medications.  She knows that in the past she had a bad reaction with Fosamax.  However, in May we did a retrial with the medication and she did very well without any side effects.  Planning to move to Reclast due to difficulties remembering to take weekly medication.  I discussed with her that since she did well with the recent trial of alendronate, she should do well with the Reclast.  However, I did discuss the risk of acute phase reaction and advised her to take Tylenol should these symptoms occur.  She is very sweet but sometimes have difficulty remembering things.  Just heads-up.  Thank you!

## 2023-09-15 ENCOUNTER — INFUSION (OUTPATIENT)
Dept: INFUSION THERAPY | Facility: HOSPITAL | Age: 88
End: 2023-09-15
Payer: MEDICARE

## 2023-09-15 VITALS
SYSTOLIC BLOOD PRESSURE: 149 MMHG | TEMPERATURE: 98 F | HEART RATE: 88 BPM | OXYGEN SATURATION: 95 % | RESPIRATION RATE: 18 BRPM | DIASTOLIC BLOOD PRESSURE: 70 MMHG

## 2023-09-15 DIAGNOSIS — M81.0 AGE-RELATED OSTEOPOROSIS WITHOUT CURRENT PATHOLOGICAL FRACTURE: Primary | ICD-10-CM

## 2023-09-15 PROCEDURE — 96374 THER/PROPH/DIAG INJ IV PUSH: CPT | Mod: HCNC

## 2023-09-15 PROCEDURE — 25000003 PHARM REV CODE 250: Mod: HCNC

## 2023-09-15 PROCEDURE — 63600175 PHARM REV CODE 636 W HCPCS: Mod: HCNC

## 2023-09-15 RX ORDER — SODIUM CHLORIDE 0.9 % (FLUSH) 0.9 %
10 SYRINGE (ML) INJECTION
OUTPATIENT
Start: 2023-09-15

## 2023-09-15 RX ORDER — ACETAMINOPHEN 325 MG/1
650 TABLET ORAL
Status: COMPLETED | OUTPATIENT
Start: 2023-09-15 | End: 2023-09-15

## 2023-09-15 RX ORDER — ACETAMINOPHEN 325 MG/1
650 TABLET ORAL
OUTPATIENT
Start: 2023-09-15

## 2023-09-15 RX ORDER — ZOLEDRONIC ACID 5 MG/100ML
5 INJECTION, SOLUTION INTRAVENOUS
OUTPATIENT
Start: 2023-09-15

## 2023-09-15 RX ORDER — ZOLEDRONIC ACID 5 MG/100ML
5 INJECTION, SOLUTION INTRAVENOUS
Status: COMPLETED | OUTPATIENT
Start: 2023-09-15 | End: 2023-09-15

## 2023-09-15 RX ORDER — SODIUM CHLORIDE 0.9 % (FLUSH) 0.9 %
10 SYRINGE (ML) INJECTION
Status: DISCONTINUED | OUTPATIENT
Start: 2023-09-15 | End: 2023-09-15 | Stop reason: HOSPADM

## 2023-09-15 RX ORDER — HEPARIN 100 UNIT/ML
500 SYRINGE INTRAVENOUS
OUTPATIENT
Start: 2023-09-15

## 2023-09-15 RX ADMIN — ZOLEDRONIC ACID 5 MG: 0.05 INJECTION, SOLUTION INTRAVENOUS at 09:09

## 2023-09-15 RX ADMIN — ACETAMINOPHEN 650 MG: 325 TABLET ORAL at 09:09

## 2023-09-15 NOTE — DISCHARGE INSTRUCTIONS
[facility]  [id number] [address]  [address]  [phone] phone     [phone] fax  Hours of Operation: Monday- Friday 8:00am- 5:00pm  After hours phone  [phone]  Hematology / Oncology Physicians on call      LESLIE Donohue Dr., Dr., NP Sydney Prescott, BANDAR Bush FNP    Please call with any concerns regarding your appointment today.

## 2023-09-15 NOTE — PLAN OF CARE
Problem: Adult Inpatient Plan of Care  Goal: Plan of Care Review  Outcome: Ongoing, Progressing  Flowsheets (Taken 9/15/2023 1028)  Plan of Care Reviewed With: patient  Goal: Patient-Specific Goal (Individualized)  Outcome: Ongoing, Progressing  Flowsheets (Taken 9/15/2023 1028)  Anxieties, Fears or Concerns: patient voices no concerns at this time  Individualized Care Needs: Feet elevated in recliner for comfort measures  Goal: Optimal Comfort and Wellbeing  Outcome: Ongoing, Progressing  Intervention: Provide Person-Centered Care  Flowsheets (Taken 9/15/2023 1028)  Trust Relationship/Rapport:   care explained   questions encouraged   choices provided   reassurance provided   emotional support provided   empathic listening provided   questions answered   thoughts/feelings acknowledged

## 2023-10-10 ENCOUNTER — TELEPHONE (OUTPATIENT)
Dept: DERMATOLOGY | Facility: CLINIC | Age: 88
End: 2023-10-10
Payer: MEDICARE

## 2023-10-18 ENCOUNTER — OFFICE VISIT (OUTPATIENT)
Dept: DERMATOLOGY | Facility: CLINIC | Age: 88
End: 2023-10-18
Payer: MEDICARE

## 2023-10-18 DIAGNOSIS — L90.5 SCAR CONDITIONS/SKIN FIBROSIS: Primary | ICD-10-CM

## 2023-10-18 DIAGNOSIS — Z85.828 HISTORY OF SKIN CANCER: ICD-10-CM

## 2023-10-18 DIAGNOSIS — Z12.83 SCREENING, MALIGNANT NEOPLASM, SKIN: ICD-10-CM

## 2023-10-18 DIAGNOSIS — L57.0 ACTINIC KERATOSES: ICD-10-CM

## 2023-10-18 DIAGNOSIS — L82.1 SEBORRHEIC KERATOSIS: ICD-10-CM

## 2023-10-18 PROCEDURE — 99999 PR PBB SHADOW E&M-EST. PATIENT-LVL II: ICD-10-PCS | Mod: PBBFAC,HCNC,, | Performed by: DERMATOLOGY

## 2023-10-18 PROCEDURE — 17000 PR DESTRUCTION(LASER SURGERY,CRYOSURGERY,CHEMOSURGERY),PREMALIGNANT LESIONS,FIRST LESION: ICD-10-PCS | Mod: HCNC,S$GLB,, | Performed by: DERMATOLOGY

## 2023-10-18 PROCEDURE — 1160F RVW MEDS BY RX/DR IN RCRD: CPT | Mod: HCNC,CPTII,S$GLB, | Performed by: DERMATOLOGY

## 2023-10-18 PROCEDURE — 99214 PR OFFICE/OUTPT VISIT, EST, LEVL IV, 30-39 MIN: ICD-10-PCS | Mod: 25,HCNC,S$GLB, | Performed by: DERMATOLOGY

## 2023-10-18 PROCEDURE — 1159F MED LIST DOCD IN RCRD: CPT | Mod: HCNC,CPTII,S$GLB, | Performed by: DERMATOLOGY

## 2023-10-18 PROCEDURE — 17003 DESTRUCTION, PREMALIGNANT LESIONS; SECOND THROUGH 14 LESIONS: ICD-10-PCS | Mod: HCNC,S$GLB,, | Performed by: DERMATOLOGY

## 2023-10-18 PROCEDURE — 1126F PR PAIN SEVERITY QUANTIFIED, NO PAIN PRESENT: ICD-10-PCS | Mod: HCNC,CPTII,S$GLB, | Performed by: DERMATOLOGY

## 2023-10-18 PROCEDURE — 17003 DESTRUCT PREMALG LES 2-14: CPT | Mod: HCNC,S$GLB,, | Performed by: DERMATOLOGY

## 2023-10-18 PROCEDURE — 17000 DESTRUCT PREMALG LESION: CPT | Mod: HCNC,S$GLB,, | Performed by: DERMATOLOGY

## 2023-10-18 PROCEDURE — 1126F AMNT PAIN NOTED NONE PRSNT: CPT | Mod: HCNC,CPTII,S$GLB, | Performed by: DERMATOLOGY

## 2023-10-18 PROCEDURE — 1159F PR MEDICATION LIST DOCUMENTED IN MEDICAL RECORD: ICD-10-PCS | Mod: HCNC,CPTII,S$GLB, | Performed by: DERMATOLOGY

## 2023-10-18 PROCEDURE — 1160F PR REVIEW ALL MEDS BY PRESCRIBER/CLIN PHARMACIST DOCUMENTED: ICD-10-PCS | Mod: HCNC,CPTII,S$GLB, | Performed by: DERMATOLOGY

## 2023-10-18 PROCEDURE — 99999 PR PBB SHADOW E&M-EST. PATIENT-LVL II: CPT | Mod: PBBFAC,HCNC,, | Performed by: DERMATOLOGY

## 2023-10-18 PROCEDURE — 99214 OFFICE O/P EST MOD 30 MIN: CPT | Mod: 25,HCNC,S$GLB, | Performed by: DERMATOLOGY

## 2023-10-18 NOTE — PATIENT INSTRUCTIONS

## 2023-10-18 NOTE — PROGRESS NOTES
Subjective:      Patient ID:  Isamar Roe is a 88 y.o. 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 Here today for new issue:    History of Present Illness: The patient presents with chief complaint of lesion.  Location: 7 days  Duration: left back  Signs/Symptoms: painful    Prior treatments: none          Review of Systems   Constitutional:  Negative for fever and chills.   Gastrointestinal:  Negative for nausea and vomiting.   Skin:  Positive for activity-related sunscreen use. Negative for daily sunscreen use and recent sunburn.   Hematologic/Lymphatic: Does not bruise/bleed easily.       Objective:   Physical Exam   Constitutional: She appears well-developed and well-nourished. No distress.   Neurological: She is alert and oriented to person, place, and time. She is not disoriented.   Psychiatric: She has a normal mood and affect.   Skin:   Areas Examined (abnormalities noted in diagram):   Head / Face Inspection Performed  Neck Inspection Performed  Chest / Axilla Inspection Performed  Abdomen Inspection Performed  Back Inspection Performed  RUE Inspected  LUE Inspection Performed  Nails and Digits Inspection Performed                 Diagram Legend     Erythematous scaling macule/papule c/w actinic keratosis       Vascular papule c/w angioma      Pigmented verrucoid papule/plaque c/w seborrheic keratosis      Yellow umbilicated papule c/w sebaceous hyperplasia      Irregularly shaped tan macule c/w lentigo     1-2 mm smooth white papules consistent with Milia      Movable subcutaneous cyst with punctum c/w epidermal inclusion cyst      Subcutaneous movable cyst c/w pilar cyst      Firm pink to brown papule c/w dermatofibroma      Pedunculated fleshy papule(s) c/w skin tag(s)      Evenly  pigmented macule c/w junctional nevus     Mildly variegated pigmented, slightly irregular-bordered macule c/w mildly atypical nevus      Flesh colored to evenly pigmented papule c/w intradermal nevus       Pink pearly papule/plaque c/w basal cell carcinoma      Erythematous hyperkeratotic cursted plaque c/w SCC      Surgical scar with no sign of skin cancer recurrence      Open and closed comedones      Inflammatory papules and pustules      Verrucoid papule consistent consistent with wart     Erythematous eczematous patches and plaques     Dystrophic onycholytic nail with subungual debris c/w onychomycosis     Umbilicated papule    Erythematous-base heme-crusted tan verrucoid plaque consistent with inflamed seborrheic keratosis     Erythematous Silvery Scaling Plaque c/w Psoriasis     See annotation      Assessment / Plan:        Scar conditions/skin fibrosis  Screening, malignant neoplasm, skin  History of skin cancer  Scar of the left cheek and right nose, hx of NMSC.  No evidence of recurrence on physical exam today.  Continue routine skin surveillance. Daily sunscreen advised.    Seborrheic keratosis  Reassurance given. Discussed diagnosis and that lesions are benign.  AAD handout given.     Actinic keratoses  Cryosurgery Procedure Note    The patient is informed of the precancerous quality and need for treatment of these lesions. After risks, benefits and alternatives explained, including blistering, pain, hyper- and hypopigmentation, patient verbally consents to cryotherapy to precancerous lesions. Liquid nitrogen cryosurgery is applied to the 13 actinic keratoses, as detailed in the physical exam, to produce a freeze injury. The patient is aware that blisters may form and is instructed on wound care with gentle cleansing and use of vaseline ointment to keep moist until healed. The patient is supplied a handout on cryosurgery and is instructed to call if lesions do not completely resolve.             Follow  up in about 6 months (around 4/18/2024).

## 2023-10-20 ENCOUNTER — HOSPITAL ENCOUNTER (EMERGENCY)
Facility: HOSPITAL | Age: 88
Discharge: HOME | End: 2023-10-20
Attending: EMERGENCY MEDICINE
Payer: MEDICARE

## 2023-10-20 ENCOUNTER — TELEPHONE (OUTPATIENT)
Dept: FAMILY MEDICINE | Facility: CLINIC | Age: 88
End: 2023-10-20
Payer: MEDICARE

## 2023-10-20 VITALS
BODY MASS INDEX: 26.98 KG/M2 | RESPIRATION RATE: 16 BRPM | DIASTOLIC BLOOD PRESSURE: 67 MMHG | WEIGHT: 129.06 LBS | TEMPERATURE: 99 F | HEART RATE: 79 BPM | SYSTOLIC BLOOD PRESSURE: 141 MMHG | OXYGEN SATURATION: 96 %

## 2023-10-20 DIAGNOSIS — M54.9 BACK PAIN, UNSPECIFIED BACK LOCATION, UNSPECIFIED BACK PAIN LATERALITY, UNSPECIFIED CHRONICITY: Primary | ICD-10-CM

## 2023-10-20 PROCEDURE — 99284 EMERGENCY DEPT VISIT MOD MDM: CPT | Mod: HCNC

## 2023-10-20 RX ORDER — BUTALBITAL, ACETAMINOPHEN AND CAFFEINE 50; 325; 40 MG/1; MG/1; MG/1
1 TABLET ORAL 3 TIMES DAILY PRN
Qty: 15 TABLET | Refills: 0 | Status: SHIPPED | OUTPATIENT
Start: 2023-10-20 | End: 2023-10-24

## 2023-10-20 RX ORDER — LIDOCAINE 50 MG/G
1 PATCH TOPICAL DAILY PRN
Qty: 15 PATCH | Refills: 0 | Status: SHIPPED | OUTPATIENT
Start: 2023-10-20 | End: 2023-10-24

## 2023-10-20 NOTE — TELEPHONE ENCOUNTER
----- Message from Regina Hays sent at 10/20/2023 11:43 AM CDT -----  Contact: Evelio/Spouse  Type:  Same Day Appointment Request    Caller is requesting a same day appointment.     Name of Caller:Evelio  When is the first available appointment?10/24/23  Symptoms:left shoulder pain  Best Call Back Number: [phone]  Additional Information: Patient's spouse request patient is seen today sooner than next available.   Thank you,  GH

## 2023-10-20 NOTE — ED PROVIDER NOTES
Encounter Date: 10/20/2023       History     Chief Complaint   Patient presents with    Back Pain     Pt c/o pain to upper left back that began yesterday.  Pt denies recent injury.       88-year-old female with complaint left upper back pain for the past 7 days.  Patient reports that she started having itching and burning at site of pain and now patient has pain in left upper back..  Denies fall.  Denies trauma.  Patient does reports she had a mole that was frozen above area of pain on Monday.        Review of patient's allergies indicates:   Allergen Reactions    Meloxicam Swelling    Ace inhibitors      Other reaction(s): cough    Amlodipine      Leg swelling    Chlorthalidone      Other reaction(s): ? dizzy  Other reaction(s): ? dizzy    Codeine Nausea And Vomiting    Demerol (pf)  [meperidine (pf)]      Other reaction(s): Itching    Fosamax  [alendronate]      Other reaction(s): aches    Fosamax plus d  [alendronate-vitamin d3]      Other reaction(s): aches    Hydrocodone-acetaminophen      Other reaction(s): Stomach upset  Other reaction(s): Nausea    Ibandronate      Other reaction(s): aches  Other reaction(s): aches    Ibandronate sodium      Other reaction(s): Unknown    Losartan Diarrhea    Meperidine Itching    Opioids - morphine analogues     Opium     Statins-hmg-coa reductase inhibitors      Few statins intol    Toprol xl  [metoprolol succinate]      Other reaction(s): diarrhea  Other reaction(s): diarrhea    Tramadol     Trazodone Other (See Comments)     Dry mouth     Past Medical History:   Diagnosis Date    Arthritis     hands    Benign hematuria     shingleton    Calcific tendinitis of left shoulder 1/27/2016    Cataract     Diverticulosis     colonoscopy 5/22/2012    Duodenal diverticulum     EGD 5/22/2012    GERD (gastroesophageal reflux disease)     Hemorrhoids     colonoscopy 5/22/2012    History of skin cancer     per patient s/p excision, no chemo or radiation, sees outside [facility], Dr. Tinsley.     Hyperlipidemia     Hypertension     Impingement syndrome of left shoulder 1/27/2016    Osteoporosis     declines tx;11/17/16 phone note    Pneumonia     as a child    Sciatica     [facility]; dr castillo    Skin cancer     Dr. Noriega    Vitamin D deficiency disease      Past Surgical History:   Procedure Laterality Date    APPENDECTOMY      CARPAL TUNNEL RELEASE Bilateral     CATARACT EXTRACTION W/  INTRAOCULAR LENS IMPLANT Left 05/01/2019    CATARACT EXTRACTION W/  INTRAOCULAR LENS IMPLANT Right 05/15/2019    CHOLECYSTECTOMY      HYSTERECTOMY      joint removal in right thumb      MINIMALLY INVASIVE TRANSFORAMINAL LUMBAR INTERBODY FUSION (TLIF) N/A 6/29/2021    Procedure: FUSION, SPINE, LUMBAR, TLIF, MINIMALLY INVASIVE;  Surgeon: Iraida Lozada MD;  Location: [facility] OR [address] [place] [address];  Service: Neurosurgery;  Laterality: N/A;  L4-5    SKIN CANCER EXCISION      SPINE SURGERY  9/16/14    L4/5 laminectomy;dr lozada    TOE SURGERY      LT FIFTH     Family History   Problem Relation Age of Onset    Heart disease Mother     Hypertension Mother     Cataracts Mother     Heart disease Father     Hypertension Father     Cataracts Father     Heart disease Brother 45    Heart disease Sister         A-fib    Heart disease Sister         heart bypass x 5    Kidney disease Brother 70    Heart disease Brother     Cancer Maternal Aunt         breast    Diabetes Maternal Grandmother     Diabetes Maternal Aunt     Hypertension Other         multiple family members    Cancer Other         multiple with skin cancer    Stroke Neg Hx     Melanoma Neg Hx     Hyperlipidemia Neg Hx      Social History     Tobacco Use    Smoking status: Never    Smokeless tobacco: Never   Substance Use Topics    Alcohol use: No    Drug use: No     Review of Systems   Constitutional:  Negative for fever.   HENT:  Negative for sore throat.    Respiratory:  Negative for shortness of breath.    Cardiovascular:  Negative for chest pain.   Gastrointestinal:  Negative for nausea.    Genitourinary:  Negative for dysuria.   Musculoskeletal:  Positive for back pain.   Skin:  Negative for rash.   Neurological:  Negative for weakness.   Hematological:  Does not bruise/bleed easily.       Physical Exam     Initial Vitals [10/20/23 1413]   BP Pulse Resp Temp SpO2   (!) 141/67 79 16 98.6 °F (37 °C) 96 %      MAP       --         Physical Exam    Nursing note and vitals reviewed.  Constitutional: She appears well-developed and well-nourished.   HENT:   Head: Normocephalic and atraumatic.   Eyes: Conjunctivae and EOM are normal. Pupils are equal, round, and reactive to light.   Neck: Neck supple.   Normal range of motion.  Cardiovascular:  Normal rate, regular rhythm, normal heart sounds and intact distal pulses.           Pulmonary/Chest: Breath sounds normal.   Abdominal: Abdomen is soft. There is no abdominal tenderness. There is no rebound and no guarding.   Musculoskeletal:         General: Normal range of motion.      Cervical back: Normal range of motion and neck supple.      Comments: Moderate tenderness left upper thoracic region near mid scapula, no lesions, no erythema, pain worse with any movement of left shoulder,     Neurological: She is alert and oriented to person, place, and time. She has normal strength and normal reflexes.   Skin: Skin is warm and dry.   3 mm scab left upper back, no erythema, no fluctuance   Psychiatric: She has a normal mood and affect. Her behavior is normal. Thought content normal.         ED Course   Procedures  Labs Reviewed   HIV 1 / 2 ANTIBODY   HEPATITIS C ANTIBODY   HEP C VIRUS HOLD SPECIMEN          Imaging Results    None          Medications - No data to display  Medical Decision Making  2:30 PM  Differential diagnosis include muscular strain versus early onset shingles without declaration of lesions    Risk  Prescription drug management.                               Clinical Impression:   Final diagnoses:  [M54.9] Back pain, unspecified back location,  unspecified back pain laterality, unspecified chronicity (Primary)        ED Disposition Condition    Discharge Stable          ED Prescriptions       Medication Sig Dispense Start Date End Date Auth. Provider    LIDOcaine (LIDODERM) 5 % Place 1 patch onto the skin daily as needed (pain). Remove & Discard patch within 12 hours or as directed by MD 15 patch 10/20/2023 -- David Brannon NP    butalbital-acetaminophen-caffeine -40 mg (FIORICET, ESGIC) -40 mg per tablet Take 1 tablet by mouth 3 (three) times daily as needed for Pain. 15 tablet 10/20/2023 -- David Brannon NP          Follow-up Information       Follow up With Specialties Details Why Contact Info    Alina Roe MD Internal Medicine Schedule an appointment as soon as possible for a visit  As needed [address]  [address]  [facility] [address]  [phone]               David Brannon NP  10/20/23 1938

## 2023-10-20 NOTE — TELEPHONE ENCOUNTER
I inform pt that unfortunately we do not have any available visits today . Pt joel and was advised to go to the urgent care . Pt joel

## 2023-10-23 ENCOUNTER — TELEPHONE (OUTPATIENT)
Dept: FAMILY MEDICINE | Facility: CLINIC | Age: 88
End: 2023-10-23
Payer: MEDICARE

## 2023-10-23 NOTE — TELEPHONE ENCOUNTER
----- Message from Angeal Henning sent at 10/23/2023  2:12 PM CDT -----  .Type:  Patient Returning Call    Who Called: Pt's daughter in law, Melissa  Who Left Message for Patient: Barbara  Does the patient know what this is regarding?:an appt today  Would the patient rather a call back or a response via [facility]? Call back  Best Call Back Number:.[phone]  Additional Information:

## 2023-10-23 NOTE — TELEPHONE ENCOUNTER
----- Message from Leonie Garibay sent at 10/23/2023  2:01 PM CDT -----  Contact: Antonina/daughter n law  Antonina needs a call back at [phone], Regards to getting an same day acces on today for shoulder pain.    Thanks  Td

## 2023-10-24 ENCOUNTER — OFFICE VISIT (OUTPATIENT)
Dept: PRIMARY CARE CLINIC | Facility: CLINIC | Age: 88
End: 2023-10-24
Payer: MEDICARE

## 2023-10-24 VITALS
DIASTOLIC BLOOD PRESSURE: 60 MMHG | OXYGEN SATURATION: 96 % | WEIGHT: 130.06 LBS | HEIGHT: 59 IN | HEART RATE: 81 BPM | SYSTOLIC BLOOD PRESSURE: 132 MMHG | TEMPERATURE: 99 F | BODY MASS INDEX: 26.22 KG/M2

## 2023-10-24 DIAGNOSIS — M54.6 ACUTE LEFT-SIDED THORACIC BACK PAIN: ICD-10-CM

## 2023-10-24 DIAGNOSIS — B02.9 HERPES ZOSTER WITHOUT COMPLICATION: Primary | ICD-10-CM

## 2023-10-24 PROCEDURE — 1101F PR PT FALLS ASSESS DOC 0-1 FALLS W/OUT INJ PAST YR: ICD-10-PCS | Mod: HCNC,CPTII,S$GLB, | Performed by: INTERNAL MEDICINE

## 2023-10-24 PROCEDURE — 1101F PT FALLS ASSESS-DOCD LE1/YR: CPT | Mod: HCNC,CPTII,S$GLB, | Performed by: INTERNAL MEDICINE

## 2023-10-24 PROCEDURE — 3288F PR FALLS RISK ASSESSMENT DOCUMENTED: ICD-10-PCS | Mod: HCNC,CPTII,S$GLB, | Performed by: INTERNAL MEDICINE

## 2023-10-24 PROCEDURE — 1125F PR PAIN SEVERITY QUANTIFIED, PAIN PRESENT: ICD-10-PCS | Mod: HCNC,CPTII,S$GLB, | Performed by: INTERNAL MEDICINE

## 2023-10-24 PROCEDURE — 99214 OFFICE O/P EST MOD 30 MIN: CPT | Mod: HCNC,S$GLB,, | Performed by: INTERNAL MEDICINE

## 2023-10-24 PROCEDURE — 1159F MED LIST DOCD IN RCRD: CPT | Mod: HCNC,CPTII,S$GLB, | Performed by: INTERNAL MEDICINE

## 2023-10-24 PROCEDURE — 99214 PR OFFICE/OUTPT VISIT, EST, LEVL IV, 30-39 MIN: ICD-10-PCS | Mod: HCNC,S$GLB,, | Performed by: INTERNAL MEDICINE

## 2023-10-24 PROCEDURE — 99999 PR PBB SHADOW E&M-EST. PATIENT-LVL III: ICD-10-PCS | Mod: PBBFAC,HCNC,, | Performed by: INTERNAL MEDICINE

## 2023-10-24 PROCEDURE — 3288F FALL RISK ASSESSMENT DOCD: CPT | Mod: HCNC,CPTII,S$GLB, | Performed by: INTERNAL MEDICINE

## 2023-10-24 PROCEDURE — 1125F AMNT PAIN NOTED PAIN PRSNT: CPT | Mod: HCNC,CPTII,S$GLB, | Performed by: INTERNAL MEDICINE

## 2023-10-24 PROCEDURE — 99999 PR PBB SHADOW E&M-EST. PATIENT-LVL III: CPT | Mod: PBBFAC,HCNC,, | Performed by: INTERNAL MEDICINE

## 2023-10-24 PROCEDURE — 1159F PR MEDICATION LIST DOCUMENTED IN MEDICAL RECORD: ICD-10-PCS | Mod: HCNC,CPTII,S$GLB, | Performed by: INTERNAL MEDICINE

## 2023-10-24 RX ORDER — GABAPENTIN 300 MG/1
300 CAPSULE ORAL 3 TIMES DAILY
Qty: 90 CAPSULE | Refills: 0 | Status: SHIPPED | OUTPATIENT
Start: 2023-10-24 | End: 2024-02-22

## 2023-10-24 RX ORDER — VALACYCLOVIR HYDROCHLORIDE 1 G/1
1000 TABLET, FILM COATED ORAL 3 TIMES DAILY
Qty: 30 TABLET | Refills: 0 | Status: SHIPPED | OUTPATIENT
Start: 2023-10-24 | End: 2024-10-23

## 2023-11-06 NOTE — PROGRESS NOTES
Assessment/Plan:    Problem List Items Addressed This Visit    None  Visit Diagnoses       Herpes zoster without complication    -  Primary    Relevant Medications    gabapentin (NEURONTIN) 300 MG capsule    valACYclovir (VALTREX) 1000 MG tablet    UNABLE TO FIND    Acute left-sided thoracic back pain      -possibly early zoster outbreak without rash  -no hx of fall or trauma  -will start on oral anti-viral  -providing PO gabapentin and topical analgesic to help with pain  -follow up closely if symptoms continue    Relevant Medications    gabapentin (NEURONTIN) 300 MG capsule    UNABLE TO FIND            Follow up if symptoms worsen or fail to improve.    Alina Roe MD  _____________________________________________________________________________________________________________________________________________________    CC: back pain    HPI:    Patient is in clinic today as an established patient.    Pain at L upper back/shoulder. Present for the past 2 weeks, progressing. Pain described as a burning sensation. Present with even light tough. Denies any injury or trauma to the area. Had a mole removed from the area about 10 days ago but states that symptoms were present several days prior to that. Was evaluated in the ER last week and given diagnosis of possible muscle strain vs early shingles. She still has not noticed any new rash in the area. She has never had shingles but she is vaccinated. She was given oral fioricet and topical lidocaine which has helped some with pain.    No other new complaints today.     Current Outpatient Medications on File Prior to Visit   Medication Sig Dispense Refill    ciclopirox (LOPROX) 0.77 % Crea AAA bid after cool blow dry. Patient instructions: mix 30 grams of triamcinolone 0.1% cream with 30 grams of ciclopirox cream in 120 cc of Milk of Magnesia 30 g 3    hydroCHLOROthiazide (MICROZIDE) 12.5 mg capsule TAKE 1 CAPSULE BY MOUTH EVERY DAY 90 capsule 3    triamcinolone  "acetonide 0.1% (KENALOG) 0.1 % cream AAA bid after cool blow dry. Patient instructions: mix 30 grams of triamcinolone 0.1% cream with 30 grams of ciclopirox cream in 120 cc of Milk of Magnesia 30 g 3    verapamiL (CALAN) 120 MG tablet TAKE 1 TABLET BY MOUTH 2 TIMES A  tablet 3     No current facility-administered medications on file prior to visit.       Review of Systems   Constitutional:  Negative for chills, diaphoresis, fatigue and fever.   HENT:  Negative for congestion, ear pain, postnasal drip, sinus pain and sore throat.    Eyes:  Negative for pain and redness.   Respiratory:  Negative for cough, chest tightness and shortness of breath.    Cardiovascular:  Negative for chest pain and leg swelling.   Gastrointestinal:  Negative for abdominal pain, constipation, diarrhea, nausea and vomiting.   Genitourinary:  Negative for dysuria and hematuria.   Musculoskeletal:  Positive for back pain. Negative for arthralgias and joint swelling.   Skin:  Negative for rash.   Neurological:  Negative for dizziness, syncope and headaches.   Psychiatric/Behavioral:  Negative for dysphoric mood. The patient is not nervous/anxious.      Vitals:    10/24/23 1103   BP: 132/60   Pulse: 81   Temp: 98.5 °F (36.9 °C)   SpO2: 96%   Weight: 59 kg (130 lb 1.1 oz)   Height: 4' 11" (1.499 m)       Wt Readings from Last 3 Encounters:   10/24/23 59 kg (130 lb 1.1 oz)   10/20/23 58.6 kg (129 lb 1.3 oz)   09/06/23 59.5 kg (131 lb 2.8 oz)       Physical Exam  Constitutional:       General: She is not in acute distress.     Appearance: She is well-developed.   HENT:      Head: Normocephalic and atraumatic.   Pulmonary:      Effort: Pulmonary effort is normal. No respiratory distress.   Abdominal:      General: There is no distension.   Musculoskeletal:         General: Tenderness present. No swelling, deformity or signs of injury. Normal range of motion.      Cervical back: Normal range of motion.      Comments: Pain with just light " touch to L posterior shoulder/upper back   Neurological:      Mental Status: She is alert and oriented to person, place, and time.   Psychiatric:         Mood and Affect: Mood normal.         Behavior: Behavior normal.     Health Maintenance   Topic Date Due    TETANUS VACCINE  06/25/2018    Lipid Panel  04/25/2024    DEXA Scan  04/25/2025    Shingles Vaccine  Completed

## 2024-02-09 NOTE — TELEPHONE ENCOUNTER
Spoke to [facility] stated that they need pt Isamar Alcala plan of care forms signed and faxed.    After having trouble faxing the staff informed me that they will just pick it up on Wednesday after lunch.    Understanding verbalized.    Grandson

## 2024-02-16 ENCOUNTER — TELEPHONE (OUTPATIENT)
Dept: FAMILY MEDICINE | Facility: CLINIC | Age: 89
End: 2024-02-16
Payer: MEDICARE

## 2024-02-16 NOTE — TELEPHONE ENCOUNTER
----- Message from Heather Capps sent at 2/16/2024 10:49 AM CST -----  Regarding: same day appt  Name of caller: tracie       What is the requesting detail: pt has a cough that she cannot get rid of. Requesting to be seen today.Please advise       Can the clinic reply by [facility]:       What number to call back:[phone]

## 2024-02-20 ENCOUNTER — OFFICE VISIT (OUTPATIENT)
Dept: PRIMARY CARE CLINIC | Facility: CLINIC | Age: 89
End: 2024-02-20
Payer: MEDICARE

## 2024-02-20 VITALS
TEMPERATURE: 98 F | SYSTOLIC BLOOD PRESSURE: 138 MMHG | DIASTOLIC BLOOD PRESSURE: 64 MMHG | BODY MASS INDEX: 25.35 KG/M2 | OXYGEN SATURATION: 96 % | WEIGHT: 125.75 LBS | HEIGHT: 59 IN

## 2024-02-20 DIAGNOSIS — R41.3 MEMORY LOSS: ICD-10-CM

## 2024-02-20 DIAGNOSIS — F51.01 PRIMARY INSOMNIA: ICD-10-CM

## 2024-02-20 DIAGNOSIS — Z79.899 ENCOUNTER FOR LONG-TERM (CURRENT) USE OF HIGH-RISK MEDICATION: ICD-10-CM

## 2024-02-20 DIAGNOSIS — I70.0 ARTERIOSCLEROSIS OF AORTA: ICD-10-CM

## 2024-02-20 DIAGNOSIS — E78.2 MIXED HYPERLIPIDEMIA: ICD-10-CM

## 2024-02-20 DIAGNOSIS — R05.9 COUGH, UNSPECIFIED TYPE: Primary | ICD-10-CM

## 2024-02-20 DIAGNOSIS — I10 ESSENTIAL HYPERTENSION: ICD-10-CM

## 2024-02-20 DIAGNOSIS — B02.9 HERPES ZOSTER WITHOUT COMPLICATION: ICD-10-CM

## 2024-02-20 DIAGNOSIS — J06.9 UPPER RESPIRATORY TRACT INFECTION, UNSPECIFIED TYPE: ICD-10-CM

## 2024-02-20 DIAGNOSIS — M54.6 ACUTE LEFT-SIDED THORACIC BACK PAIN: ICD-10-CM

## 2024-02-20 LAB
CTP QC/QA: YES
CTP QC/QA: YES
POC MOLECULAR INFLUENZA A AGN: NEGATIVE
POC MOLECULAR INFLUENZA B AGN: NEGATIVE
SARS-COV-2 RDRP RESP QL NAA+PROBE: NEGATIVE

## 2024-02-20 PROCEDURE — 99999 PR PBB SHADOW E&M-EST. PATIENT-LVL IV: CPT | Mod: PBBFAC,,, | Performed by: INTERNAL MEDICINE

## 2024-02-20 PROCEDURE — 1126F AMNT PAIN NOTED NONE PRSNT: CPT | Mod: CPTII,S$GLB,, | Performed by: INTERNAL MEDICINE

## 2024-02-20 PROCEDURE — 99214 OFFICE O/P EST MOD 30 MIN: CPT | Mod: S$GLB,,, | Performed by: INTERNAL MEDICINE

## 2024-02-20 PROCEDURE — 87502 INFLUENZA DNA AMP PROBE: CPT | Mod: QW,S$GLB,, | Performed by: INTERNAL MEDICINE

## 2024-02-20 PROCEDURE — 3288F FALL RISK ASSESSMENT DOCD: CPT | Mod: CPTII,S$GLB,, | Performed by: INTERNAL MEDICINE

## 2024-02-20 PROCEDURE — 1101F PT FALLS ASSESS-DOCD LE1/YR: CPT | Mod: CPTII,S$GLB,, | Performed by: INTERNAL MEDICINE

## 2024-02-20 PROCEDURE — 87635 SARS-COV-2 COVID-19 AMP PRB: CPT | Mod: QW,S$GLB,, | Performed by: INTERNAL MEDICINE

## 2024-02-20 RX ORDER — TRAZODONE HYDROCHLORIDE 50 MG/1
50 TABLET ORAL NIGHTLY
Qty: 30 TABLET | Refills: 1 | Status: SHIPPED | OUTPATIENT
Start: 2024-02-20 | End: 2024-03-20

## 2024-02-20 RX ORDER — FLUTICASONE PROPIONATE 50 MCG
1 SPRAY, SUSPENSION (ML) NASAL DAILY
Qty: 16 G | Refills: 0 | Status: SHIPPED | OUTPATIENT
Start: 2024-02-20

## 2024-02-20 RX ORDER — PROMETHAZINE HYDROCHLORIDE AND DEXTROMETHORPHAN HYDROBROMIDE 6.25; 15 MG/5ML; MG/5ML
5 SYRUP ORAL EVERY 6 HOURS PRN
Qty: 118 ML | Refills: 0 | Status: SHIPPED | OUTPATIENT
Start: 2024-02-20 | End: 2024-03-01

## 2024-02-20 NOTE — PATIENT INSTRUCTIONS
-start Coricidin as needed, as well as once daily flonase. If cold symptoms not improved by Friday, please call/message and I will start on antibiotic

## 2024-02-22 RX ORDER — GABAPENTIN 300 MG/1
300 CAPSULE ORAL 3 TIMES DAILY
Qty: 90 CAPSULE | Refills: 0 | Status: SHIPPED | OUTPATIENT
Start: 2024-02-22 | End: 2024-03-11

## 2024-02-29 ENCOUNTER — LAB VISIT (OUTPATIENT)
Dept: LAB | Facility: HOSPITAL | Age: 89
End: 2024-02-29
Attending: INTERNAL MEDICINE
Payer: MEDICARE

## 2024-02-29 DIAGNOSIS — I70.0 ARTERIOSCLEROSIS OF AORTA: ICD-10-CM

## 2024-02-29 DIAGNOSIS — Z79.899 ENCOUNTER FOR LONG-TERM (CURRENT) USE OF HIGH-RISK MEDICATION: ICD-10-CM

## 2024-02-29 DIAGNOSIS — I10 ESSENTIAL HYPERTENSION: Chronic | ICD-10-CM

## 2024-02-29 DIAGNOSIS — R41.3 MEMORY LOSS: ICD-10-CM

## 2024-02-29 DIAGNOSIS — E78.2 MIXED HYPERLIPIDEMIA: Chronic | ICD-10-CM

## 2024-02-29 LAB
ALBUMIN SERPL BCP-MCNC: 3.6 G/DL (ref 3.5–5.2)
ALP SERPL-CCNC: 81 U/L (ref 55–135)
ALT SERPL W/O P-5'-P-CCNC: 12 U/L (ref 10–44)
ANION GAP SERPL CALC-SCNC: 11 MMOL/L (ref 8–16)
AST SERPL-CCNC: 17 U/L (ref 10–40)
BILIRUB SERPL-MCNC: 0.5 MG/DL (ref 0.1–1)
BUN SERPL-MCNC: 19 MG/DL (ref 8–23)
CALCIUM SERPL-MCNC: 10.1 MG/DL (ref 8.7–10.5)
CHLORIDE SERPL-SCNC: 104 MMOL/L (ref 95–110)
CHOLEST SERPL-MCNC: 238 MG/DL (ref 120–199)
CHOLEST/HDLC SERPL: 3.8 {RATIO} (ref 2–5)
CO2 SERPL-SCNC: 26 MMOL/L (ref 23–29)
CREAT SERPL-MCNC: 0.7 MG/DL (ref 0.5–1.4)
ERYTHROCYTE [DISTWIDTH] IN BLOOD BY AUTOMATED COUNT: 13 % (ref 11.5–14.5)
EST. GFR  (NO RACE VARIABLE): >60 ML/MIN/1.73 M^2
FOLATE SERPL-MCNC: 10.7 NG/ML (ref 4–24)
GLUCOSE SERPL-MCNC: 84 MG/DL (ref 70–110)
HCT VFR BLD AUTO: 45.6 % (ref 37–48.5)
HDLC SERPL-MCNC: 62 MG/DL (ref 40–75)
HDLC SERPL: 26.1 % (ref 20–50)
HGB BLD-MCNC: 14.3 G/DL (ref 12–16)
LDLC SERPL CALC-MCNC: 153 MG/DL (ref 63–159)
MCH RBC QN AUTO: 29 PG (ref 27–31)
MCHC RBC AUTO-ENTMCNC: 31.4 G/DL (ref 32–36)
MCV RBC AUTO: 93 FL (ref 82–98)
NONHDLC SERPL-MCNC: 176 MG/DL
PLATELET # BLD AUTO: 357 K/UL (ref 150–450)
PMV BLD AUTO: 9.5 FL (ref 9.2–12.9)
POTASSIUM SERPL-SCNC: 3.8 MMOL/L (ref 3.5–5.1)
PROT SERPL-MCNC: 7.4 G/DL (ref 6–8.4)
RBC # BLD AUTO: 4.93 M/UL (ref 4–5.4)
SODIUM SERPL-SCNC: 141 MMOL/L (ref 136–145)
TRIGL SERPL-MCNC: 115 MG/DL (ref 30–150)
TSH SERPL DL<=0.005 MIU/L-ACNC: 1.88 UIU/ML (ref 0.4–4)
VIT B12 SERPL-MCNC: 1084 PG/ML (ref 210–950)
WBC # BLD AUTO: 6.79 K/UL (ref 3.9–12.7)

## 2024-02-29 PROCEDURE — 36415 COLL VENOUS BLD VENIPUNCTURE: CPT | Mod: PO | Performed by: INTERNAL MEDICINE

## 2024-02-29 PROCEDURE — 84425 ASSAY OF VITAMIN B-1: CPT | Performed by: INTERNAL MEDICINE

## 2024-02-29 PROCEDURE — 80053 COMPREHEN METABOLIC PANEL: CPT | Performed by: INTERNAL MEDICINE

## 2024-02-29 PROCEDURE — 85027 COMPLETE CBC AUTOMATED: CPT | Performed by: INTERNAL MEDICINE

## 2024-02-29 PROCEDURE — 84443 ASSAY THYROID STIM HORMONE: CPT | Performed by: INTERNAL MEDICINE

## 2024-02-29 PROCEDURE — 86592 SYPHILIS TEST NON-TREP QUAL: CPT | Performed by: INTERNAL MEDICINE

## 2024-02-29 PROCEDURE — 80061 LIPID PANEL: CPT | Performed by: INTERNAL MEDICINE

## 2024-02-29 PROCEDURE — 82746 ASSAY OF FOLIC ACID SERUM: CPT | Performed by: INTERNAL MEDICINE

## 2024-02-29 PROCEDURE — 82607 VITAMIN B-12: CPT | Performed by: INTERNAL MEDICINE

## 2024-03-01 LAB — RPR SER QL: NORMAL

## 2024-03-05 LAB — VIT B1 BLD-MCNC: 65 UG/L (ref 38–122)

## 2024-03-20 DIAGNOSIS — F51.01 PRIMARY INSOMNIA: ICD-10-CM

## 2024-03-20 RX ORDER — TRAZODONE HYDROCHLORIDE 50 MG/1
50 TABLET ORAL NIGHTLY
Qty: 90 TABLET | Refills: 3 | Status: SHIPPED | OUTPATIENT
Start: 2024-03-20

## 2024-03-20 NOTE — TELEPHONE ENCOUNTER
No care due was identified.  [facility] Embedded Care Due Messages. Reference number: [id number].   3/20/2024 2:50:27 PM CDT

## 2024-03-20 NOTE — TELEPHONE ENCOUNTER
Refill Routing Note   Medication(s) are not appropriate for processing by [facility] for the following reason(s):        Allergy or intolerance: Allergy/Contraindication: Trazodone Reactions: Other (see Comments)     [facility] action(s):  Defer      Medication Therapy Plan:         Appointments  past 12m or future 3m with PCP    Date Provider   Last Visit   2/20/2024 Alina Roe MD   Next Visit   Visit date not found Alina Roe MD   ED visits in past 90 days: 0        Note composed:6:57 PM 03/20/2024

## 2024-03-29 NOTE — PROGRESS NOTES
Assessment/Plan:    Problem List Items Addressed This Visit          Cardiac/Vascular    Mixed hyperlipidemia    Overview     -chronic condition. Currently stable.    -hx of statin intolerance  -taking daily fish oil  -most recent labs listed below; repeat labs scheduled  Lab Results   Component Value Date    CHOL 238 (H) 02/29/2024     Lab Results   Component Value Date    HDL 62 02/29/2024     Lab Results   Component Value Date    LDLCALC 153.0 02/29/2024     Lab Results   Component Value Date    TRIG 115 02/29/2024     Lab Results   Component Value Date    ALT 12 02/29/2024    AST 17 02/29/2024    ALKPHOS 81 02/29/2024    BILITOT 0.5 02/29/2024          Essential hypertension    Overview     -at goal today  -currently on HCTZ 12.5 mg QD, verapamil 120 mg BID  -Did not tolerate ACE, Norvasc, Chlorothiadone in the past, per last PCP notes  -continue lifestyle modification with low sodium diet and exercise   -discussed hypertension disease course and importance of treating high blood pressure  -patient understood and advised of risk of untreated blood pressure.  ER precautions were given   for symptoms of hypertensive urgency and emergency.         Arteriosclerosis of aorta    Overview     -CT Abdomen/Pelvis 4/2/2012---Arteriosclerotic disease in the aorta and iliac arteries  -hx of statin intolerance          Other Visit Diagnoses       Cough, unspecified type    -  Primary    Relevant Orders    POCT Influenza A/B Molecular (Completed)    POCT COVID-19 Rapid Screening (Completed)    Upper respiratory tract infection, unspecified type      -presentation consistent with URI  -continue symptom management with antihistamines, cough suppressants, and nasal steroids  -rest and increase fluid intake  -follow up in several days if symptoms not improved    Relevant Medications    fluticasone propionate (FLONASE) 50 mcg/actuation nasal spray    Primary insomnia        Memory loss        Relevant Orders    TSH (Completed)     Vitamin B12 (Completed)    Folate (Completed)    Vitamin B1 (Completed)    RPR (Completed)    Encounter for long-term (current) use of high-risk medication        Relevant Orders    TSH (Completed)    Vitamin B12 (Completed)    Folate (Completed)    Vitamin B1 (Completed)    RPR (Completed)            Alina Roe MD  _____________________________________________________________________________________________________________________________________________________    CC: follow up of chronic medical conditions     HPI:    Patient is in clinic today as an established patient.    HTN: The patient is currently being treated for essential hypertension. This condition is chronic and stable. The patient is tolerating their medication well with good compliance.  Denies any adverse effects of medications.  Counseling was offered regarding low sodium diet.  The patient has a reduced salt intake. Routine exercise recommended. The patient denies headache, vision changes, chest pain, palpitations, shortness of breath, or lower extremity edema.    Cough: Patient complains of nonproductive cough.  Symptoms began several days ago.  The cough is non-productive, without wheezing, dyspnea or hemoptysis and is aggravated by nothing Associated symptoms include:postnasal drip and nasal congestion . Patient does not have new pets. Patient does not have a history of asthma. Patient does not have a history of environmental allergens. Patient does not have recent travel. Patient does not have a history of smoking.    No other new complaints today.  Remaining chronic conditions have been reviewed and remain stable. Further detail as stated above.      reviewed.     No recent changes to medical/surgical history.    Current Outpatient Medications on File Prior to Visit   Medication Sig Dispense Refill    ciclopirox (LOPROX) 0.77 % Crea AAA bid after cool blow dry. Patient instructions: mix 30 grams of triamcinolone 0.1% cream with 30  "grams of ciclopirox cream in 120 cc of Milk of Magnesia 30 g 3    hydroCHLOROthiazide (MICROZIDE) 12.5 mg capsule TAKE 1 CAPSULE BY MOUTH EVERY DAY 90 capsule 3    triamcinolone acetonide 0.1% (KENALOG) 0.1 % cream AAA bid after cool blow dry. Patient instructions: mix 30 grams of triamcinolone 0.1% cream with 30 grams of ciclopirox cream in 120 cc of Milk of Magnesia 30 g 3    UNABLE TO FIND Ketoprofen 5%, Gabapentin 5%, Amitriptyline 2%, Tetracaine 4%  Apply 1 to 2 grams up to 4 times daily as directed for additional pain relief 1 Tube 0    valACYclovir (VALTREX) 1000 MG tablet Take 1 tablet (1,000 mg total) by mouth 3 (three) times daily. 30 tablet 0    verapamiL (CALAN) 120 MG tablet TAKE 1 TABLET BY MOUTH 2 TIMES A  tablet 3     No current facility-administered medications on file prior to visit.       Review of Systems   Constitutional:  Negative for chills, diaphoresis, fatigue and fever.   HENT:  Negative for congestion, ear pain, postnasal drip, sinus pain and sore throat.    Eyes:  Negative for pain and redness.   Respiratory:  Positive for cough. Negative for chest tightness and shortness of breath.    Cardiovascular:  Negative for chest pain and leg swelling.   Gastrointestinal:  Negative for abdominal pain, constipation, diarrhea, nausea and vomiting.   Genitourinary:  Negative for dysuria and hematuria.   Musculoskeletal:  Negative for arthralgias and joint swelling.   Skin:  Negative for rash.   Neurological:  Negative for dizziness, syncope and headaches.   Psychiatric/Behavioral:  Negative for dysphoric mood. The patient is not nervous/anxious.      Vitals:    02/20/24 1023   BP: 138/64   Temp: 97.8 °F (36.6 °C)   SpO2: 96%   Weight: 57.1 kg (125 lb 12.4 oz)   Height: 4' 11" (1.499 m)       Wt Readings from Last 3 Encounters:   02/20/24 57.1 kg (125 lb 12.4 oz)   10/24/23 59 kg (130 lb 1.1 oz)   10/20/23 58.6 kg (129 lb 1.3 oz)       Physical Exam  Constitutional:       General: She is " not in acute distress.     Appearance: Normal appearance. She is well-developed.   HENT:      Head: Normocephalic and atraumatic.   Eyes:      Conjunctiva/sclera: Conjunctivae normal.   Cardiovascular:      Rate and Rhythm: Normal rate and regular rhythm.      Pulses: Normal pulses.      Heart sounds: Normal heart sounds. No murmur heard.  Pulmonary:      Effort: Pulmonary effort is normal. No respiratory distress.      Breath sounds: Normal breath sounds. No wheezing, rhonchi or rales.   Abdominal:      General: Bowel sounds are normal. There is no distension.      Palpations: Abdomen is soft.      Tenderness: There is no abdominal tenderness.   Musculoskeletal:         General: Normal range of motion.      Cervical back: Normal range of motion and neck supple.   Skin:     General: Skin is warm and dry.      Findings: No rash.   Neurological:      General: No focal deficit present.      Mental Status: She is alert and oriented to person, place, and time.   Psychiatric:         Mood and Affect: Mood normal.         Behavior: Behavior normal.     Health Maintenance   Topic Date Due    TETANUS VACCINE  06/25/2018    Lipid Panel  02/28/2025    DEXA Scan  04/25/2025    Shingles Vaccine  Completed

## 2024-03-29 NOTE — PROGRESS NOTES
Results have been released via [facility]. Please verify that these have been viewed by patient. If not, please call patient with results.      I have reviewed your recent results.    CBC NORMAL-The CBC appears to be stable at this time with no sign of major anemia, abnormal white count or platelet abnormality.  CMP/BMP NORMAL-The electrolytes all appear stable at this time.  This includes kidney functions along with routine electrolytes like sugar, potassium and sodium.  If it was a CMP test, the liver enzymes were noted to be stable also.  TSH NORMAL-The TSH screening indicated a normal function of the thyroid.  Vitamin levels normal.    Please let me know if you have any additional questions or concerns about above.

## 2024-05-22 NOTE — PROGRESS NOTES
Acute problem   NPO after midnight   Morphine p.r.n. pain   Zofran p.r.n. nausea   Orthopedics consulted   Plans for OR in a.m.   PT/OT consult when appropriate        Isamar Roe  09/09/2019  [id number]    aMrina Garibay MD  Patient Care Team:  Marina Garibay MD as PCP - General (Family Medicine)  CASEY Giang OD as Consulting Physician (Optometry)  Reinaldo Noriega III, MD (Dermatology)  Leticia Maya MD as Consulting Physician (Endocrinology)  Oscar Hanson LPN as Care Coordinator (Internal Medicine)  Gunnar Almanzar MD as Consulting Physician (Pain Medicine)  Reinaldo Noriega III, MD (Dermatology)  Has the patient seen any provider outside of the [facility] network since the last visit? (no). If yes, HIPPA forms completed and records requested.        Visit Type:Annual    Chief Complaint:  Chief Complaint   Patient presents with    Annual Exam       History of Present Illness:    Patient here for annual.  No new complaints.  See Problem oriented charting below.    Labs done in August for check up.    She continues to have lower back pain, and she had CT and MRI.  She wants second opinion.  She had injection in back, and then in hip.  THe hip didn't help.  She said Neuro Sx cancelled her appt.    Rash over the weekend.     History:  Past Medical History:   Diagnosis Date    Arthritis     hands    Benign hematuria     shingleton    Calcific tendinitis of left shoulder 1/27/2016    Cataract     Diverticulosis     colonoscopy 5/22/2012    Duodenal diverticulum     EGD 5/22/2012    GERD (gastroesophageal reflux disease)     Hemorrhoids     colonoscopy 5/22/2012    History of skin cancer     per patient s/p excision, no chemo or radiation, sees outside derm, Dr. Tinsley.    Hyperlipidemia     Hypertension     Impingement syndrome of left shoulder 1/27/2016    Osteoporosis     declines tx;11/17/16 phone note    Pneumonia     as a child    Sciatica     [place]; dr castillo    Skin cancer     Dr. Noriega    Vitamin D deficiency disease      Past Surgical History:   Procedure Laterality Date    APPENDECTOMY      CARPAL TUNNEL RELEASE Bilateral     CATARACT EXTRACTION W/   INTRAOCULAR LENS IMPLANT Left 05/01/2019    CATARACT EXTRACTION W/  INTRAOCULAR LENS IMPLANT Right 05/15/2019    CHOLECYSTECTOMY      FACETECTOMY Left 9/16/2014    [facility] 2ND FLR    HYSTERECTOMY      joint removal in right thumb      LAMINECTOMY-ANGELIA-SEMI Left 9/16/2014    Performed [facility] at [facility] OR 2ND FLR    MICRODISKECTOMY-MINIMALLY INVASIVE Left 9/16/2014    Per[facility] at [facility] OR 2ND FLR    SKIN CANCER EXCISION      SPINE SURGERY  9/16/14    L4/5 laminectomy;dr lozada    TOE SURGERY      LT FIFTH     Family History   Problem Relation Age of Onset    Heart disease Mother     Hypertension Mother     Cataracts Mother     Heart disease Father     Hypertension Father     Cataracts Father     Heart disease Brother 45    Heart disease Sister         A-fib    Heart disease Sister         heart bypass x 5    Kidney disease Brother 70    Heart disease Brother     Cancer Maternal Aunt         breast    Diabetes Maternal Grandmother     Diabetes Maternal Aunt     Hypertension Other         multiple family members    Cancer Other         multiple with skin cancer    Stroke Neg Hx     Melanoma Neg Hx     Hyperlipidemia Neg Hx      Social History     Socioeconomic History    Marital status:      Spouse name: AMBREEN    Number of children: 4    Years of education: Not on file    Highest education level: Not on file   Occupational History    Not on file   Social Needs    Financial resource strain: Not on file    Food insecurity:     Worry: Not on file     Inability: Not on file    Transportation needs:     Medical: Not on file     Non-medical: Not on file   Tobacco Use    Smoking status: Never Smoker    Smokeless tobacco: Never Used   Substance and Sexual Activity    Alcohol use: No    Drug use: No    Sexual activity: Yes     Partners: Male     Birth control/protection: See Surgical Hx   Lifestyle    Physical  activity:     Days per week: Not on file     Minutes per session: Not on file    Stress: Not on file   Relationships    Social connections:     Talks on phone: Not on file     Gets together: Not on file     Attends Jainism service: Not on file     Active member of club or organization: Not on file     Attends meetings of clubs or organizations: Not on file     Relationship status: Not on file   Other Topics Concern    Are you pregnant or think you may be? No    Breast-feeding No   Social History Narrative    Wears a seatbelt.     Patient Active Problem List   Diagnosis    HTN (hypertension)    Hyperlipidemia    Hiatal hernia with gastroesophageal reflux    Lumbar arthropathy    Osteoporosis    Nuclear sclerosis of both eyes    Choroidal nevus of right eye    Arteriosclerosis of aorta    Heart valve regurgitation    History of skin cancer    Statin intolerance    Pseudophakia    Sacroiliac joint dysfunction     Review of patient's allergies indicates:   Allergen Reactions    Ace inhibitors      Other reaction(s): cough    Amlodipine      Leg swelling    Chlorthalidone      Other reaction(s): ? dizzy  Other reaction(s): ? dizzy    Codeine Nausea And Vomiting    Demerol (pf)  [meperidine (pf)]      Other reaction(s): Itching    Fosamax  [alendronate]      Other reaction(s): aches    Fosamax plus d  [alendronate-vitamin d3]      Other reaction(s): aches    Hydrochlorothiazide (bulk)      Other reaction(s): bad taste    Hydrocodone-acetaminophen      Other reaction(s): Stomach upset  Other reaction(s): Nausea    Ibandronate      Other reaction(s): aches  Other reaction(s): aches    Ibandronate sodium      Other reaction(s): Unknown    Losartan Diarrhea    Meperidine Itching    Opioids - morphine analogues     Opium     Statins-hmg-coa reductase inhibitors      Few statins intol    Toprol xl  [metoprolol succinate]      Other reaction(s): diarrhea  Other reaction(s): diarrhea     Tramadol        The following were reviewed at this visit: active problem list, medication list, allergies, family history, social history, and health maintenance.    Medications:  Current Outpatient Medications on File Prior to Visit   Medication Sig Dispense Refill    calcium-vitamin D 250-100 mg-unit per tablet Take 1 tablet by mouth.      omega-3/dha/epa/fish oil (OMEGA-3 FISH OIL ORAL) Take by mouth.      verapamil (CALAN) 120 MG tablet Take 1 tablet (120 mg total) by mouth 2 (two) times daily. 60 tablet 5    vitamin E 400 UNIT capsule Take 400 Units by mouth once daily.       No current facility-administered medications on file prior to visit.        Medications have been reviewed and reconciled with patient at this visit.  Barriers to medications present (no)    Adverse reactions to current medications (no)    Over the counter medications reviewed (Yes ), and if needed added to active Medication list at this visit.     Exam:  Wt Readings from Last 3 Encounters:   09/09/19 61.5 kg (135 lb 9.3 oz)   07/16/19 61.2 kg (134 lb 14.7 oz)   06/28/19 61.1 kg (134 lb 13 oz)     Temp Readings from Last 3 Encounters:   09/09/19 96.8 °F (36 °C) (Tympanic)   06/27/19 96.5 °F (35.8 °C)   06/13/19 96 °F (35.6 °C) (Tympanic)     BP Readings from Last 3 Encounters:   09/09/19 (!) 138/52   07/16/19 (!) 156/70   06/28/19 124/69     Pulse Readings from Last 3 Encounters:   09/09/19 85   07/16/19 83   06/28/19 75     Body mass index is 27.38 kg/m².      Review of Systems   Constitutional: Negative.  Negative for chills and fever.   HENT: Negative.  Negative for congestion, sinus pain and sore throat.    Eyes: Negative for blurred vision and double vision.   Respiratory: Negative for cough, sputum production, shortness of breath and wheezing.    Cardiovascular: Negative for chest pain, palpitations and leg swelling.   Gastrointestinal: Negative for abdominal pain, constipation, diarrhea, heartburn, nausea and vomiting.    Genitourinary: Negative.    Musculoskeletal: Positive for back pain and joint pain.   Skin: Positive for rash.   Neurological: Negative.    Endo/Heme/Allergies: Negative.  Negative for polydipsia. Does not bruise/bleed easily.   Psychiatric/Behavioral: Negative for depression and substance abuse.     Physical Exam   Constitutional: She is oriented to person, place, and time. She appears well-developed and well-nourished. No distress.   HENT:   Head: Normocephalic and atraumatic.   Right Ear: External ear normal.   Left Ear: External ear normal.   Nose: Nose normal.   Mouth/Throat: Oropharynx is clear and moist. No oropharyngeal exudate.   Eyes: Pupils are equal, round, and reactive to light. Conjunctivae and EOM are normal. Right eye exhibits no discharge. Left eye exhibits no discharge.   Neck: Normal range of motion. Neck supple. No thyromegaly present.   Cardiovascular: Normal rate, regular rhythm, normal heart sounds and intact distal pulses.   No murmur heard.  Pulmonary/Chest: Effort normal and breath sounds normal. No respiratory distress. She has no wheezes.   Abdominal: Soft. Bowel sounds are normal. She exhibits no distension and no mass. There is no tenderness.   Musculoskeletal: Normal range of motion. She exhibits no edema.   Lymphadenopathy:     She has no cervical adenopathy.   Neurological: She is alert and oriented to person, place, and time. No cranial nerve deficit.   Skin: Capillary refill takes less than 2 seconds. Rash noted. She is not diaphoretic. There is erythema.        Skin rash in the pannus fold.     Psychiatric: She has a normal mood and affect. Her behavior is normal. Judgment and thought content normal.   Nursing note and vitals reviewed.      Laboratory Reviewed ({No)  Lab Results   Component Value Date    WBC 5.56 04/18/2018    HGB 13.6 04/18/2018    HCT 43.3 04/18/2018     04/18/2018    CHOL 275 (H) 08/30/2019    TRIG 194 (H) 08/30/2019    HDL 68 08/30/2019    ALT 12  08/30/2018    AST 14 08/30/2018     08/30/2019    K 4.9 08/30/2019     08/30/2019    CREATININE 0.8 08/30/2019    BUN 19 08/30/2019    CO2 29 08/30/2019    TSH 1.292 07/10/2013    INR 1.0 02/02/2015       Isamar was seen today for annual exam.    Diagnoses and all orders for this visit:    Annual physical exam    Arteriosclerosis of aorta    Heart valve regurgitation    Hiatal hernia with gastroesophageal reflux    Essential hypertension    Hyperlipidemia, unspecified hyperlipidemia type    Lumbar arthropathy    History of skin cancer    Osteoporosis, unspecified osteoporosis type, unspecified pathological fracture presence  -     Cancel: DXA Bone Density Spine And Hip; Future    Statin intolerance    At risk for depressed mood  -     traZODone (DESYREL) 50 MG tablet; Take 1 tablet (50 mg total) by mouth every evening.    Poor sleep  -     traZODone (DESYREL) 50 MG tablet; Take 1 tablet (50 mg total) by mouth every evening.    Rash and nonspecific skin eruption  -     nystatin (MYCOSTATIN) powder; Apply topically 4 (four) times daily.  -     clotrimazole (LOTRIMIN) 1 % cream; Apply topically 2 (two) times daily.      Arteriosclerosis of aorta  DId not tolerate statin.  Found on CT in 2012.    Risk reductions, diet control.  Lab Results   Component Value Date    LDLCALC 168.2 (H) 08/30/2019     Can consider Zetia, but will need to review insurance coverage/cost  On ASA      Heart valve regurgitation  Last Echo 2015  No symptoms  Will not order again unless symptoms. Mild        Hiatal hernia with gastroesophageal reflux  Can use OTC PPI  Asymtpomatic      HTN (hypertension)  BP in goal range  ON Calan 120  Did not tolerate ACE, Norvasc, Chlorothiadone    Hyperlipidemia  Lab Results   Component Value Date    LDLCALC 168.2 (H) 08/30/2019     Intolerant to statin  Opted for diet control  Can offer Zetia      Lumbar arthropathy  Consult with neurosx  I have reviewed the patient's MRI she has says bilateral  cyst at L5-S1 which is of unknown significance.  She has a spondylolisthesis at L3-4 and 4-5 which appears stable since 2014.  Unsure of the source of her new symptoms.  We will order an x-ray and CT scan of the lumbar spine for review.  She can follow up after the imaging is complete.    History of skin cancer  [facility]    Osteoporosis  Recheck Dexa    Statin intolerance  Intolerance            Care Plan/Goals: Reviewed  (N/A)  Goals     None          Follow up: Follow up in about 6 months (around 3/9/2020).    After visit summary was printed and given to patient upon discharge today.  Patient goals and care plan are included in After Visit Summary.

## 2024-05-29 ENCOUNTER — OFFICE VISIT (OUTPATIENT)
Dept: GASTROENTEROLOGY | Facility: CLINIC | Age: 89
End: 2024-05-29
Payer: MEDICARE

## 2024-05-29 VITALS — WEIGHT: 126.75 LBS | BODY MASS INDEX: 25.55 KG/M2 | HEIGHT: 59 IN

## 2024-05-29 DIAGNOSIS — R19.5 ABNORMAL FINDINGS IN STOOL: Primary | ICD-10-CM

## 2024-05-29 DIAGNOSIS — B83.9 WORMS IN STOOL: ICD-10-CM

## 2024-05-29 DIAGNOSIS — Z90.49 S/P APPENDECTOMY: ICD-10-CM

## 2024-05-29 DIAGNOSIS — Z90.49 S/P CHOLECYSTECTOMY: ICD-10-CM

## 2024-05-29 PROCEDURE — 1159F MED LIST DOCD IN RCRD: CPT | Mod: CPTII,S$GLB,,

## 2024-05-29 PROCEDURE — 99999 PR PBB SHADOW E&M-EST. PATIENT-LVL III: CPT | Mod: PBBFAC,,,

## 2024-05-29 PROCEDURE — 1101F PT FALLS ASSESS-DOCD LE1/YR: CPT | Mod: CPTII,S$GLB,,

## 2024-05-29 PROCEDURE — 3288F FALL RISK ASSESSMENT DOCD: CPT | Mod: CPTII,S$GLB,,

## 2024-05-29 PROCEDURE — 1160F RVW MEDS BY RX/DR IN RCRD: CPT | Mod: CPTII,S$GLB,,

## 2024-05-29 PROCEDURE — 99214 OFFICE O/P EST MOD 30 MIN: CPT | Mod: S$GLB,,,

## 2024-05-29 RX ORDER — OMEPRAZOLE 40 MG/1
40 CAPSULE, DELAYED RELEASE ORAL EVERY MORNING
Qty: 30 CAPSULE | Refills: 2 | Status: CANCELLED | OUTPATIENT
Start: 2024-05-29 | End: 2024-08-27

## 2024-05-29 NOTE — PROGRESS NOTES
Subjective:       Patient ID: Isamar Roe is a 89 y.o. female Body mass index is 25.6 kg/m².    Chief Complaint: GI Problem (Abnormal finding in stool)    This patient is new to me.  Established patient of Dr. Hung.     GI Problem  Primary symptoms do not include fever, weight loss, fatigue, abdominal pain, nausea, vomiting, diarrhea (Patient currently having 1 BM daily rated stool 3 on Hamilton scale), melena, hematemesis, jaundice, hematochezia or dysuria.   The illness does not include chills, dysphagia, odynophagia, bloating, constipation or itching (denies rectal itching). Associated symptoms comments: CHIEF COMPLAINT:  Patient reports noticing white stringy thread that resembled a worm in her stool after BM yesterday when wiping.  Patient denies contact with animals, denies ill contacts, performs good hand hygiene.  She does report eating green leafy salads frequently.  Colonoscopy 05/22/12 - Non-thrombosed internal hemorrhoids. Internal hemorrhoids. Diverticulosis in the sigmoid colon and in the descending colon. EGD 05/22/12 - Normal esophagus. Z-line regular, 38 cm from the incisors. Hiatus hernia. Duodenal diverticulum. Significant associated medical issues include hemorrhoids and diverticulitis (hx of diverticulitis in 2012). Associated medical issues do not include inflammatory bowel disease, GERD (Resolved; History of hiatal hernia), liver disease, alcohol abuse, PUD, gastric bypass, bowel resection or irritable bowel syndrome. Associated medical issues comments: S/p appendectomy and cholecystectomy.     Review of Systems   Constitutional:  Negative for activity change, appetite change, chills, diaphoresis, fatigue, fever, unexpected weight change and weight loss.   HENT:  Negative for sore throat and trouble swallowing.    Respiratory:  Negative for cough, choking and shortness of breath.    Cardiovascular:  Negative for chest pain.   Gastrointestinal:  Negative for abdominal distention, abdominal  pain, anal bleeding, bloating, blood in stool, constipation, diarrhea (Patient currently having 1 BM daily rated stool 3 on Hitchcock scale), dysphagia, hematemesis, hematochezia, jaundice, melena, nausea, rectal pain and vomiting.   Genitourinary:  Negative for dysuria.   Skin:  Negative for itching (denies rectal itching).       No LMP recorded. Patient has had a hysterectomy.  Past Medical History:   Diagnosis Date    Arthritis     hands    Benign hematuria     [place]    Calcific tendinitis of left shoulder 1/27/2016    Cataract     Diverticulosis     colonoscopy 5/22/2012    Duodenal diverticulum     EGD 5/22/2012    GERD (gastroesophageal reflux disease)     Hemorrhoids     colonoscopy 5/22/2012    History of skin cancer     per patient s/p excision, no chemo or radiation, sees outside derm, Dr. Tinsley.    Hyperlipidemia     Hypertension     Impingement syndrome of left shoulder 1/27/2016    Osteoporosis     declines tx;11/17/16 phone note    Pneumonia     as a child    Sciatica     [place]; dr castillo    Skin cancer     Dr. Noriega    Vitamin D deficiency disease      Past Surgical History:   Procedure Laterality Date    APPENDECTOMY      CARPAL TUNNEL RELEASE Bilateral     CATARACT EXTRACTION W/  INTRAOCULAR LENS IMPLANT Left 05/01/2019    CATARACT EXTRACTION W/  INTRAOCULAR LENS IMPLANT Right 05/15/2019    CHOLECYSTECTOMY      COLONOSCOPY      HYSTERECTOMY      joint removal in right thumb      MINIMALLY INVASIVE TRANSFORAMINAL LUMBAR INTERBODY FUSION (TLIF) N/A 06/29/2021    Procedure: FUSION, SPINE, LUMBAR, TLIF, MINIMALLY INVASIVE;  Surgeon: Iraida Lozada MD;  Location: [facility] OR [address] [place] [address];  Service: Neurosurgery;  Laterality: N/A;  L4-5    SKIN CANCER EXCISION      SPINE SURGERY  09/16/2014    L4/5 laminectomy;dr lozada    TOE SURGERY      LT FIFTH     Family History   Problem Relation Name Age of Onset    Heart disease Mother      Hypertension Mother      Cataracts Mother      Heart disease Father       Hypertension Father      Cataracts Father      Heart disease Sister          A-fib    Heart disease Sister          heart bypass x 5    Heart disease Brother  45    Kidney disease Brother  70    Heart disease Brother      Cancer Maternal Aunt          breast    Diabetes Maternal Aunt      Diabetes Maternal Grandmother      Hypertension Other          multiple family members    Cancer Other          multiple with skin cancer    Stroke Neg Hx      Melanoma Neg Hx      Hyperlipidemia Neg Hx      Colon cancer Neg Hx      Esophageal cancer Neg Hx      Stomach cancer Neg Hx       Social History     Tobacco Use    Smoking status: Never    Smokeless tobacco: Never   Substance Use Topics    Alcohol use: No    Drug use: No     Wt Readings from Last 10 Encounters:   05/29/24 57.5 kg (126 lb 12.2 oz)   02/20/24 57.1 kg (125 lb 12.4 oz)   10/24/23 59 kg (130 lb 1.1 oz)   10/20/23 58.6 kg (129 lb 1.3 oz)   09/06/23 59.5 kg (131 lb 2.8 oz)   05/30/23 58.5 kg (129 lb)   05/02/23 58.8 kg (129 lb 10.1 oz)   03/15/23 58.5 kg (129 lb)   11/02/22 59.3 kg (130 lb 11.7 oz)   09/16/22 59 kg (130 lb 1.6 oz)     Lab Results   Component Value Date    WBC 6.79 02/29/2024    HGB 14.3 02/29/2024    HCT 45.6 02/29/2024    MCV 93 02/29/2024     02/29/2024     CMP  Sodium   Date Value Ref Range Status   02/29/2024 141 136 - 145 mmol/L Final     Potassium   Date Value Ref Range Status   02/29/2024 3.8 3.5 - 5.1 mmol/L Final     Chloride   Date Value Ref Range Status   02/29/2024 104 95 - 110 mmol/L Final     CO2   Date Value Ref Range Status   02/29/2024 26 23 - 29 mmol/L Final     Glucose   Date Value Ref Range Status   02/29/2024 84 70 - 110 mg/dL Final     BUN   Date Value Ref Range Status   02/29/2024 19 8 - 23 mg/dL Final     Creatinine   Date Value Ref Range Status   02/29/2024 0.7 0.5 - 1.4 mg/dL Final     Calcium   Date Value Ref Range Status   02/29/2024 10.1 8.7 - 10.5 mg/dL Final     Total Protein   Date Value Ref Range Status    02/29/2024 7.4 6.0 - 8.4 g/dL Final     Albumin   Date Value Ref Range Status   02/29/2024 3.6 3.5 - 5.2 g/dL Final     Total Bilirubin   Date Value Ref Range Status   02/29/2024 0.5 0.1 - 1.0 mg/dL Final     Comment:     For infants and newborns, interpretation of results should be based  on gestational age, weight and in agreement with clinical  observations.    Premature Infant recommended reference ranges:  Up to 24 hours.............<8.0 mg/dL  Up to 48 hours............<12.0 mg/dL  3-5 days..................<15.0 mg/dL  6-29 days.................<15.0 mg/dL       Alkaline Phosphatase   Date Value Ref Range Status   02/29/2024 81 55 - 135 U/L Final     AST   Date Value Ref Range Status   02/29/2024 17 10 - 40 U/L Final     ALT   Date Value Ref Range Status   02/29/2024 12 10 - 44 U/L Final     Anion Gap   Date Value Ref Range Status   02/29/2024 11 8 - 16 mmol/L Final     eGFR if    Date Value Ref Range Status   04/21/2022 >60 >60 mL/min/1.73 m^2 Final     eGFR if non    Date Value Ref Range Status   04/21/2022 >60 >60 mL/min/1.73 m^2 Final     Comment:     Calculation used to obtain the estimated glomerular filtration  rate (eGFR) is the CKD-EPI equation.        Lab Results   Component Value Date    AMYLASE 107 06/24/2013     Lab Results   Component Value Date    LIPASE 55 09/10/2014     Lab Results   Component Value Date    TSH 1.879 02/29/2024     Reviewed prior medical records including radiology report of [facility] 06/01/2015, CT abdomen 04/02/2012, CT abdomen and pelvis 04/02/2012, abdominal ultrasound 11/30/2011 & endoscopy history (see surgical history).    Objective:      Physical Exam  Vitals and nursing note reviewed.   Constitutional:       General: She is not in acute distress.     Appearance: Normal appearance. She is not ill-appearing.   HENT:      Mouth/Throat:      Lips: Pink. No lesions.   Cardiovascular:      Rate and Rhythm: Normal rate.      Heart sounds: Normal  heart sounds.   Pulmonary:      Effort: Pulmonary effort is normal. No respiratory distress.      Breath sounds: Normal breath sounds.   Abdominal:      General: Bowel sounds are normal. There is no distension or abdominal bruit. There are no signs of injury.      Palpations: Abdomen is soft. There is no shifting dullness, fluid wave, hepatomegaly, splenomegaly or mass.      Tenderness: There is no abdominal tenderness. There is no guarding or rebound. Negative signs include Guerra's sign, Rovsing's sign and McBurney's sign.   Skin:     General: Skin is warm and dry.      Coloration: Skin is not jaundiced or pale.   Neurological:      Mental Status: She is alert and oriented to person, place, and time.   Psychiatric:         Attention and Perception: Attention normal.         Mood and Affect: Mood normal.         Speech: Speech normal.         Behavior: Behavior normal.         Assessment:       1. Abnormal findings in stool    2. Worms in stool    3. S/P cholecystectomy    4. S/P appendectomy        Plan:       Abnormal findings in stool & Worms in stool  -     Stool Exam-Ova,Cysts,Parasites; Future; Expected date: 05/29/2024  - performed good hand hygiene    S/P cholecystectomy & S/P appendectomy      Follow up in about 4 weeks (around 6/26/2024), or if symptoms worsen or fail to improve.      If no improvement in symptoms or symptoms worsen, call/follow-up at clinic or go to ER.        Total time spent on the encounter includes face to face time and non-face to face time preparing to see the patient (eg, review of tests), Obtaining and/or reviewing separately obtained history, Documenting clinical information in the electronic or other health record, Independently interpreting results (not separately reported) and communicating results to the patient/family/caregiver, or Care coordination (not separately reported).     A dictation software program was used for this note. Please expect some simple typographical   errors in this note.

## 2024-05-30 ENCOUNTER — LAB VISIT (OUTPATIENT)
Dept: LAB | Facility: HOSPITAL | Age: 89
End: 2024-05-30
Payer: MEDICARE

## 2024-05-30 DIAGNOSIS — B83.9 WORMS IN STOOL: ICD-10-CM

## 2024-05-30 DIAGNOSIS — R19.5 ABNORMAL FINDINGS IN STOOL: ICD-10-CM

## 2024-05-30 PROCEDURE — 87209 SMEAR COMPLEX STAIN: CPT

## 2024-06-03 LAB — O+P STL MICRO: NORMAL

## 2024-06-27 DIAGNOSIS — I10 ESSENTIAL HYPERTENSION: ICD-10-CM

## 2024-06-27 RX ORDER — VERAPAMIL HYDROCHLORIDE 120 MG/1
120 TABLET, FILM COATED ORAL 2 TIMES DAILY
Qty: 180 TABLET | Refills: 1 | Status: SHIPPED | OUTPATIENT
Start: 2024-06-27

## 2024-06-27 NOTE — TELEPHONE ENCOUNTER
No care due was identified.  [facility] Embedded Care Due Messages. Reference number: [id number].   6/27/2024 1:24:54 PM CDT

## 2024-06-27 NOTE — TELEPHONE ENCOUNTER
Refill Decision Note   Isamar Roe  is requesting a refill authorization.  Brief Assessment and Rationale for Refill:  Approve     Medication Therapy Plan:         Comments:     Note composed:6:08 PM 06/27/2024

## 2024-08-05 ENCOUNTER — PATIENT MESSAGE (OUTPATIENT)
Dept: PRIMARY CARE CLINIC | Facility: CLINIC | Age: 89
End: 2024-08-05
Payer: MEDICARE

## 2024-08-05 RX ORDER — LACTULOSE 10 G/15ML
30 SOLUTION ORAL 3 TIMES DAILY PRN
Qty: 300 ML | Refills: 0 | Status: SHIPPED | OUTPATIENT
Start: 2024-08-05

## 2024-11-05 ENCOUNTER — OFFICE VISIT (OUTPATIENT)
Dept: PRIMARY CARE CLINIC | Facility: CLINIC | Age: 89
End: 2024-11-05
Payer: MEDICARE

## 2024-11-05 VITALS
TEMPERATURE: 99 F | SYSTOLIC BLOOD PRESSURE: 136 MMHG | HEART RATE: 64 BPM | WEIGHT: 127 LBS | BODY MASS INDEX: 25.6 KG/M2 | OXYGEN SATURATION: 97 % | DIASTOLIC BLOOD PRESSURE: 68 MMHG | HEIGHT: 59 IN

## 2024-11-05 DIAGNOSIS — Z23 INFLUENZA VACCINE NEEDED: Primary | ICD-10-CM

## 2024-11-05 DIAGNOSIS — R41.3 MEMORY DEFICIT: ICD-10-CM

## 2024-11-05 DIAGNOSIS — N30.00 ACUTE CYSTITIS WITHOUT HEMATURIA: ICD-10-CM

## 2024-11-05 LAB
BACTERIA #/AREA URNS AUTO: NORMAL /HPF
BILIRUB UR QL STRIP: NEGATIVE
CLARITY UR REFRACT.AUTO: CLEAR
COLOR UR AUTO: COLORLESS
GLUCOSE UR QL STRIP: NEGATIVE
HGB UR QL STRIP: ABNORMAL
KETONES UR QL STRIP: NEGATIVE
LEUKOCYTE ESTERASE UR QL STRIP: ABNORMAL
MICROSCOPIC COMMENT: NORMAL
NITRITE UR QL STRIP: NEGATIVE
PH UR STRIP: 6 [PH] (ref 5–8)
PROT UR QL STRIP: NEGATIVE
RBC #/AREA URNS AUTO: 4 /HPF (ref 0–4)
SP GR UR STRIP: 1.01 (ref 1–1.03)
URN SPEC COLLECT METH UR: ABNORMAL
WBC #/AREA URNS AUTO: 3 /HPF (ref 0–5)

## 2024-11-05 PROCEDURE — 81001 URINALYSIS AUTO W/SCOPE: CPT | Performed by: INTERNAL MEDICINE

## 2024-11-05 PROCEDURE — 1101F PT FALLS ASSESS-DOCD LE1/YR: CPT | Mod: CPTII,S$GLB,, | Performed by: INTERNAL MEDICINE

## 2024-11-05 PROCEDURE — 99214 OFFICE O/P EST MOD 30 MIN: CPT | Mod: S$GLB,,, | Performed by: INTERNAL MEDICINE

## 2024-11-05 PROCEDURE — 90653 IIV ADJUVANT VACCINE IM: CPT | Mod: S$GLB,,, | Performed by: INTERNAL MEDICINE

## 2024-11-05 PROCEDURE — G0008 ADMIN INFLUENZA VIRUS VAC: HCPCS | Mod: S$GLB,,, | Performed by: INTERNAL MEDICINE

## 2024-11-05 PROCEDURE — 3288F FALL RISK ASSESSMENT DOCD: CPT | Mod: CPTII,S$GLB,, | Performed by: INTERNAL MEDICINE

## 2024-11-05 PROCEDURE — 99999 PR PBB SHADOW E&M-EST. PATIENT-LVL IV: CPT | Mod: PBBFAC,,, | Performed by: INTERNAL MEDICINE

## 2024-11-05 PROCEDURE — 1159F MED LIST DOCD IN RCRD: CPT | Mod: CPTII,S$GLB,, | Performed by: INTERNAL MEDICINE

## 2024-11-05 RX ORDER — AMOXICILLIN AND CLAVULANATE POTASSIUM 875; 125 MG/1; MG/1
1 TABLET, FILM COATED ORAL 2 TIMES DAILY
COMMUNITY

## 2024-11-05 RX ORDER — MULTIVITAMIN WITH IRON
TABLET ORAL DAILY
COMMUNITY

## 2024-11-07 NOTE — PROGRESS NOTES
Results have been released via [employer]. Please verify that these have been viewed by patient. If not, please call patient with results.      I have sent a message to them with the following interpretation (see below).    I have reviewed your recent results.    Urine appearance is improving.     Please do not hesitate to call or message with any additional questions or concerns.    Alina Roe MD

## 2025-01-08 NOTE — PROGRESS NOTES
Assessment & Plan  1. Urinary Tract Infection.  She recently visited the ER and was diagnosed with a urinary tract infection. She is currently on antibiotics and has a few doses left. A urine sample will be collected today to ensure the infection has cleared. If the urine test indicates a persistent infection, the antibiotic treatment will be adjusted as necessary. She is advised to continue her current antibiotic regimen.    2. Dizziness.  She reports dizziness when moving her head or turning over in bed, which she suspects might be vertigo. This has occurred before. Further evaluation and monitoring of symptoms are recommended.    3. Memory Issues.  She has exhibited some memory issues, such as difficulty recalling recent events and confusion about the current year and location. A referral to a neurologist will be made for further evaluation and potential treatment options to manage her memory issues and prevent further decline.    4. Health Maintenance.  An influenza vaccine will be administered today.      Problem List Items Addressed This Visit    None  Visit Diagnoses       Influenza vaccine needed    -  Primary    Acute cystitis without hematuria        Relevant Orders    Urinalysis, Reflex to Urine Culture Urine, Clean Catch (Completed)    Memory deficit        Relevant Orders    Ambulatory referral/consult to Neurology            No follow-ups on file.    Alina Roe MD  _____________________________________________________________________________________________________________________________________________________    CC: UTI/memory issues    Patient is in clinic today as an established patient.    History of Present Illness  The patient is an 89-year-old female who presents for evaluation of multiple medical concerns. She is accompanied by her daughters-in-law.    She reports feeling unwell in recent days and visited the ER, where she was diagnosed with a urinary tract infection (UTI). She is  currently on antibiotics for this condition and has requested a repeat urine test to confirm the resolution of her UTI. She does report that symptoms are beginning to improve.    She experiences dizziness when moving her head or turning over in bed, which she suspects might be vertigo, a condition she has experienced before.  Denies any other neurologic symptoms except that her family is concerned about her memory and are concerned about the beginnings of dementia. She admits to occasionally forgetting things at home.     No other new complaints today.     Current Outpatient Medications on File Prior to Visit   Medication Sig Dispense Refill    amoxicillin-clavulanate 875-125mg (AUGMENTIN) 875-125 mg per tablet Take 1 tablet by mouth 2 (two) times daily.      hydroCHLOROthiazide (MICROZIDE) 12.5 mg capsule TAKE 1 CAPSULE BY MOUTH EVERY DAY 90 capsule 3    omega-3 fatty acids/fish oil (FISH OIL-OMEGA-3 FATTY ACIDS) 300-1,000 mg capsule Take by mouth once daily.      traZODone (DESYREL) 50 MG tablet Take 1 tablet (50 mg total) by mouth every evening. 90 tablet 3    verapamiL (CALAN) 120 MG tablet Take 1 tablet (120 mg total) by mouth 2 (two) times daily. 180 tablet 1     No current facility-administered medications on file prior to visit.       Review of Systems   Constitutional:  Negative for chills, diaphoresis, fatigue and fever.   HENT:  Negative for congestion, ear pain, postnasal drip, sinus pain and sore throat.    Eyes:  Negative for pain and redness.   Respiratory:  Negative for cough, chest tightness and shortness of breath.    Cardiovascular:  Negative for chest pain and leg swelling.   Gastrointestinal:  Negative for abdominal pain, constipation, diarrhea, nausea and vomiting.   Genitourinary:  Negative for dysuria, flank pain, frequency and hematuria.   Musculoskeletal:  Negative for arthralgias and joint swelling.   Skin:  Negative for rash.   Neurological:  Negative for dizziness, syncope,  "weakness, numbness and headaches.   Psychiatric/Behavioral:  Negative for dysphoric mood. The patient is not nervous/anxious.      Vitals:    11/05/24 1441   BP: 136/68   BP Location: Left arm   Patient Position: Sitting   Pulse: 64   Temp: 98.5 °F (36.9 °C)   SpO2: 97%   Weight: 57.6 kg (126 lb 15.8 oz)   Height: 4' 11" (1.499 m)       Wt Readings from Last 3 Encounters:   11/05/24 57.6 kg (126 lb 15.8 oz)   05/29/24 57.5 kg (126 lb 12.2 oz)   02/20/24 57.1 kg (125 lb 12.4 oz)       Physical Exam  Constitutional:       General: She is not in acute distress.     Appearance: Normal appearance. She is well-developed.   HENT:      Head: Normocephalic and atraumatic.   Eyes:      Conjunctiva/sclera: Conjunctivae normal.   Cardiovascular:      Rate and Rhythm: Normal rate and regular rhythm.      Pulses: Normal pulses.      Heart sounds: Normal heart sounds. No murmur heard.  Pulmonary:      Effort: Pulmonary effort is normal. No respiratory distress.      Breath sounds: Normal breath sounds.   Abdominal:      General: Bowel sounds are normal. There is no distension.      Palpations: Abdomen is soft.      Tenderness: There is no abdominal tenderness.   Musculoskeletal:         General: Normal range of motion.      Cervical back: Normal range of motion and neck supple.   Skin:     General: Skin is warm and dry.      Findings: No rash.   Neurological:      General: No focal deficit present.      Mental Status: She is alert and oriented to person, place, and time.      Cranial Nerves: No cranial nerve deficit.      Sensory: No sensory deficit.      Motor: No weakness.      Coordination: Coordination normal.      Gait: Gait normal.   Psychiatric:         Mood and Affect: Mood normal.         Behavior: Behavior normal.     DISCLAIMER: This note was compiled by using a speech recognition dictation system and therefore please be aware that typographical / speech recognition errors can and do occur.  Please contact me if you see " any errors specifically.  Consent was obtained for [employer] recording system prior to the visit.

## 2025-03-01 NOTE — TELEPHONE ENCOUNTER
Care Due:                  Date            Visit Type   Department     Provider  --------------------------------------------------------------------------------                                MYCHART                              FOLLOWUP/OF  [facility]  Last Visit: 11-      [facility] VISIT   Ted Roe  Next Visit: None Scheduled  None         None Found                                                            Last  Test          Frequency    Reason                     Performed    Due Date  --------------------------------------------------------------------------------    CMP.........  12 months..  hydroCHLOROthiazide......  02- 02-    [facility] Embedded Care Due Messages. Reference number: [id number].   3/01/2025 6:31:45 AM CST

## 2025-03-03 RX ORDER — HYDROCHLOROTHIAZIDE 12.5 MG/1
12.5 CAPSULE ORAL DAILY
Qty: 90 CAPSULE | Refills: 1 | Status: SHIPPED | OUTPATIENT
Start: 2025-03-03

## 2025-03-03 NOTE — TELEPHONE ENCOUNTER
Refill Routing Note   Medication(s) are not appropriate for processing by [facility] for the following reason(s):        Required labs outdated    [facility] action(s):  Defer   Requires labs : Yes             Appointments  past 12m or future 3m with PCP    Date Provider   Last Visit   11/5/2024 Alina Roe MD   Next Visit   Visit date not found Alina Roe MD   ED visits in past 90 days: 0        Note composed:8:09 AM 03/03/2025

## 2025-03-31 DIAGNOSIS — F51.01 PRIMARY INSOMNIA: ICD-10-CM

## 2025-03-31 DIAGNOSIS — I10 ESSENTIAL HYPERTENSION: ICD-10-CM

## 2025-03-31 RX ORDER — HYDROCHLOROTHIAZIDE 12.5 MG/1
12.5 CAPSULE ORAL DAILY
Qty: 90 CAPSULE | Refills: 1 | Status: CANCELLED | OUTPATIENT
Start: 2025-03-31

## 2025-03-31 RX ORDER — VERAPAMIL HYDROCHLORIDE 120 MG/1
120 TABLET, FILM COATED ORAL 2 TIMES DAILY
Qty: 180 TABLET | Refills: 3 | Status: SHIPPED | OUTPATIENT
Start: 2025-03-31

## 2025-03-31 RX ORDER — TRAZODONE HYDROCHLORIDE 50 MG/1
50 TABLET ORAL NIGHTLY
Qty: 90 TABLET | Refills: 3 | Status: SHIPPED | OUTPATIENT
Start: 2025-03-31

## 2025-03-31 NOTE — TELEPHONE ENCOUNTER
No care due was identified.  [facility] Embedded Care Due Messages. Reference number: [id number].   3/31/2025 12:56:24 PM CDT

## (undated) DEVICE — KIT SPINAL PATIENT CARE JACK

## (undated) DEVICE — DRAPE ABDOMINAL TIBURON 14X11

## (undated) DEVICE — DRAPE OPMI STERILE

## (undated) DEVICE — SUT 0 VICRYL / UR6 (J603)

## (undated) DEVICE — GWIRE SPINAL BLNT TIP 1.6X19
Type: IMPLANTABLE DEVICE | Site: SPINE LUMBAR | Status: NON-FUNCTIONAL
Removed: 2021-06-29

## (undated) DEVICE — TUBE FRAZIER 5MM 2FT SOFT TIP

## (undated) DEVICE — DRESSING TRANS 4X4 TEGADERM

## (undated) DEVICE — SEE MEDLINE ITEM 156905

## (undated) DEVICE — DRAPE C-ARMOR EQUIPMENT COVER

## (undated) DEVICE — MARKER SKIN STND TIP BLUE BARR

## (undated) DEVICE — DRAPE STERI-DRAPE 1000 17X11IN

## (undated) DEVICE — DRESSING AQUACEL FOAM 5 X 5

## (undated) DEVICE — DRESSING SURGICAL 1/2X1/2

## (undated) DEVICE — BUR BONE CUT MICRO TPS 3X3.8MM

## (undated) DEVICE — DRAPE C ARM 42 X 120 10/BX

## (undated) DEVICE — TRAY FOLEY 16FR INFECTION CONT

## (undated) DEVICE — DRESSING TELFA STRL 4X3 LF

## (undated) DEVICE — DIFFUSER

## (undated) DEVICE — DURAPREP SURG SCRUB 26ML

## (undated) DEVICE — DRESSING AQUACEL SACRAL 9 X 9

## (undated) DEVICE — DRESSING AQUACEL FOAM 3 X 3

## (undated) DEVICE — BLADE ELECTRO EDGE INSULATED

## (undated) DEVICE — DRAPE STERI INSTRUMENT 1018

## (undated) DEVICE — DIRECT ACCESS DIAMOND TIP TROCAR

## (undated) DEVICE — SEE MEDLINE ITEM 157131

## (undated) DEVICE — KIT ACCESS DILATOR SET PROBE

## (undated) DEVICE — SUT MCRYL PLUS 4-0 PS2 27IN

## (undated) DEVICE — ELECTRODE REM PLYHSV RETURN 9

## (undated) DEVICE — SUT VICRYL PLUS 3-0 SH 18IN

## (undated) DEVICE — CARTRIDGE OIL

## (undated) DEVICE — CORD BIPOLAR 12 FOOT

## (undated) DEVICE — KIT SURGIFLO HEMOSTATIC MATRIX

## (undated) DEVICE — GAUZE SPONGE 4X4 12PLY

## (undated) DEVICE — DRAPE INCISE IOBAN 2 23X17IN